# Patient Record
Sex: FEMALE | Race: WHITE | Employment: OTHER | URBAN - NONMETROPOLITAN AREA
[De-identification: names, ages, dates, MRNs, and addresses within clinical notes are randomized per-mention and may not be internally consistent; named-entity substitution may affect disease eponyms.]

---

## 2022-11-06 NOTE — PROGRESS NOTES
[facility]. Pulmonary medicine clinic initial consult note. Patient: Flip Hernández  : 1932      Chief complaint/[place]: Flip Hernández is a 80 y.o. old female came for further evaluation regarding her chronic cough with referral from Dr. Harman Solano MD.  She was brought to my clinic accompanied by her son Mr. Zuñiga Covert. She is having cough: Yes  Duration of cough: for More than 6months  More than 8weeks: Yes  History of post nasal drip: No  History suggestive of GERD I.e bad tast in the mouth in the morning or heart burn: Yes. She is currently on treatment with the Protonix 40 mg p.o. daily for her GERD. History suggestive of aspiration/silent aspiration: No  Her cough is associated with sputum production: Yes   The sputum color: Whit to light yellow. Hemoptysis: She gave a history of occasional minimal hemoptysis mixed with her sputum. Diurnal variation: None. Associated with fever: No  Chills & rigors:No  Associated night sweats: No.  Recent travel history:No.    Rrecent sick contacts: No.   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  She is to follow-up with Dr. Sky Brown MD ENT physician in [place]. She used to be on treatment with Dupixent injections. She received her last dose of Dupixent in 2022. History of exposure to occupational dust or chemicals:No.       She is having shortness of breath:No  Current functional capacity on level ground: Distance she can walk on level ground: 2 blocks on level ground with slow pace. She can climb steps: No  She gives a history of orthopnea:No  She gives a history of paroxysmal nocturnal dyspnea:No     She is currently using any oxygen supplementation at rest, exercise or during sleep/at night time: No    She was ever diagnosed with following pulmonary diseases:  She was diagnosed with bronchial asthma in the past by her family physician.   She is currently on treatment with Qvar ready inhaler 80 mcg/ACT 1 puff twice daily. She is also taking Singulair 10 mg p.o. nightly. She is not using any rescue inhaler. 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 Her chest x-ray used to be negative. She never underwent treatment for latent TB infection. 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 She ever diagnosed with COVID-19 infection in the past: She was diagnosed with the COVID-19 infection on 29 October 2022. She was hospitalized to [facility] THE [facility] and stayed for 2 nights. She was treated with steroids Decadron 6 mg IV daily and was discharged home with 6 mg p.o. daily for 4 days. She completed her steroids 2 days back on 8 November 2022. She was also treated with ceftriaxone 1 g IV piggyback daily antibiotic during her hospitalization for 3 days and was discharged home on doxycycline 100 mg p.o. twice daily for 4 days. She was also treated with baricitinib 4 mg daily. She was also given treatment with vitamin C 500 mg p.o. twice daily along with vitamin D 5000 units daily and a zinc sulfate 220 mg p.o. twice daily. She was treated with several courses of antibiotics by her family physician with minimal improvement in her cough symptoms. How ever her cough got worse recently. Social History:  Occupation:  She is current working: No  Type of profession: retired.   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       History of exposure to coal mines/coal dust: NO  History of exposure to foundry dust/welding: NO  History of exposure to quarry/silica/sandblasting: NO  History of exposure to asbestos/working with breaks/ships: NO  History of exposure to farm dust: NO  History of recent travel to long distances: NO  History of exposure to birds, pigeons, or chickens in the past:NO  Pet animals at home:No    History of pulmonary embolism in the past: No            History of DVT in the past:No                               Review of Systems:   General/Constitutional: No recent loss of weight or appetite changes. No fever or chills. HENT: Negative. Eyes: Negative. Upper respiratory tract: No nasal stuffiness or post nasal drip. Lower respiratory tract/ lungs: See HPI. Cardiovascular: No palpitations or chest pain. Gastrointestinal: No nausea or vomiting. Neurological: No focal neurologiacal weakness. Extremities: No edema. Musculoskeletal: No complaints. Genitourinary: No complaints. Hematological: Negative. Psychiatric/Behavioral: Negative. Skin: No itching. Current Medications:      Past Medical History:   Diagnosis Date    Asthma     Cognitive communication deficit     Left bundle branch block     Pneumonia        No past surgical history on file.     Allergies   Allergen Reactions    Accolate [Zafirlukast] Other (See Comments)     unknown    Asa [Aspirin] Other (See Comments)     unknown    Chlordiazepoxide Other (See Comments)     unknown    Ciprofloxacin Other (See Comments)     unknown    Clindamycin/Lincomycin Other (See Comments)     Unknown    Codeine      All codeine    Demerol [Meperidine Hcl] Other (See Comments)     unknown    Diazepam Other (See Comments)     unknown    Diclofenac Sodium Other (See Comments)     unknown Evista [Raloxifene] Other (See Comments)     unknown    Gabapentin Other (See Comments)     unknown    Iodine Other (See Comments)     unknown    Metoprolol Other (See Comments)     unknown    Pcn [Penicillins] Other (See Comments)     unknown    Phenergan [Promethazine] Other (See Comments)     unknown    Promethazine Hcl Other (See Comments)     unknown    Talwin [Pentazocine] Other (See Comments)     unknown    Triamcinolone Acetonide Other (See Comments)     unknown       Current Outpatient Medications   Medication Sig Dispense Refill    sotalol (BETAPACE) 80 MG tablet Take 80 mg by mouth 2 times daily      valACYclovir (VALTREX) 500 MG tablet Take 500 mg by mouth daily      vitamin B-1 (THIAMINE) 100 MG tablet Take 100 mg by mouth daily      rivaroxaban (XARELTO) 20 MG TABS tablet Take 20 mg by mouth      zinc gluconate 50 MG tablet Take 50 mg by mouth daily      vitamin C (ASCORBIC ACID) 500 MG tablet Take 500 mg by mouth daily      calcium carbonate 600 MG TABS tablet Take 1 tablet by mouth daily      Cholecalciferol (VITAMIN D3) 125 MCG (5000 UT) TABS Take by mouth      dextromethorphan (DELSYM) 30 MG/5ML extended release liquid Take 60 mg by mouth 2 times daily as needed for Cough      ferrous sulfate (IRON 325) 325 (65 Fe) MG tablet Take 325 mg by mouth in the morning and at bedtime      hydroCHLOROthiazide (MICROZIDE) 12.5 MG capsule Take 12.5 mg by mouth daily      lisinopril (PRINIVIL;ZESTRIL) 40 MG tablet Take 40 mg by mouth daily      meclizine (ANTIVERT) 25 MG tablet Take 25 mg by mouth 3 times daily as needed      montelukast (SINGULAIR) 10 MG tablet Take 10 mg by mouth nightly      nitroGLYCERIN (NITROSTAT) 0.4 MG SL tablet Place 0.4 mg under the tongue every 5 minutes as needed for Chest pain up to max of 3 total doses. If no relief after 1 dose, call 911.       polyethylene glycol (GLYCOLAX) 17 g packet Take 17 g by mouth daily as needed for Constipation      potassium chloride (KLOR-CON) 20 MEQ packet Take 20 mEq by mouth 2 times daily      pravastatin (PRAVACHOL) 40 MG tablet Take 40 mg by mouth daily      pantoprazole (PROTONIX) 40 MG tablet Take 40 mg by mouth daily      beclomethasone (QVAR REDIHALER) 80 MCG/ACT AERB inhaler Inhale 1 puff into the lungs 2 times daily      senna-docusate (PERICOLACE) 8.6-50 MG per tablet Take 1 tablet by mouth daily      Dextromethorphan HBr (DELSYM PO) Take by mouth      Multiple Vitamins-Minerals (PRESERVISION AREDS 2 PO) Take by mouth       No current facility-administered medications for this visit. No family history on file. Physical Exam:    VITALS:  /70 (Site: Left Upper Arm, Position: Sitting, Cuff Size: Medium Adult)   Pulse 64   Temp 98.6 °F (37 °C)   Ht 5' 3\" (1.6 m)   Wt 123 lb (55.8 kg)   SpO2 97% Comment: room air at rest  BMI 21.79 kg/m²   Nursing note and vitals reviewed. Constitutional: Patient appears moderately built and moderately nourished. No distress. Patient is oriented to person, place, and time. HENT:   Head: Normocephalic and atraumatic. Right Ear: External ear normal.   Left Ear: External ear normal.   Mouth/Throat: Oropharynx is clear and moist.  No oral thrush. Eyes: Conjunctivae are normal. Pupils are equal, round, and reactive to light. No scleral icterus. Neck: Neck supple. No JVD present. No tracheal deviation present. Cardiovascular: Normal rate, regular rhythm, normal heart sounds. No murmur heard. Pulmonary/Chest: Effort normal and breath sounds normal. No stridor. No respiratory distress. No wheezes. No rales. Patient exhibits no tenderness. Abdominal: Soft. Patient exhibits no distension. No tenderness. Musculoskeletal: Normal range of motion. Extremities: Patient exhibits no edema and no tenderness. Lymphadenopathy:  No cervical adenopathy. Neurological: Patient is alert and oriented to person, place, and time. Skin: Skin is warm and dry. Patient is not diaphoretic.    Psychiatric: Patient  has a normal mood and affect. Patient behavior is normal.       Diagnostic Data:    Radiological Data:  Chest x-ray single view performed on 28 October 2022:        CT scan of chest with IV contrast performed on 28 October 2022 performed at [facility] THE [facility]:            Pulmonary function tests:  None in epic    Echocardiogram:  None in epic      Assessment:  -Chronic cough due to-differential diagnoses includes-due to ACE inhibitor related VS right lower lobe cavitary pneumonia Vs ?chronic sinusitis, GERD, Asthma and allergic rhinitis. -Hemoptysis most likely due to her current anticoagulation with Xarelto VS other etiologies including PNEUMONIA. Cannot exclude pulmonary tuberculosis-less likely.  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  Patient received BCG vaccination in the past.  Patient never received latent TB prophylaxis.  -Chronic atrial fibrillation. She is on treatment with Xarelto and sotalol. She is currently following with a cardiologist Dr. Anil Balderrama MD in [facility].  -Moderate persistent bronchial asthma. She is on treatment with Qvar and  Singulair 10 mg p.o. daily.  -History of left bundle branch block.  -History of recurrent shingles. She is on treatment with Valtrex 500 mg p.o. daily. Recommendations/Plan:  -She will be admitted to [address] [place] [address] for further evaluation and management of ongoing hemoptysis and chronic cough with a right lower lobe cavitary pneumonia.  -She was advised to stop taking Lisinopril due to her chronic cough ( May be side effect from ACE inhibitor i.e Lisinopril) after discussing with her family physician.  Her family physician need to start on alternate medication with out cough as side effect for optimal control of blood pressure.  -She was provided with an N95 mask to use it until she was cleared.  -She will be placed on airborne precautions/respiratory isolation.    -We will send sputum for acid-fast bacilli for smear and cultures x3.  -Patient will need a diagnostic bronchoscopy to further evaluate her right lower lobe cavity pneumonia and hemoptysis along with abnormal CT scan of chest findings.  -Her Xarelto need to be held for planned bronchoscopy with bridging   -She will be arranged for follow-up at the time of discharge from hospital.  -I placed a call to [facility] admitting department. I am waiting for the response to arrange for direct admission to [facility] due to requirement of negative pressure room. -She had a son were educated about my impression and plan. They verbalizes understanding.      -I personally reviewed updated the Past medical hx, Past surgical hx,Social hx, Family hx, Medications, Allergies in the discrete data section of the patient chart along with labs, Pulmonary medicine,Sleep medicine related, Pathological, Microbiological and Radiological investigations. Addendum done on 11/10/22 at 10:41 AM EST:  I spoke with Ms. Yadira Daniel from [address] [place] [address] admitting department and arranged for direct admission to telemetry unit under hospitalist service. I requested a negative pressure isolation room due to suspicion for pulmonary tuberculosis as an etiology for right lower lobe cavity Pneumonia and hemoptysis with a history of PPD positivity. I spoke with Dr. Vanessa Gao MD from internal medicine admitting service at [facility] over phone. I gave him a detailed signout on the patient and the reason for hospitalization. Dr. Vanessa Gao MD told me that, at present there are no in patient beds available at [facility]. They will get in touch with the patient once bed is available. Patient was tentatively scheduled for diagnostic bronchoscopy under fluoroscopy to obtain a transbronchial biopsy under fluoroscopy, BAL and washings on a Tuesday next week 3 PM.  Patient and patient's son were informed.     Patient was provided with an N95 mask to use it until she was hospitalized.

## 2022-11-09 RX ORDER — MONTELUKAST SODIUM 10 MG/1
10 TABLET ORAL NIGHTLY
COMMUNITY

## 2022-11-09 RX ORDER — HYDROCHLOROTHIAZIDE 12.5 MG/1
12.5 CAPSULE, GELATIN COATED ORAL DAILY
Status: ON HOLD | COMMUNITY
End: 2022-11-19 | Stop reason: HOSPADM

## 2022-11-09 RX ORDER — FERROUS SULFATE 325(65) MG
325 TABLET ORAL 2 TIMES DAILY
COMMUNITY

## 2022-11-09 RX ORDER — PHENOL 1.4 %
1 AEROSOL, SPRAY (ML) MUCOUS MEMBRANE DAILY
COMMUNITY

## 2022-11-09 RX ORDER — MECLIZINE HYDROCHLORIDE 25 MG/1
25 TABLET ORAL 3 TIMES DAILY PRN
Status: ON HOLD | COMMUNITY
End: 2022-11-19 | Stop reason: HOSPADM

## 2022-11-09 RX ORDER — PANTOPRAZOLE SODIUM 40 MG/1
40 TABLET, DELAYED RELEASE ORAL DAILY
COMMUNITY

## 2022-11-09 RX ORDER — DEXTROMETHORPHAN POLISTIREX 30 MG/5ML
60 SUSPENSION ORAL 2 TIMES DAILY PRN
Status: ON HOLD | COMMUNITY
End: 2022-11-19 | Stop reason: HOSPADM

## 2022-11-09 RX ORDER — ASCORBIC ACID 500 MG
500 TABLET ORAL DAILY
Status: ON HOLD | COMMUNITY
End: 2022-11-19 | Stop reason: HOSPADM

## 2022-11-09 RX ORDER — POLYETHYLENE GLYCOL 3350 17 G/17G
17 POWDER, FOR SOLUTION ORAL DAILY PRN
COMMUNITY

## 2022-11-09 RX ORDER — LISINOPRIL 40 MG/1
40 TABLET ORAL DAILY
Status: ON HOLD | COMMUNITY
End: 2022-11-19 | Stop reason: HOSPADM

## 2022-11-09 RX ORDER — AMOXICILLIN 250 MG
1 CAPSULE ORAL DAILY
COMMUNITY

## 2022-11-09 RX ORDER — NITROGLYCERIN 0.4 MG/1
0.4 TABLET SUBLINGUAL EVERY 5 MIN PRN
COMMUNITY

## 2022-11-09 RX ORDER — POTASSIUM CHLORIDE 1.5 G/1.77G
20 POWDER, FOR SOLUTION ORAL 2 TIMES DAILY
Status: ON HOLD | COMMUNITY
End: 2022-11-19 | Stop reason: HOSPADM

## 2022-11-09 RX ORDER — PRAVASTATIN SODIUM 40 MG
40 TABLET ORAL DAILY
COMMUNITY

## 2022-11-10 ENCOUNTER — OFFICE VISIT (OUTPATIENT)
Dept: PULMONOLOGY | Age: 87
End: 2022-11-10
Payer: MEDICARE

## 2022-11-10 ENCOUNTER — HOSPITAL ENCOUNTER (INPATIENT)
Age: 87
LOS: 10 days | Discharge: NURSING, REHAB OR LONG-TERM CARE | DRG: 178 | End: 2022-11-20
Attending: INTERNAL MEDICINE | Admitting: STUDENT IN AN ORGANIZED HEALTH CARE EDUCATION/TRAINING PROGRAM
Payer: MEDICARE

## 2022-11-10 VITALS
DIASTOLIC BLOOD PRESSURE: 70 MMHG | TEMPERATURE: 98.6 F | SYSTOLIC BLOOD PRESSURE: 118 MMHG | HEIGHT: 63 IN | HEART RATE: 64 BPM | WEIGHT: 123 LBS | BODY MASS INDEX: 21.79 KG/M2 | OXYGEN SATURATION: 97 %

## 2022-11-10 DIAGNOSIS — J98.4 PNEUMONIA WITH CAVITY OF LUNG: ICD-10-CM

## 2022-11-10 DIAGNOSIS — R04.2 HEMOPTYSIS: ICD-10-CM

## 2022-11-10 DIAGNOSIS — R76.11 PPD POSITIVE: ICD-10-CM

## 2022-11-10 DIAGNOSIS — R93.89 ABNORMAL CT OF THE CHEST: ICD-10-CM

## 2022-11-10 DIAGNOSIS — R04.2 HEMOPTYSIS: Primary | ICD-10-CM

## 2022-11-10 DIAGNOSIS — J18.9 PNEUMONIA WITH CAVITY OF LUNG: ICD-10-CM

## 2022-11-10 DIAGNOSIS — J98.4 CAVITARY LESION OF LUNG: Primary | ICD-10-CM

## 2022-11-10 DIAGNOSIS — B44.9 ASPERGILLOSIS (HCC): ICD-10-CM

## 2022-11-10 PROBLEM — I48.0 PAROXYSMAL ATRIAL FIBRILLATION (HCC): Status: ACTIVE | Noted: 2022-11-10

## 2022-11-10 PROBLEM — I10 PRIMARY HYPERTENSION: Status: ACTIVE | Noted: 2022-11-10

## 2022-11-10 LAB
ALBUMIN SERPL-MCNC: 3.2 G/DL (ref 3.5–5.1)
ALP BLD-CCNC: 63 U/L (ref 38–126)
ALT SERPL-CCNC: 18 U/L (ref 11–66)
ANION GAP SERPL CALCULATED.3IONS-SCNC: 11 MEQ/L (ref 8–16)
APTT: 28.9 SECONDS (ref 22–38)
AST SERPL-CCNC: 19 U/L (ref 5–40)
BASOPHILS # BLD: 0.5 %
BASOPHILS ABSOLUTE: 0 THOU/MM3 (ref 0–0.1)
BILIRUB SERPL-MCNC: 0.4 MG/DL (ref 0.3–1.2)
BUN BLDV-MCNC: 19 MG/DL (ref 7–22)
CALCIUM SERPL-MCNC: 8.6 MG/DL (ref 8.5–10.5)
CHLORIDE BLD-SCNC: 103 MEQ/L (ref 98–111)
CO2: 24 MEQ/L (ref 23–33)
CREAT SERPL-MCNC: 0.9 MG/DL (ref 0.4–1.2)
EOSINOPHIL # BLD: 2.7 %
EOSINOPHILS ABSOLUTE: 0.2 THOU/MM3 (ref 0–0.4)
ERYTHROCYTE [DISTWIDTH] IN BLOOD BY AUTOMATED COUNT: 14.6 % (ref 11.5–14.5)
ERYTHROCYTE [DISTWIDTH] IN BLOOD BY AUTOMATED COUNT: 47.5 FL (ref 35–45)
GFR SERPL CREATININE-BSD FRML MDRD: > 60 ML/MIN/1.73M2
GLUCOSE BLD-MCNC: 91 MG/DL (ref 70–108)
HCT VFR BLD CALC: 34 % (ref 37–47)
HEMOGLOBIN: 10.7 GM/DL (ref 12–16)
IMMATURE GRANS (ABS): 0.04 THOU/MM3 (ref 0–0.07)
IMMATURE GRANULOCYTES: 0.5 %
INR BLD: 1.26 (ref 0.85–1.13)
LYMPHOCYTES # BLD: 23.6 %
LYMPHOCYTES ABSOLUTE: 2.1 THOU/MM3 (ref 1–4.8)
MCH RBC QN AUTO: 28.8 PG (ref 26–33)
MCHC RBC AUTO-ENTMCNC: 31.5 GM/DL (ref 32.2–35.5)
MCV RBC AUTO: 91.6 FL (ref 81–99)
MONOCYTES # BLD: 9.3 %
MONOCYTES ABSOLUTE: 0.8 THOU/MM3 (ref 0.4–1.3)
NUCLEATED RED BLOOD CELLS: 0 /100 WBC
PLATELET # BLD: 245 THOU/MM3 (ref 130–400)
PMV BLD AUTO: 9.2 FL (ref 9.4–12.4)
POTASSIUM SERPL-SCNC: 4.7 MEQ/L (ref 3.5–5.2)
RBC # BLD: 3.71 MILL/MM3 (ref 4.2–5.4)
SEG NEUTROPHILS: 63.4 %
SEGMENTED NEUTROPHILS ABSOLUTE COUNT: 5.6 THOU/MM3 (ref 1.8–7.7)
SODIUM BLD-SCNC: 138 MEQ/L (ref 135–145)
TOTAL PROTEIN: 5.5 G/DL (ref 6.1–8)
WBC # BLD: 8.8 THOU/MM3 (ref 4.8–10.8)

## 2022-11-10 PROCEDURE — 80053 COMPREHEN METABOLIC PANEL: CPT

## 2022-11-10 PROCEDURE — 1123F ACP DISCUSS/DSCN MKR DOCD: CPT | Performed by: INTERNAL MEDICINE

## 2022-11-10 PROCEDURE — 1200000000 HC SEMI PRIVATE

## 2022-11-10 PROCEDURE — 93005 ELECTROCARDIOGRAM TRACING: CPT

## 2022-11-10 PROCEDURE — 85025 COMPLETE CBC W/AUTO DIFF WBC: CPT

## 2022-11-10 PROCEDURE — 1200000003 HC TELEMETRY R&B

## 2022-11-10 PROCEDURE — 6370000000 HC RX 637 (ALT 250 FOR IP)

## 2022-11-10 PROCEDURE — 85730 THROMBOPLASTIN TIME PARTIAL: CPT

## 2022-11-10 PROCEDURE — 36415 COLL VENOUS BLD VENIPUNCTURE: CPT

## 2022-11-10 PROCEDURE — 85610 PROTHROMBIN TIME: CPT

## 2022-11-10 PROCEDURE — 99222 1ST HOSP IP/OBS MODERATE 55: CPT

## 2022-11-10 PROCEDURE — 99205 OFFICE O/P NEW HI 60 MIN: CPT | Performed by: INTERNAL MEDICINE

## 2022-11-10 RX ORDER — PRAVASTATIN SODIUM 40 MG
40 TABLET ORAL NIGHTLY
Status: DISCONTINUED | OUTPATIENT
Start: 2022-11-11 | End: 2022-11-20 | Stop reason: HOSPADM

## 2022-11-10 RX ORDER — HEPARIN SODIUM 10000 [USP'U]/100ML
5-30 INJECTION, SOLUTION INTRAVENOUS CONTINUOUS
Status: DISCONTINUED | OUTPATIENT
Start: 2022-11-10 | End: 2022-11-15

## 2022-11-10 RX ORDER — VALSARTAN 320 MG/1
320 TABLET ORAL DAILY
COMMUNITY

## 2022-11-10 RX ORDER — HEPARIN SODIUM 1000 [USP'U]/ML
30 INJECTION, SOLUTION INTRAVENOUS; SUBCUTANEOUS PRN
Status: DISCONTINUED | OUTPATIENT
Start: 2022-11-10 | End: 2022-11-15

## 2022-11-10 RX ORDER — VALSARTAN 320 MG/1
320 TABLET ORAL DAILY
Status: DISCONTINUED | OUTPATIENT
Start: 2022-11-11 | End: 2022-11-20 | Stop reason: HOSPADM

## 2022-11-10 RX ORDER — PANTOPRAZOLE SODIUM 40 MG/1
40 TABLET, DELAYED RELEASE ORAL DAILY
Status: DISCONTINUED | OUTPATIENT
Start: 2022-11-11 | End: 2022-11-20 | Stop reason: HOSPADM

## 2022-11-10 RX ORDER — POLYETHYLENE GLYCOL 3350 17 G/17G
17 POWDER, FOR SOLUTION ORAL DAILY PRN
Status: DISCONTINUED | OUTPATIENT
Start: 2022-11-10 | End: 2022-11-12

## 2022-11-10 RX ORDER — FERROUS SULFATE 325(65) MG
325 TABLET ORAL 2 TIMES DAILY
Status: DISCONTINUED | OUTPATIENT
Start: 2022-11-11 | End: 2022-11-20 | Stop reason: HOSPADM

## 2022-11-10 RX ORDER — SODIUM CHLORIDE 0.9 % (FLUSH) 0.9 %
5-40 SYRINGE (ML) INJECTION EVERY 12 HOURS SCHEDULED
Status: DISCONTINUED | OUTPATIENT
Start: 2022-11-10 | End: 2022-11-20 | Stop reason: HOSPADM

## 2022-11-10 RX ORDER — HEPARIN SODIUM 1000 [USP'U]/ML
60 INJECTION, SOLUTION INTRAVENOUS; SUBCUTANEOUS ONCE
Status: COMPLETED | OUTPATIENT
Start: 2022-11-10 | End: 2022-11-11

## 2022-11-10 RX ORDER — THIAMINE MONONITRATE (VIT B1) 100 MG
100 TABLET ORAL DAILY
Status: ON HOLD | COMMUNITY
End: 2022-11-19 | Stop reason: HOSPADM

## 2022-11-10 RX ORDER — MONTELUKAST SODIUM 10 MG/1
10 TABLET ORAL NIGHTLY
Status: DISCONTINUED | OUTPATIENT
Start: 2022-11-11 | End: 2022-11-20 | Stop reason: HOSPADM

## 2022-11-10 RX ORDER — SODIUM CHLORIDE 9 MG/ML
INJECTION, SOLUTION INTRAVENOUS PRN
Status: DISCONTINUED | OUTPATIENT
Start: 2022-11-10 | End: 2022-11-20 | Stop reason: HOSPADM

## 2022-11-10 RX ORDER — ACYCLOVIR 400 MG/1
400 TABLET ORAL 3 TIMES DAILY
Status: DISCONTINUED | OUTPATIENT
Start: 2022-11-10 | End: 2022-11-20 | Stop reason: HOSPADM

## 2022-11-10 RX ORDER — SOTALOL HYDROCHLORIDE 80 MG/1
80 TABLET ORAL 2 TIMES DAILY
COMMUNITY

## 2022-11-10 RX ORDER — HEPARIN SODIUM 1000 [USP'U]/ML
60 INJECTION, SOLUTION INTRAVENOUS; SUBCUTANEOUS PRN
Status: DISCONTINUED | OUTPATIENT
Start: 2022-11-10 | End: 2022-11-15

## 2022-11-10 RX ORDER — VALACYCLOVIR HYDROCHLORIDE 500 MG/1
500 TABLET, FILM COATED ORAL DAILY
COMMUNITY

## 2022-11-10 RX ORDER — SOTALOL HYDROCHLORIDE 80 MG/1
80 TABLET ORAL 2 TIMES DAILY
Status: DISCONTINUED | OUTPATIENT
Start: 2022-11-10 | End: 2022-11-20 | Stop reason: HOSPADM

## 2022-11-10 RX ORDER — SODIUM CHLORIDE 0.9 % (FLUSH) 0.9 %
5-40 SYRINGE (ML) INJECTION PRN
Status: DISCONTINUED | OUTPATIENT
Start: 2022-11-10 | End: 2022-11-20 | Stop reason: HOSPADM

## 2022-11-10 RX ORDER — FLUTICASONE PROPIONATE 110 UG/1
1 AEROSOL, METERED RESPIRATORY (INHALATION) 2 TIMES DAILY
Status: DISCONTINUED | OUTPATIENT
Start: 2022-11-10 | End: 2022-11-20 | Stop reason: HOSPADM

## 2022-11-10 RX ORDER — CALCIUM CARBONATE 500(1250)
500 TABLET ORAL DAILY
Status: DISCONTINUED | OUTPATIENT
Start: 2022-11-11 | End: 2022-11-20 | Stop reason: HOSPADM

## 2022-11-10 RX ORDER — ZINC GLUCONATE 50 MG
50 TABLET ORAL DAILY
Status: ON HOLD | COMMUNITY
End: 2022-11-19 | Stop reason: HOSPADM

## 2022-11-10 RX ADMIN — SOTALOL HYDROCHLORIDE 80 MG: 80 TABLET ORAL at 23:54

## 2022-11-10 RX ADMIN — ACYCLOVIR 400 MG: 400 TABLET ORAL at 23:55

## 2022-11-10 NOTE — PATIENT INSTRUCTIONS
Recommendations/Plan:  -She will be admitted to [address] [place] [address] for further evaluation and management of ongoing hemoptysis and chronic cough with a right lower lobe cavitary pneumonia.  -She was advised to stop taking Lisinopril due to her chronic cough ( May be side effect from ACE inhibitor i.e Lisinopril) after discussing with her family physician. Her family physician need to start on alternate medication with out cough as side effect for optimal control of blood pressure.  -She was provided with an N95 mask to use it until she was cleared.  -She will be placed on airborne precautions/respiratory isolation.    -We will send sputum for acid-fast bacilli for smear and cultures x3.  -Patient will need a diagnostic bronchoscopy to further evaluate her right lower lobe cavity pneumonia and hemoptysis along with abnormal CT scan of chest findings.  -Her Xarelto need to be held for planned bronchoscopy with bridging   -She will be arranged for follow-up at the time of discharge from hospital.  -I placed a call to [facility] admitting department. I am waiting for the response to arrange for direct admission to [facility] due to requirement of negative pressure room. -She had a son were educated about my impression and plan. They verbalizes understanding.

## 2022-11-11 PROBLEM — R93.89 ABNORMAL CT OF THE CHEST: Status: ACTIVE | Noted: 2022-11-11

## 2022-11-11 PROBLEM — I48.20 CHRONIC A-FIB (HCC): Status: ACTIVE | Noted: 2022-11-11

## 2022-11-11 LAB
ANION GAP SERPL CALCULATED.3IONS-SCNC: 11 MEQ/L (ref 8–16)
APTT: 51.3 SECONDS (ref 22–38)
APTT: 51.6 SECONDS (ref 22–38)
APTT: 56.8 SECONDS (ref 22–38)
BASOPHILS # BLD: 0.2 %
BASOPHILS ABSOLUTE: 0 THOU/MM3 (ref 0–0.1)
BUN BLDV-MCNC: 18 MG/DL (ref 7–22)
CALCIUM SERPL-MCNC: 8.5 MG/DL (ref 8.5–10.5)
CHLORIDE BLD-SCNC: 104 MEQ/L (ref 98–111)
CO2: 25 MEQ/L (ref 23–33)
CREAT SERPL-MCNC: 0.7 MG/DL (ref 0.4–1.2)
EKG ATRIAL RATE: 75 BPM
EKG P AXIS: 69 DEGREES
EKG P-R INTERVAL: 164 MS
EKG Q-T INTERVAL: 430 MS
EKG QRS DURATION: 138 MS
EKG QTC CALCULATION (BAZETT): 480 MS
EKG R AXIS: -33 DEGREES
EKG T AXIS: 74 DEGREES
EKG VENTRICULAR RATE: 75 BPM
EOSINOPHIL # BLD: 2.8 %
EOSINOPHILS ABSOLUTE: 0.2 THOU/MM3 (ref 0–0.4)
ERYTHROCYTE [DISTWIDTH] IN BLOOD BY AUTOMATED COUNT: 14.5 % (ref 11.5–14.5)
ERYTHROCYTE [DISTWIDTH] IN BLOOD BY AUTOMATED COUNT: 45.9 FL (ref 35–45)
GFR SERPL CREATININE-BSD FRML MDRD: > 60 ML/MIN/1.73M2
GLUCOSE BLD-MCNC: 91 MG/DL (ref 70–108)
HCT VFR BLD CALC: 29.2 % (ref 37–47)
HEMOGLOBIN: 9.5 GM/DL (ref 12–16)
HEPARIN UNFRACTIONATED: 0.58 U/ML (ref 0.3–0.7)
IMMATURE GRANS (ABS): 0.03 THOU/MM3 (ref 0–0.07)
IMMATURE GRANULOCYTES: 0.4 %
INR BLD: 1.26 (ref 0.85–1.13)
LYMPHOCYTES # BLD: 28.4 %
LYMPHOCYTES ABSOLUTE: 2.4 THOU/MM3 (ref 1–4.8)
MCH RBC QN AUTO: 29.1 PG (ref 26–33)
MCHC RBC AUTO-ENTMCNC: 32.5 GM/DL (ref 32.2–35.5)
MCV RBC AUTO: 89.3 FL (ref 81–99)
MONOCYTES # BLD: 9.4 %
MONOCYTES ABSOLUTE: 0.8 THOU/MM3 (ref 0.4–1.3)
NUCLEATED RED BLOOD CELLS: 0 /100 WBC
PLATELET # BLD: 250 THOU/MM3 (ref 130–400)
PMV BLD AUTO: 9.2 FL (ref 9.4–12.4)
POTASSIUM REFLEX MAGNESIUM: 4.3 MEQ/L (ref 3.5–5.2)
RBC # BLD: 3.27 MILL/MM3 (ref 4.2–5.4)
REASON FOR REJECTION: NORMAL
REASON FOR REJECTION: NORMAL
REJECTED TEST: NORMAL
REJECTED TEST: NORMAL
SEG NEUTROPHILS: 58.8 %
SEGMENTED NEUTROPHILS ABSOLUTE COUNT: 4.9 THOU/MM3 (ref 1.8–7.7)
SODIUM BLD-SCNC: 140 MEQ/L (ref 135–145)
WBC # BLD: 8.3 THOU/MM3 (ref 4.8–10.8)

## 2022-11-11 PROCEDURE — 36415 COLL VENOUS BLD VENIPUNCTURE: CPT

## 2022-11-11 PROCEDURE — 85730 THROMBOPLASTIN TIME PARTIAL: CPT

## 2022-11-11 PROCEDURE — 6370000000 HC RX 637 (ALT 250 FOR IP)

## 2022-11-11 PROCEDURE — 1200000003 HC TELEMETRY R&B

## 2022-11-11 PROCEDURE — 94640 AIRWAY INHALATION TREATMENT: CPT

## 2022-11-11 PROCEDURE — 85025 COMPLETE CBC W/AUTO DIFF WBC: CPT

## 2022-11-11 PROCEDURE — 99223 1ST HOSP IP/OBS HIGH 75: CPT | Performed by: INTERNAL MEDICINE

## 2022-11-11 PROCEDURE — 6360000002 HC RX W HCPCS

## 2022-11-11 PROCEDURE — 2580000003 HC RX 258

## 2022-11-11 PROCEDURE — 85610 PROTHROMBIN TIME: CPT

## 2022-11-11 PROCEDURE — 93010 ELECTROCARDIOGRAM REPORT: CPT | Performed by: NUCLEAR MEDICINE

## 2022-11-11 PROCEDURE — 99232 SBSQ HOSP IP/OBS MODERATE 35: CPT | Performed by: NURSE PRACTITIONER

## 2022-11-11 PROCEDURE — 87205 SMEAR GRAM STAIN: CPT

## 2022-11-11 PROCEDURE — 85520 HEPARIN ASSAY: CPT

## 2022-11-11 PROCEDURE — 1200000000 HC SEMI PRIVATE

## 2022-11-11 PROCEDURE — 80048 BASIC METABOLIC PNL TOTAL CA: CPT

## 2022-11-11 RX ADMIN — ACYCLOVIR 400 MG: 400 TABLET ORAL at 21:14

## 2022-11-11 RX ADMIN — ACYCLOVIR 400 MG: 400 TABLET ORAL at 14:42

## 2022-11-11 RX ADMIN — HEPARIN SODIUM 12 UNITS/KG/HR: 10000 INJECTION, SOLUTION INTRAVENOUS at 00:07

## 2022-11-11 RX ADMIN — SOTALOL HYDROCHLORIDE 80 MG: 80 TABLET ORAL at 08:17

## 2022-11-11 RX ADMIN — VALSARTAN 320 MG: 320 TABLET, FILM COATED ORAL at 08:17

## 2022-11-11 RX ADMIN — POLYETHYLENE GLYCOL 3350 17 G: 17 POWDER, FOR SOLUTION ORAL at 12:53

## 2022-11-11 RX ADMIN — SODIUM CHLORIDE, PRESERVATIVE FREE 10 ML: 5 INJECTION INTRAVENOUS at 21:14

## 2022-11-11 RX ADMIN — ACYCLOVIR 400 MG: 400 TABLET ORAL at 08:18

## 2022-11-11 RX ADMIN — PRAVASTATIN SODIUM 40 MG: 40 TABLET ORAL at 21:14

## 2022-11-11 RX ADMIN — PANTOPRAZOLE SODIUM 40 MG: 40 TABLET, DELAYED RELEASE ORAL at 06:16

## 2022-11-11 RX ADMIN — FLUTICASONE PROPIONATE 1 PUFF: 110 AEROSOL, METERED RESPIRATORY (INHALATION) at 09:29

## 2022-11-11 RX ADMIN — HEPARIN SODIUM 3350 UNITS: 1000 INJECTION, SOLUTION INTRAVENOUS; SUBCUTANEOUS at 00:07

## 2022-11-11 RX ADMIN — SOTALOL HYDROCHLORIDE 80 MG: 80 TABLET ORAL at 21:14

## 2022-11-11 RX ADMIN — SODIUM CHLORIDE, PRESERVATIVE FREE 10 ML: 5 INJECTION INTRAVENOUS at 00:09

## 2022-11-11 RX ADMIN — FERROUS SULFATE TAB 325 MG (65 MG ELEMENTAL FE) 325 MG: 325 (65 FE) TAB at 08:17

## 2022-11-11 RX ADMIN — CALCIUM 500 MG: 500 TABLET ORAL at 08:18

## 2022-11-11 RX ADMIN — FLUTICASONE PROPIONATE 1 PUFF: 110 AEROSOL, METERED RESPIRATORY (INHALATION) at 19:56

## 2022-11-11 RX ADMIN — MONTELUKAST SODIUM 10 MG: 10 TABLET ORAL at 21:14

## 2022-11-11 ASSESSMENT — ENCOUNTER SYMPTOMS
WHEEZING: 0
ABDOMINAL DISTENTION: 0
SHORTNESS OF BREATH: 0
CONSTIPATION: 0
DIARRHEA: 0
COUGH: 1

## 2022-11-11 NOTE — PLAN OF CARE
Problem: Respiratory - Adult  Goal: Clear lung sounds  Outcome: Progressing   Continue inhaler, pt uses Qvar at home. Patient mutually agreed on goals.

## 2022-11-11 NOTE — CONSULTS
[facility]      Patient Barbara Montgomery   MRN -  [id number]   [facility] # - [de-identified]   - 1932      Date of Admission -  11/10/2022  8:38 PM  Date of evaluation -  2022  Room - 5-011-A   Hospital Day - [address] [facility], * Primary Care Physician - Marbin Sanchez MD     Problem List      Active Hospital Problems    Diagnosis Date Noted    Cough with hemoptysis [R04.2] 11/10/2022     Priority: Medium    Paroxysmal atrial fibrillation ([address] [I48.0] 11/10/2022     Priority: Medium    Cavitary lesion of lung [J98.4] 11/10/2022     Priority: Medium    Primary hypertension [I10] 11/10/2022     Priority: Medium     Reason for Consult    Chronic cough   HPI   History Obtained From: Patient  and electronic medical record. Vijaya Martin is a 80 y.o. female with PMHx of GERD, recurrent shingles, HLD, HTN, paroxysmal A. fib, Hx of LBBB, asthma who presents to [facility] on 11/10 for symptoms of chronic cough and hemoptysis. Patient has had this cough for minimum of 18 months. She was recently diagnosed with COVID on 10/29/2022 and was hospitalized at [facility], treated with Decadron 6 mg, antibiotic course, and baricitinib. Patient also reports of intermittent hemoptysis which is mild and infrequent. Was seen in the office of  on 11/10 and was transferred to inpatient services. Patient has been treated with several courses of antibiotic by PCP for cough with minimal improvement. 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 Previous chest x-rays were negative. CXR done at [facility] on 10/28 shows slight improvement in the right perihilar infiltrate residual infiltrates persist.  CTA chest on 10/28 showed patchy right middle lobe and right lower lobe airspace opacities some of which are nodular in morphology.   There are trace pleural effusions. There is a cavitary 2.0 x 2.8 cm noncalcified right lower lobe pulmonary nodule as well as additional noncavitary noncalcified nodular densities in the right lower lobe that are associated with interstitial prominence and bronchial wall thickening. Findings are likely infectious in etiology. Sequela of old granulomatous disease. Subcentimeter short axis dimension mediastinal and right hilar lymph nodes. Irritability. No.  Up in the office can any other operative rectal  She is having shortness of breath:No  Current functional capacity on level ground: Distance she can walk on level ground: 2 blocks on level ground with slow pace. She can climb steps: No  She gives a history of orthopnea:No  She gives a history of paroxysmal nocturnal dyspnea:No       She is having cough: Yes  Duration of cough: for More than 6months  More than 8weeks: Yes  History of post nasal drip: No  History suggestive of GERD I.e bad tast in the mouth in the morning or heart burn: Yes. She is currently on treatment with the Protonix 40 mg p.o. daily for her GERD. History suggestive of aspiration/silent aspiration: No  Her cough is associated with sputum production: Yes   The sputum color: Whit to light yellow. Hemoptysis: She gave a history of occasional minimal hemoptysis mixed with her sputum. Diurnal variation: None. Associated with fever: No  Chills & rigors:No  Associated night sweats: No.  Recent travel history:No.    Rrecent sick contacts: No.   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  She is to follow-up with Dr. Verena Grnade MD ENT physician in [place]. She used to be on treatment with Dupixent injections. She received her last dose of Dupixent in March 2022. History of exposure to occupational dust or chemicals:No.     She was ever diagnosed with following pulmonary diseases:  She was diagnosed with bronchial asthma in the past by her family physician.   She is currently on treatment with Qvar ready inhaler 80 mcg/ACT 1 puff twice daily. She is also taking Singulair 10 mg p.o. nightly. She is not using any rescue inhaler. 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 Her chest x-ray used to be negative. She never underwent treatment for latent TB infection. 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 She ever diagnosed with COVID-19 infection in the past: She was diagnosed with the COVID-19 infection on 29 October 2022. She was hospitalized to [facility] and stayed for 2 nights. She was treated with steroids Decadron 6 mg IV daily and was discharged home with 6 mg p.o. daily for 4 days. She completed her steroids 2 days back on 8 November 2022. She was also treated with ceftriaxone 1 g IV piggyback daily antibiotic during her hospitalization for 3 days and was discharged home on doxycycline 100 mg p.o. twice daily for 4 days. She was also treated with baricitinib 4 mg daily. She was also given treatment with vitamin C 500 mg p.o. twice daily along with vitamin D 5000 units daily and a zinc sulfate 220 mg p.o. twice daily. She is having chest pain:No      PMHx   Past Medical History      Diagnosis Date    Asthma     Cognitive communication deficit     Left bundle branch block     Pneumonia       Past Surgical History    History reviewed. No pertinent surgical history.   Meds    Current Medications    calcium elemental  500 mg Oral Daily    ferrous sulfate  325 mg Oral BID    montelukast  10 mg Oral Nightly    pantoprazole  40 mg Oral Daily    pravastatin  40 mg Oral Nightly    sotalol  80 mg Oral BID    acyclovir  400 mg Oral TID    valsartan  320 mg Oral Daily    sodium chloride flush  5-40 mL IntraVENous 2 times per day    fluticasone  1 puff Inhalation BID     sodium chloride flush, sodium chloride, polyethylene glycol, heparin (porcine), heparin (porcine)  IV Drips/Infusions   sodium chloride      heparin (PORCINE) Infusion 12 Units/kg/hr (11/11/22 0007)     Home Medications  Medications Prior to Admission: valsartan (DIOVAN) 320 MG tablet, Take 320 mg by mouth daily  sotalol (BETAPACE) 80 MG tablet, Take 80 mg by mouth 2 times daily  valACYclovir (VALTREX) 500 MG tablet, Take 500 mg by mouth daily  vitamin B-1 (THIAMINE) 100 MG tablet, Take 100 mg by mouth daily (Patient not taking: Reported on 11/10/2022)  rivaroxaban (XARELTO) 20 MG TABS tablet, Take 20 mg by mouth  zinc gluconate 50 MG tablet, Take 50 mg by mouth daily (Patient not taking: Reported on 11/10/2022)  vitamin C (ASCORBIC ACID) 500 MG tablet, Take 500 mg by mouth daily (Patient not taking: Reported on 11/10/2022)  calcium carbonate 600 MG TABS tablet, Take 1 tablet by mouth daily  Cholecalciferol (VITAMIN D3) 125 MCG (5000 UT) TABS, Take by mouth  Dextromethorphan HBr (DELSYM PO), Take by mouth (Patient not taking: Reported on 11/10/2022)  dextromethorphan (DELSYM) 30 MG/5ML extended release liquid, Take 60 mg by mouth 2 times daily as needed for Cough (Patient not taking: Reported on 11/10/2022)  ferrous sulfate (IRON 325) 325 (65 Fe) MG tablet, Take 325 mg by mouth in the morning and at bedtime  hydroCHLOROthiazide (MICROZIDE) 12.5 MG capsule, Take 12.5 mg by mouth daily (Patient not taking: Reported on 11/10/2022)  lisinopril (PRINIVIL;ZESTRIL) 40 MG tablet, Take 40 mg by mouth daily (Patient not taking: Reported on 11/10/2022)  meclizine (ANTIVERT) 25 MG tablet, Take 25 mg by mouth 3 times daily as needed (Patient not taking: Reported on 11/10/2022)  montelukast (SINGULAIR) 10 MG tablet, Take 10 mg by mouth nightly  nitroGLYCERIN (NITROSTAT) 0.4 MG SL tablet, Place 0.4 mg under the tongue every 5 minutes as needed for Chest pain up to max of 3 total doses. If no relief after 1 dose, call 911. polyethylene glycol (GLYCOLAX) 17 g packet, Take 17 g by mouth daily as needed for Constipation  potassium chloride (KLOR-CON) 20 MEQ packet, Take 20 mEq by mouth 2 times daily (Patient not taking: Reported on 11/10/2022)  pravastatin (PRAVACHOL) 40 MG tablet, Take 40 mg by mouth daily  Multiple Vitamins-Minerals (PRESERVISION AREDS 2 PO), Take by mouth  pantoprazole (PROTONIX) 40 MG tablet, Take 40 mg by mouth daily  beclomethasone (QVAR REDIHALER) 80 MCG/ACT AERB inhaler, Inhale 1 puff into the lungs 2 times daily  senna-docusate (PERICOLACE) 8.6-50 MG per tablet, Take 1 tablet by mouth daily (Patient not taking: Reported on 11/10/2022)  Diet    ADULT DIET; Regular  Allergies    Accolate [zafirlukast], Asa [aspirin], Chlordiazepoxide, Ciprofloxacin, Clindamycin/lincomycin, Codeine, Demerol [meperidine hcl], Diazepam, Diclofenac sodium, Ethylenediamine, Evista [raloxifene], Gabapentin, Iodine, Metoprolol, Nitrofurantoin, Oxycodone-acetaminophen, Pcn [penicillins], Phenergan [promethazine], Promethazine hcl, Talwin [pentazocine], Triamcinolone acetonide, and Voltaren [diclofenac]  Social History     Social History     Socioeconomic History    Marital status:       Spouse name: Not on file    Number of children: Not on file    Years of education: Not on file    Highest education level: Not on file   Occupational History    Not on file   Tobacco Use    Smoking status: Never    Smokeless tobacco: Never   Substance and Sexual Activity    Alcohol use: Never    Drug use: Never    Sexual activity: Not on file   Other Topics Concern    Not on file   Social History Narrative Not on file     Social Determinants of Health     Financial Resource Strain: Not on file   Food Insecurity: Not on file   Transportation Needs: Not on file   Physical Activity: Not on file   Stress: Not on file   Social Connections: Not on file   Intimate Partner Violence: Not on file   Housing Stability: Not on file     Family History    History reviewed. No pertinent family history. Sleep History    Never diagnosed with sleep apnea in the past.  Occupational history   Occupation:  She is current working: No  Type of profession: retired. 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       History of exposure to coal mines/coal dust: NO  History of exposure to [employer] dust/welding: NO  History of exposure to quarry/silica/sandblasting: NO  History of exposure to asbestos/working with breaks/ships: NO  History of exposure to farm dust: NO  History of recent travel to long distances: NO  History of exposure to birds, pigeons, or chickens in the past:NO  Pet animals at home:No     History of pulmonary embolism in the past: No            History of DVT in the past:No                                 Review of systems   Review of Systems   Constitutional:  Negative for chills, fatigue, fever and unexpected weight change. Respiratory:  Positive for cough. Negative for shortness of breath and wheezing. Cardiovascular:  Negative for chest pain, palpitations and leg swelling. Gastrointestinal:  Negative for abdominal distention, constipation and diarrhea. Endocrine: Negative. Genitourinary: Negative. Musculoskeletal:  Negative for myalgias. Skin: Negative. Neurological:  Negative for dizziness, tremors, weakness and headaches. Vitals     height is 5' 3\" (1.6 m) and weight is 124 lb (56.2 kg). Her oral temperature is 98 °F (36.7 °C).  Her blood pressure is 148/69 (abnormal) and her pulse is 66. Her respiration is 16 and oxygen saturation is 94%. Body mass index is 21.97 kg/m². SUPPLEMENTAL O2:   RA    I/O    No intake or output data in the 24 hours ending 11/11/22 0838  No intake/output data recorded. Patient Vitals for the past 96 hrs (Last 3 readings):   Weight   11/10/22 2350 124 lb (56.2 kg)       Exam   Nursing note and vitals reviewed. Physical Exam  Constitutional:       General: She is not in acute distress. Appearance: Normal appearance. She is normal weight. HENT:      Head: Normocephalic and atraumatic. Right Ear: External ear normal.      Left Ear: External ear normal.      Nose: Nose normal.      Mouth/Throat:      Mouth: Mucous membranes are dry. Pharynx: Oropharynx is clear. Eyes:      Pupils: Pupils are equal, round, and reactive to light. Cardiovascular:      Rate and Rhythm: Normal rate and regular rhythm. Pulses: Normal pulses. Heart sounds: Normal heart sounds. Pulmonary:      Effort: Pulmonary effort is normal. No respiratory distress. Breath sounds: Rales present. Abdominal:      General: Abdomen is flat. Bowel sounds are normal.      Palpations: Abdomen is soft. Musculoskeletal:         General: Normal range of motion. Cervical back: Normal range of motion. Skin:     General: Skin is warm and dry. Capillary Refill: Capillary refill takes less than 2 seconds. Neurological:      Mental Status: She is alert.         Labs  - Old records and notes have been reviewed in [facility]   ABG  No results found for: PH, PO2, PCO2, HCO3, O2SAT  No results found for: GRISELDA Harvey  CBC  Recent Labs     11/10/22  2151 11/11/22  0407   WBC 8.8 8.3   RBC 3.71* 3.27*   HGB 10.7* 9.5*   HCT 34.0* 29.2*   MCV 91.6 89.3   MCH 28.8 29.1   MCHC 31.5* 32.5    250   MPV 9.2* 9.2*      BMP  Recent Labs     11/10/22  2151 11/11/22  0407    140   K 4.7 4.3    104   CO2 24 25   BUN 19 18   CREATININE 0.9 0.7   GLUCOSE 91 91   CALCIUM 8.6 8.5     LFT  Recent Labs     11/10/22  2151   AST 19   ALT 18   BILITOT 0.4   ALKPHOS 63     TROP  No results found for: TROPONINT  BNP  No results for input(s): BNP in the last 72 hours. Lactic Acid  No results for input(s): LACTA in the last 72 hours. INR  Recent Labs     11/10/22  2151 11/11/22  0408   INR 1.26* 1.26*     PTT  Recent Labs     11/10/22  2151 11/11/22  0408   APTT 28.9 56.8*     Glucose  No results for input(s): POCGLU in the last 72 hours. UA No results for input(s): SPECGRAV, PHUR, COLORU, CLARITYU, MUCUS, PROTEINU, BLOODU, RBCUA, WBCUA, BACTERIA, NITRU, GLUCOSEU, BILIRUBINUR, UROBILINOGEN, KETUA, LABCAST, LABCASTTY, AMORPHOS in the last 72 hours. Invalid input(s): CRYSTALS. PFTs   N/a in [facility]    Sleep studies   N/A in [facility]. Cultures    Acid fast bacillius collect  Respiratory Culture Collect  PNA Panel Collect     EKG     Echocardiogram   N/a in epic     Radiology    CXR      CT Scans  (See actual reports for details)         Assessment     Chronic cough 2/2 multifactorial ACEi vs. RLL/RML cavitary PNA vs. Chronic sinusitis vs. GERD,Asthma and allergic rhinitis:  CTA chest on 10/28: patchy right middle lobe and right lower lobe airspace opacities some of which are nodular in morphology. There is cavitary 2.0 x 2.8 cm noncalcified right lower lobe pulmonary nodule as well as additional noncavitary noncalcified nodular densities at the right lower lobe they are associated with interstitial prominence and bronchial wall thickening. BCG vaccine and hx of testing positive on PPD test. Never treated for TB. Hemoptysis 2/2 multifactorial AC+PNA: Currently on AOC w. Xarelto vs. other etiologies including PNA. Cannot exclude pulmonary TB but less likely. Moderate persistent bronchial Asthma: Home med of Qvar and Singulair 10mg po. Recently Dx w. COVID PNA: Treated w.  Paxlovid, ABX course, and baricitinib    Hx of recurrent shingles: Home med of Valtrex 500mg po. History of asthma: Home inhalers and Singulair resumed. Paroxysmal Afib s/p ablation 2016: [id number] score 4. Patient has remained in sinus rhythm since ablation per patient history. Home medication of  Xarelto +Sotalol. Follows Evelia Goode in [place], [place]. Holding Xarelto for bronchoscopy on Tuesday bridged with heparin. Hx of LBBB:noted in chart    Plan     SLP evaluation ordered + MBS for possible Aspiration PNA but less likely. Diagnostic bronchoscopy under fluoroscopy to obtain transbroncial biopsy under fluoroscopy, BAL and washings: Tuesday 3pm. Hold Xarelto until procedure. Continue Heparin Drip. Awaiting results from sputum culture for acid-fast bacili. Continue home inhalers. Stop Lisinopril per recommendation from family PCP. Continue wearing N95 mask. Remain on airborne precautions/respiratory isolation. \"Thank you for asking us to see this patient\"    Discussed with Dr. Johnna Bo. Questions and concerns addressed. Electronically signed by   Doug Garcia MD on 11/11/2022 at 8:38 AM     Addendum by Dr. Johnna Bo MD:  Patient seen by me independently including key components of medical care. Face to face evaluation and examination was performed. Case discussed with Dr. Doug Garcia MD-resident physician. Italicized font, if present,  represents changes to the note made by me. More than 50% of the encounter time involved with patient care by the Pulmonary & Critical care service team spent by me. Please see my modifications mentioned below:  She is on room air  Not in distress  All cultures were negative. Michell Perez educated about my impression and plan. She verbalizes understanding.       Electronically signed by   Ferdinand Councilman, MD on 11/11/2022 at 5:21 PM

## 2022-11-11 NOTE — PROGRESS NOTES
Hospitalist Progress Note    Patient:  Stewart Fleischer      Unit/Bed:5K-11/011-A    YOB: 1932    MRN: [id number]       Acct: [de-identified]     PCP: Ayush Ulrich MD    Date of Admission: 11/10/2022    Assessment/Plan:    Chronic cough with intermittent hemoptysis:  Concern for TB vs PNA. 10/28 CT chest shows white patchy right middle lobe and right lower lobe airspace opacities some of which are nodular in morphology. There is a cavitary 2.0 x 2.8 cm noncalcified right lower lobe pulmonary nodule as well as additional noncavitary noncalcified nodular densities at the right lower lobe they are associated with interstitial prominence and bronchial wall thickening. Likely infectious in etiology. 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 Never treated for TB. History of recent COVID PNA infection. AFB with culture x3 ordered, pending. Pulm following. Plan for diagnostic bronchoscopy. Airborne precautions in negative pressure room. SLP to eval and treat for possible aspiration pneumonia. Paroxysmal Atrial Fibrillation, rate controlled:  S/P Ablation 2016. [id number] Score 4, HAS-BLED Score 2. On Xarelto at home, on hold due to plan for bronchoscopy Tuesday. Bridging with Heparin drip. Unfractionated heparin every 6 hours. Monitor on telemetry. Primary HTN:  BP stable. Continue satalol and valsartan with hold parameters. Hyperlipidemia:  Continue pravastatin. Recurrent shingles:  Continue acyclovir. GERD:  Continue pantoprazole. Asthma:  Continue montelukast, fluticasone. LBBB:  Monitor on telemetry. Chief Complaint: hemoptysis, chronic cough    Hospital Course: The patient is a 80 y.o. female who presents with complaints of chronic cough and hemoptysis. Patient reports that she has had a chronic cough ongoing for greater than than 18 months.   She states due to her primary doctor is retiring as well as COVID she has been unable to have good follow-up on this problem. She also reports intermittent hemoptysis although it is mild and not frequent. Patient had referral to pulmonology today and was seen in the office of Dr. Rand Barillas. Patient diagnosed with COVID 19 at the end of October and had a CT chest done at that time which showed a lesion in the right lower lobe. Patient does report that this lesion has been seen on previous CT scans although those records are not available in the chart currently. 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 She has never underwent treatment for latent TB infection and chest x-ray previously were negative. Dr. Liyah Fajardo for diagnostic bronchoscopy to further evaluate right lower lobe cavity pneumonia and hemoptysis. Concerned due to cavitary lesion for possible tuberculosis along with hemoptysis. Patient denies any shortness of breath, chest pain, lightheadedness or dizziness, nausea/vomiting, or abdominal pain. She denies any fever or feeling unwell. Subjective: Denies any pain. Denies headache, dizziness, or lightheadedness. Denies chest pain or SOB. Complains of productive cough with white/grey sputum and smear of hemoptysis. Denies abdominal pain or nausea. Denies dysuria or hematuria.           Medications:  Reviewed    Infusion Medications    sodium chloride      heparin (PORCINE) Infusion 14 Units/kg/hr (11/11/22 1242)     Scheduled Medications    calcium elemental  500 mg Oral Daily    ferrous sulfate  325 mg Oral BID    montelukast  10 mg Oral Nightly    pantoprazole  40 mg Oral Daily    pravastatin  40 mg Oral Nightly    sotalol  80 mg Oral BID    acyclovir  400 mg Oral TID    valsartan  320 mg Oral Daily    sodium chloride flush  5-40 mL IntraVENous 2 times per day    fluticasone  1 puff Inhalation BID     PRN Meds: sodium chloride flush, sodium chloride, polyethylene glycol, heparin (porcine), heparin (porcine)    No intake or output data in the 24 hours ending 11/11/22 1422    Diet:  ADULT DIET; Regular    Exam:  BP (!) 148/69   Pulse 66   Temp 98.2 °F (36.8 °C) (Oral)   Resp 16   Ht 5' 3\" (1.6 m)   Wt 124 lb (56.2 kg)   SpO2 94%   BMI 21.97 kg/m²     General appearance: No apparent distress, appears stated age and cooperative. HEENT: Pupils equal, round, and reactive to light. Conjunctivae/corneas clear. Neck: Supple, with full range of motion. No jugular venous distention. Trachea midline. Respiratory:  Normal respiratory effort. Clear to auscultation, bilaterally without Rales/Wheezes/Rhonchi. Chronic productive cough with white/grey sputum with occasional hemoptysis. Cardiovascular: Regular rate and rhythm with normal S1/S2 without murmurs, rubs or gallops. Abdomen: Soft, non-tender, non-distended with normal bowel sounds. Musculoskeletal: passive and active ROM x 4 extremities. Skin: Skin color, texture, turgor normal.  No rashes or lesions. Neurologic:  Neurovascularly intact without any focal sensory/motor deficits. Psychiatric: Alert and oriented, thought content appropriate, normal insight  Capillary Refill: Brisk,< 3 seconds   Peripheral Pulses: +2 palpable, equal bilaterally       Labs:   Recent Labs     11/10/22  2151 11/11/22  0407   WBC 8.8 8.3   HGB 10.7* 9.5*   HCT 34.0* 29.2*    250     Recent Labs     11/10/22  2151 11/11/22  0407    140   K 4.7 4.3    104   CO2 24 25   BUN 19 18   CREATININE 0.9 0.7   CALCIUM 8.6 8.5     Recent Labs     11/10/22  2151   AST 19   ALT 18   BILITOT 0.4   ALKPHOS 63     Recent Labs     11/10/22  2151 11/11/22  0408   INR 1.26* 1.26*     No results for input(s): Anibal Ferrara in the last 72 hours. Urinalysis:    No results found for: Magnus Fabry, BACTERIA, RBCUA, BLOODU, [address], [address]    Radiology:  No orders to display       Diet: ADULT DIET;  Regular    DVT prophylaxis: [] Lovenox                                 [] SCDs                                 [] SQ Heparin                                 [] Encourage ambulation           [x] Already on Anticoagulation     Disposition:    [x] Home       [] TCU       [] Rehab       [] Psych       [] SNF       [] [place]       [] Other-    Code Status: Full Code      Electronically signed by Morgan RODRIGUEZ, RN, AG-ACNP Student on 11/11/2022 at 2:22 PM

## 2022-11-11 NOTE — PLAN OF CARE
Problem: Safety - Adult  Goal: Free from fall injury  Outcome: Progressing     Problem: ABCDS Injury Assessment  Goal: Absence of physical injury  Outcome: Progressing     Problem: Respiratory - Adult  Goal: Achieves optimal ventilation and oxygenation  Outcome: Progressing     Problem: Skin/Tissue Integrity - Adult  Goal: Skin integrity remains intact  Outcome: Progressing     Problem: Infection - Adult  Goal: Absence of infection at discharge  Outcome: Progressing     Problem: Infection - Adult  Goal: Isolation precautions  Description: Isolation precautions- airborne  Outcome: Progressing

## 2022-11-11 NOTE — H&P
History & Physical    Patient:  Lourdes Rivera  YOB: 1932  Date of Service: 11/10/2022  MRN: [id number]   Acct:  [de-identified]   Primary Care Physician: Dee Parrish MD    Chief Complaint: Hemoptysis, chronic cough    Assessment and plan:  Chronic cough with occasional hemoptysis: Concern for TB. CT chest from 10/28/2022 showing patchy right middle lobe and right lower lobe airspace opacities some of which are nodular in morphology. There is cavitary 2.0 x 2.8 cm noncalcified right lower lobe pulmonary nodule as well as additional noncavitary noncalcified nodular densities at the right lower lobe they are associated with interstitial prominence and bronchial wall thickening. Likely infectious in etiology. 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 Never treated for TB. AFB with culture x3 ordered. Consult to Pulmonology. Plan for diagnostic bronchoscopy. Airborne precautions. Paroxysmal atrial fibrillation s/p ablation 2016: Will obtain EKG. Patient reports she has been in sinus rhythm since ablation. Typically on Xarelto. [id number] score of 4. Plans for bronchoscopy so we will bridge with heparin per Dr. Aravind Lam request.  Continue sotalol. Telemetry monitoring ordered. Primary hypertension, controlled: Blood pressure 136/63. Continue Losartan. History of asthma: Continue home inhalers and Singulair. History of hyperlipidemia: Continue statin. History of GERD: Continue PPI. History of recurrent shingles: Continue acyclovir. History of Present Illness:   History obtained from chart review and the patient. The patient is a 80 y.o. female who presents with complaints of chronic cough and hemoptysis. Patient reports that she has had a chronic cough ongoing for greater than than 18 months. She states due to her primary doctor is retiring as well as COVID she has been unable to have good follow-up on this problem.   She also reports intermittent hemoptysis although it is mild and not frequent. Patient had referral to pulmonology today and was seen in the office of Dr. Andrew Vaughan. Patient diagnosed with COVID 19 at the end of October and had a CT chest done at that time which showed a lesion in the right lower lobe. Patient does report that this lesion has been seen on previous CT scans although those records are not available in the chart currently. 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 She has never underwent treatment for latent TB infection and chest x-ray previously were negative. Dr. Sun Veras for diagnostic bronchoscopy to further evaluate right lower lobe cavity pneumonia and hemoptysis. Concerned due to cavitary lesion for possible tuberculosis along with hemoptysis. Patient denies any shortness of breath, chest pain, lightheadedness or dizziness, nausea/vomiting, or abdominal pain. She denies any fever or feeling unwell. Past Medical History:        Diagnosis Date    Asthma     Cognitive communication deficit     Left bundle branch block     Pneumonia        Past Surgical History:  History reviewed. No pertinent surgical history. Home Medications:   No current facility-administered medications on file prior to encounter.      Current Outpatient Medications on File Prior to Encounter   Medication Sig Dispense Refill    valsartan (DIOVAN) 320 MG tablet Take 320 mg by mouth daily      sotalol (BETAPACE) 80 MG tablet Take 80 mg by mouth 2 times daily      valACYclovir (VALTREX) 500 MG tablet Take 500 mg by mouth daily      vitamin B-1 (THIAMINE) 100 MG tablet Take 100 mg by mouth daily (Patient not taking: Reported on 11/10/2022)      rivaroxaban (XARELTO) 20 MG TABS tablet Take 20 mg by mouth      zinc gluconate 50 MG tablet Take 50 mg by mouth daily (Patient not taking: Reported on 11/10/2022)      vitamin C (ASCORBIC ACID) 500 MG tablet Take 500 mg by mouth daily (Patient not taking: Reported on 11/10/2022)      calcium carbonate 600 MG TABS tablet Take 1 tablet by mouth daily      Cholecalciferol (VITAMIN D3) 125 MCG (5000 UT) TABS Take by mouth      Dextromethorphan HBr (DELSYM PO) Take by mouth (Patient not taking: Reported on 11/10/2022)      dextromethorphan (DELSYM) 30 MG/5ML extended release liquid Take 60 mg by mouth 2 times daily as needed for Cough (Patient not taking: Reported on 11/10/2022)      ferrous sulfate (IRON 325) 325 (65 Fe) MG tablet Take 325 mg by mouth in the morning and at bedtime      hydroCHLOROthiazide (MICROZIDE) 12.5 MG capsule Take 12.5 mg by mouth daily (Patient not taking: Reported on 11/10/2022)      lisinopril (PRINIVIL;ZESTRIL) 40 MG tablet Take 40 mg by mouth daily (Patient not taking: Reported on 11/10/2022)      meclizine (ANTIVERT) 25 MG tablet Take 25 mg by mouth 3 times daily as needed (Patient not taking: Reported on 11/10/2022)      montelukast (SINGULAIR) 10 MG tablet Take 10 mg by mouth nightly      nitroGLYCERIN (NITROSTAT) 0.4 MG SL tablet Place 0.4 mg under the tongue every 5 minutes as needed for Chest pain up to max of 3 total doses. If no relief after 1 dose, call 911.       polyethylene glycol (GLYCOLAX) 17 g packet Take 17 g by mouth daily as needed for Constipation      potassium chloride (KLOR-CON) 20 MEQ packet Take 20 mEq by mouth 2 times daily (Patient not taking: Reported on 11/10/2022)      pravastatin (PRAVACHOL) 40 MG tablet Take 40 mg by mouth daily      Multiple Vitamins-Minerals (PRESERVISION AREDS 2 PO) Take by mouth      pantoprazole (PROTONIX) 40 MG tablet Take 40 mg by mouth daily      beclomethasone (QVAR REDIHALER) 80 MCG/ACT AERB inhaler Inhale 1 puff into the lungs 2 times daily      senna-docusate (PERICOLACE) 8.6-50 MG per tablet Take 1 tablet by mouth daily (Patient not taking: Reported on 11/10/2022)         Allergies:  Accolate [zafirlukast], Asa [aspirin], Chlordiazepoxide, Ciprofloxacin, Clindamycin/lincomycin, Codeine, Demerol [meperidine hcl], Diazepam, Diclofenac sodium, Ethylenediamine, Evista [raloxifene], Gabapentin, Iodine, Metoprolol, Nitrofurantoin, Oxycodone-acetaminophen, Pcn [penicillins], Phenergan [promethazine], Promethazine hcl, Talwin [pentazocine], Triamcinolone acetonide, and Voltaren [diclofenac]    Social History:    reports that she has never smoked. She has never used smokeless tobacco. She reports that she does not drink alcohol and does not use drugs. Family History:   History reviewed. No pertinent family history. Review of systems:  Constitutional: no fever, no night sweats, no fatigue  Head: no headache, no head injury, no migranes. Eye: no blurring of vision, no double vision. Ears: no hearing difficulty, no tinnitus  Mouth/throat: no ulceration, dental caries, dysphagia  Lungs: no shortness of breath, no wheeze, chronic cough, occasional hemoptysis  CVS: no palpitation, no chest pain, no shortness of breath  GI: no abdominal pain, no nausea , no vomiting, no constipation  KYLE: no dysuria, frequency and urgency, no hematuria, no kidney stones  Musculoskeletal: no joint pain, swelling , stiffness  Endocrine: no polyuria, polydypsia, no cold or heat intolerence  Hematology: no anemia, no easy brusing or bleeding, no hx of clotting disorder  Dermatology: no skin rash, no eczema, no prurities,  Psychiatry: no depression, no anxiety,no panic attacks, no suicide ideation  Neurology: no syncope, no seizures, no numbness or tingling of hands, no numbness or tingling of feet, no paresis    10 point review of systems completed, all other than noted above are negative. Vitals:   Vitals:    11/10/22 2030   BP: 136/63   Pulse: 71   Resp: 18   Temp: 97.9 °F (36.6 °C)      BMI: There is no height or weight on file to calculate BMI.                 Exam:  Physical Examination: General appearance - alert, well appearing, and in no distress  Mental status - alert, oriented to person, place, and time  Neck - supple, no significant adenopathy, no JVD  Chest - clear to auscultation, no wheezes, rales or rhonchi, symmetric air entry  Heart - normal rate, regular rhythm, normal S1, S2, no murmurs, rubs, clicks or gallops  Abdomen - soft, nontender, nondistended, no masses or organomegaly  Neurological - alert, oriented, normal speech, no focal findings or movement disorder noted  Musculoskeletal - no joint tenderness, deformity or swelling  Extremities - peripheral pulses normal, no pedal edema, no clubbing or cyanosis  Skin - normal coloration and turgor, no rashes, no suspicious skin lesions noted      Review of Labs and Diagnostic Testing:    No results found for this or any previous visit (from the past 24 hour(s)). Radiology:     No results found.       EKG: pending      DVT prophylaxis: [] Lovenox                                 [] SCDs                                 [] SQ Heparin                                 [] Encourage ambulation, low risk for DVT, no chemical or mechanical prophylaxis necessary              [x] Already on Anticoagulation                Anticipated Disposition upon discharge: [x] Home                                                                         [] Home with Home Health                                                                         [] [facility]                                                                         [] [address]          Electronically signed by SUZANNE Schwab CNP on 11/10/2022 at 9:32 PM

## 2022-11-11 NOTE — PROCEDURES
12 lead EKG completed. Results handed to [facility].  Mal Lloyd CET

## 2022-11-11 NOTE — CARE COORDINATION
Case Management Assessment  Initial Evaluation    Date/Time of Evaluation: 11/11/2022 3:22 PM  Assessment Completed by: Shirley Cristobal RN    If patient is discharged prior to next notation, then this note serves as note for discharge by case management. Patient Name: Paul Devine                   YOB: 1932  Diagnosis: Tuberculosis [A15.9]  Hemoptysis [R04.2]                   Date / Time: 11/10/2022  8:38 PM    Patient Admission Status: Inpatient     Current PCP: Ramona Resendez MD  PCP verified by CM? Yes    Chart Reviewed: Yes      Patient Orientation: Alert and Oriented, Person, Place, Situation, Self    Patient Cognition: Alert  History Provided by: Patient    Hospitalization in the last 30 days (Readmission):  No    If yes, Readmission Assessment in  Navigator will be completed. Advance Directives:     Code Status: Full Code       Discharge Planning  Patient lives with: Alone Type of Home: Apartment   Primary Caregiver: Self  Patient Support Systems include: Family Members   Current Financial resources: Medicare  Current community resources:    Current services prior to admission: Home Care   Type of Home Care services:  OT, PT, Nursing Services    ADLS  Prior functional level: Independent in ADLs/IADLs  Current functional level: Independent in ADLs/IADLs      Family can provide assistance at DC: Yes  Would you like Case Management to discuss the discharge plan with any other family members/significant others, and if so, who?  No  Plans to Return to Present Housing: Yes  Other Identified Issues/Barriers to RETURNING to current housing: None  Potential Assistance needed at discharge: [address]  Patient expects to discharge to: [address] [place] [address] for transportation at discharge:      Financial  Payor: Boaz Kaur / Plan: [address] [facility] HMO / Product Type: Medicare /     Does insurance require precert for SNF: Yes    Potential assistance Purchasing Medications: Yes  Meds-to-Beds request: Yes      [address], [address] - F [phone]  [facility] [address]  Phone: [phone] Fax: [phone]    [facility],[address]Th Floor, [facility], [address] [address] [phone]  [address] [address]  Phone: [phone] Fax: [phone]      Factors facilitating achievement of predicted outcomes: Family support, Motivated, Cooperative, Pleasant, and Good insight into deficits    Barriers to discharge: Bronch planned for Tuesday, physician trying to get it done sooner. Additional Case Management Notes: Patient was a direct admit from Dr. Carina Fitzgerald for bronchoscopy with biopsy planned for 11/15/2022. Hospitalist consult, Xarelto on hold, Heparin drip, CBC every other day, regular diet, airborne isolation, ST evaluation, telemetry, up with assistance. Procedure: Bronchoscopy with biopsy planned for 11/15/2022. The Plan for Transition of Care is related to the following treatment goals of Tuberculosis [A15.9]  Hemoptysis [R04.2]    Patient Goals/Plan/Treatment Preferences: Met with Leandro Yousif. She resides at [facility]. Leandro Yousif verifies her insurance and PCP. She is able to afford her medications and denies a need for DME. She states she will have transportation to home and is current with [facility] for SN, PT/OT. She plans for the same at discharge. Transportation/Food Security/Housekeeping Addressed: No issues identified.      Alisha Funk RN  Case Management Department

## 2022-11-12 PROBLEM — J45.40 MODERATE PERSISTENT ASTHMA WITHOUT COMPLICATION: Status: ACTIVE | Noted: 2022-11-12

## 2022-11-12 LAB
ANION GAP SERPL CALCULATED.3IONS-SCNC: 10 MEQ/L (ref 8–16)
APTT: 59.1 SECONDS (ref 22–38)
APTT: 68.6 SECONDS (ref 22–38)
APTT: 76.5 SECONDS (ref 22–38)
APTT: NORMAL SECONDS (ref 22–38)
BUN BLDV-MCNC: 15 MG/DL (ref 7–22)
CALCIUM SERPL-MCNC: 8.3 MG/DL (ref 8.5–10.5)
CHLORIDE BLD-SCNC: 102 MEQ/L (ref 98–111)
CO2: 26 MEQ/L (ref 23–33)
CREAT SERPL-MCNC: 0.6 MG/DL (ref 0.4–1.2)
ERYTHROCYTE [DISTWIDTH] IN BLOOD BY AUTOMATED COUNT: 14.4 % (ref 11.5–14.5)
ERYTHROCYTE [DISTWIDTH] IN BLOOD BY AUTOMATED COUNT: 45.6 FL (ref 35–45)
GFR SERPL CREATININE-BSD FRML MDRD: > 60 ML/MIN/1.73M2
GLUCOSE BLD-MCNC: 95 MG/DL (ref 70–108)
HCT VFR BLD CALC: 30.7 % (ref 37–47)
HEMOGLOBIN: 9.9 GM/DL (ref 12–16)
MCH RBC QN AUTO: 28.7 PG (ref 26–33)
MCHC RBC AUTO-ENTMCNC: 32.2 GM/DL (ref 32.2–35.5)
MCV RBC AUTO: 89 FL (ref 81–99)
PLATELET # BLD: 214 THOU/MM3 (ref 130–400)
PMV BLD AUTO: 9.3 FL (ref 9.4–12.4)
POTASSIUM SERPL-SCNC: 4.1 MEQ/L (ref 3.5–5.2)
RBC # BLD: 3.45 MILL/MM3 (ref 4.2–5.4)
REASON FOR REJECTION: NORMAL
REJECTED TEST: NORMAL
SODIUM BLD-SCNC: 138 MEQ/L (ref 135–145)
WBC # BLD: 7.2 THOU/MM3 (ref 4.8–10.8)

## 2022-11-12 PROCEDURE — 85730 THROMBOPLASTIN TIME PARTIAL: CPT

## 2022-11-12 PROCEDURE — 99232 SBSQ HOSP IP/OBS MODERATE 35: CPT | Performed by: NURSE PRACTITIONER

## 2022-11-12 PROCEDURE — 6370000000 HC RX 637 (ALT 250 FOR IP): Performed by: NURSE PRACTITIONER

## 2022-11-12 PROCEDURE — 99232 SBSQ HOSP IP/OBS MODERATE 35: CPT | Performed by: INTERNAL MEDICINE

## 2022-11-12 PROCEDURE — 6360000002 HC RX W HCPCS

## 2022-11-12 PROCEDURE — 1200000003 HC TELEMETRY R&B

## 2022-11-12 PROCEDURE — 36415 COLL VENOUS BLD VENIPUNCTURE: CPT

## 2022-11-12 PROCEDURE — 1200000000 HC SEMI PRIVATE

## 2022-11-12 PROCEDURE — 92610 EVALUATE SWALLOWING FUNCTION: CPT

## 2022-11-12 PROCEDURE — 80048 BASIC METABOLIC PNL TOTAL CA: CPT

## 2022-11-12 PROCEDURE — 6370000000 HC RX 637 (ALT 250 FOR IP)

## 2022-11-12 PROCEDURE — 94640 AIRWAY INHALATION TREATMENT: CPT

## 2022-11-12 PROCEDURE — 85027 COMPLETE CBC AUTOMATED: CPT

## 2022-11-12 RX ORDER — POLYETHYLENE GLYCOL 3350 17 G/17G
17 POWDER, FOR SOLUTION ORAL DAILY
Status: DISCONTINUED | OUTPATIENT
Start: 2022-11-13 | End: 2022-11-20 | Stop reason: HOSPADM

## 2022-11-12 RX ORDER — DOCUSATE SODIUM 100 MG/1
100 CAPSULE, LIQUID FILLED ORAL 2 TIMES DAILY
Status: DISCONTINUED | OUTPATIENT
Start: 2022-11-12 | End: 2022-11-20 | Stop reason: HOSPADM

## 2022-11-12 RX ADMIN — FERROUS SULFATE TAB 325 MG (65 MG ELEMENTAL FE) 325 MG: 325 (65 FE) TAB at 09:25

## 2022-11-12 RX ADMIN — DOCUSATE SODIUM 100 MG: 100 CAPSULE, LIQUID FILLED ORAL at 14:27

## 2022-11-12 RX ADMIN — PRAVASTATIN SODIUM 40 MG: 40 TABLET ORAL at 21:17

## 2022-11-12 RX ADMIN — ACYCLOVIR 400 MG: 400 TABLET ORAL at 14:27

## 2022-11-12 RX ADMIN — MONTELUKAST SODIUM 10 MG: 10 TABLET ORAL at 21:18

## 2022-11-12 RX ADMIN — VALSARTAN 320 MG: 320 TABLET, FILM COATED ORAL at 09:25

## 2022-11-12 RX ADMIN — ACYCLOVIR 400 MG: 400 TABLET ORAL at 21:18

## 2022-11-12 RX ADMIN — FERROUS SULFATE TAB 325 MG (65 MG ELEMENTAL FE) 325 MG: 325 (65 FE) TAB at 17:46

## 2022-11-12 RX ADMIN — FLUTICASONE PROPIONATE 1 PUFF: 110 AEROSOL, METERED RESPIRATORY (INHALATION) at 09:01

## 2022-11-12 RX ADMIN — HEPARIN SODIUM 17.56 UNITS/KG/HR: 10000 INJECTION, SOLUTION INTRAVENOUS at 07:36

## 2022-11-12 RX ADMIN — FLUTICASONE PROPIONATE 1 PUFF: 110 AEROSOL, METERED RESPIRATORY (INHALATION) at 19:37

## 2022-11-12 RX ADMIN — PANTOPRAZOLE SODIUM 40 MG: 40 TABLET, DELAYED RELEASE ORAL at 05:05

## 2022-11-12 RX ADMIN — DOCUSATE SODIUM 100 MG: 100 CAPSULE, LIQUID FILLED ORAL at 21:18

## 2022-11-12 RX ADMIN — SOTALOL HYDROCHLORIDE 80 MG: 80 TABLET ORAL at 21:17

## 2022-11-12 RX ADMIN — CALCIUM 500 MG: 500 TABLET ORAL at 09:24

## 2022-11-12 RX ADMIN — ACYCLOVIR 400 MG: 400 TABLET ORAL at 09:25

## 2022-11-12 NOTE — PROGRESS NOTES
Patient - Alex Muro,  Age - 80 y.o.    - 1932      Room Number - 5K-011-A   Consulting - Greg Robison, * Primary Care Physician - Chris Calvin MD   MRN -  [id number]   Acct # - [de-identified]  Date of Admission -  11/10/2022  8:38 PM  Hospital Day - 2    Chief Complaint   Hemoptysis  HPI   Alex Muro is a 80 y.o. female with PMHx of GERD, recurrent shingles, HLD, HTN, paroxysmal A. fib, Hx of LBBB, asthma who presents to [facility] on 11/10 for symptoms of chronic cough and hemoptysis. Patient has had this cough for minimum of 18 months. She was recently diagnosed with COVID on 10/29/2022 and was hospitalized at [facility], treated with Decadron 6 mg, antibiotic course, and baricitinib. Patient also reports of intermittent hemoptysis which is mild and infrequent. Was seen in the office of  on 11/10 and was transferred to inpatient services. Patient has been treated with several courses of antibiotic by PCP for cough with minimal improvement. 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 Previous chest x-rays were negative. CXR done at [facility] on 10/28 shows slight improvement in the right perihilar infiltrate residual infiltrates persist.  CTA chest on 10/28 showed patchy right middle lobe and right lower lobe airspace opacities some of which are nodular in morphology. There are trace pleural effusions. There is a cavitary 2.0 x 2.8 cm noncalcified right lower lobe pulmonary nodule as well as additional noncavitary noncalcified nodular densities in the right lower lobe that are associated with interstitial prominence and bronchial wall thickening. Findings are likely infectious in etiology. Sequela of old granulomatous disease. Subcentimeter short axis dimension mediastinal and right hilar lymph nodes. Past 24 hours, ROS   Patient has no complaint today.   She has no hemoptysis today. No nausea or vomiting. All other systems reviewed  Objective    Vitals    height is 5' 3\" (1.6 m) and weight is 124 lb (56.2 kg). Her oral temperature is 98 °F (36.7 °C). Her blood pressure is 124/59 (abnormal) and her pulse is 59. Her respiration is 18 and oxygen saturation is 96%. I/O    Intake/Output Summary (Last 24 hours) at 11/12/2022 1446  Last data filed at 11/11/2022 2313  Gross per 24 hour   Intake 166.16 ml   Output --   Net 166.16 ml     Patient Vitals for the past 96 hrs (Last 3 readings):   Weight   11/10/22 2350 124 lb (56.2 kg)     Exam    Physical Exam  Constitutional: Patient appears moderately built and moderately nourished. Head: Normocephalic and atraumatic. Mouth/Throat: Oropharynx is clear and moist.  No oral thrush. Eyes: Conjunctivae are normal. Pupils are equal, round, and reactive to light. No scleral icterus. Neck: Neck supple. No JVD or tracheal deviation present. Cardiovascular: Regular rate, regular rhythm, S1 and S2 with no murmur. No peripheral edema  Pulmonary/Chest: Normal effort with clear breath sounds. No stridor. No respiratory distress. Abdominal: Soft. Bowel sounds audible. No distension or tenderness to palp  Musculoskeletal: Moves all extremities  Lymphadenopathy:  No cervical adenopathy. Neurological: Patient is alert and oriented to person, place, and time. Skin: Skin is warm and dry.     Meds       [START ON 11/13/2022] polyethylene glycol  17 g Oral Daily    docusate sodium  100 mg Oral BID    calcium elemental  500 mg Oral Daily    ferrous sulfate  325 mg Oral BID    montelukast  10 mg Oral Nightly    pantoprazole  40 mg Oral Daily    pravastatin  40 mg Oral Nightly    sotalol  80 mg Oral BID    acyclovir  400 mg Oral TID    valsartan  320 mg Oral Daily    sodium chloride flush  5-40 mL IntraVENous 2 times per day    fluticasone  1 puff Inhalation BID      sodium chloride      heparin (PORCINE) Infusion 17.563 Units/kg/hr (11/12/22 0737)     bisacodyl, sodium chloride flush, sodium chloride, heparin (porcine), heparin (porcine)  Labs   ABG  No results found for: PH, PO2, PCO2, HCO3, O2SAT  No results found for: IFIO2, MODE, SETTIDVOL, SETPEEP  CBC  Recent Labs     11/10/22  2151 11/11/22  0407 11/12/22  0450   WBC 8.8 8.3 7.2   RBC 3.71* 3.27* 3.45*   HGB 10.7* 9.5* 9.9*   HCT 34.0* 29.2* 30.7*   MCV 91.6 89.3 89.0   MCH 28.8 29.1 28.7   MCHC 31.5* 32.5 32.2    250 214   MPV 9.2* 9.2* 9.3*      BMP  Recent Labs     11/10/22  2151 11/11/22  0407 11/12/22  0450    140 138   K 4.7 4.3 4.1    104 102   CO2 24 25 26   BUN 19 18 15   CREATININE 0.9 0.7 0.6   GLUCOSE 91 91 95   CALCIUM 8.6 8.5 8.3*     LFT  Recent Labs     11/10/22  2151   AST 19   ALT 18   BILITOT 0.4   ALKPHOS 63     TROP  No results found for: TROPONINT  BNP  No results found for: PROBNP  D-Dimer  No results found for: DDIMER  Lactic Acid  No results for input(s): LACTA in the last 72 hours. INR  Recent Labs     11/10/22  2151 11/11/22  0408   INR 1.26* 1.26*     PTT  Recent Labs     11/12/22  0040 11/12/22  0450 11/12/22  1315   APTT 59.1* 76.5* 155.0*     Glucose  No results for input(s): POCGLU in the last 72 hours. UA No results for input(s): SPECGRAV, PHUR, COLORU, CLARITYU, MUCUS, PROTEINU, BLOODU, RBCUA, WBCUA, BACTERIA, NITRU, GLUCOSEU, BILIRUBINUR, UROBILINOGEN, KETUA, LABCAST, LABCASTTY, AMORPHOS in the last 72 hours. Invalid input(s): CRYSTALS.     Echo   None in epic  Cultures    Procalcitonin   No results found for: [address] Problems    Diagnosis Date Noted    Chronic a-fib [facility]) [I48.20] 11/11/2022     Priority: Medium    Abnormal CT of the chest [R93.89] 11/11/2022     Priority: Medium    Hemoptysis [R04.2] 11/10/2022     Priority: Medium    Paroxysmal atrial fibrillation ([address] [I48.0] 11/10/2022     Priority: Medium    Cavitary lesion of lung [J98.4] 11/10/2022 Priority: Medium    Primary hypertension [I10] 11/10/2022     Priority: Medium     Assessment    Hemoptysis  Right lower lobe cavitary lesion  Chronic cough  Moderate persistent bronchial asthma  Recent evidence of COVID infection  Paroxysmal atrial fibrillation status post ablation in 2016 on anticoagulation  History of left bundle branch block    Plan      Diagnostic bronchoscopy under fluoroscopy to obtain transbroncial biopsy under fluoroscopy, BAL onTuesday 3pm.   Keep Xarelto on hold. Continue Heparin Drip. Awaiting results from sputum culture for acid-fast bacili. Continue home inhalers. Case discussed with nurse and patient/family. Questions and concerns addressed. Meds and Orders reviewed.     Electronically signed by     Jeannie Alfaro MD on 11/12/2022

## 2022-11-12 NOTE — PLAN OF CARE
Problem: Safety - Adult  Goal: Free from fall injury  Outcome: Progressing     Problem: ABCDS Injury Assessment  Goal: Absence of physical injury  Outcome: Progressing     Problem: Respiratory - Adult  Goal: Achieves optimal ventilation and oxygenation  11/12/2022 0130 by Shea Rosales RN  Outcome: Progressing  11/11/2022 1959 by Karina Martinez RCP  Outcome: Progressing  Goal: Clear lung sounds  11/11/2022 1959 by Karina Martinez RCP  Outcome: Progressing

## 2022-11-12 NOTE — PROGRESS NOTES
Hospitalist Progress Note    Patient:  Tessie Sam      Unit/Bed:5K-11/011-A    YOB: 1932    MRN: [id number]       Acct: [de-identified]     PCP: Margy Gunn MD    Date of Admission: 11/10/2022    Assessment/Plan:    Chronic cough with intermittent hemoptysis:  Concern for TB vs PNA. 10/28 CT chest shows white patchy right middle lobe and right lower lobe airspace opacities some of which are nodular in morphology. There is a cavitary 2.0 x 2.8 cm noncalcified right lower lobe pulmonary nodule as well as additional noncavitary noncalcified nodular densities at the right lower lobe they are associated with interstitial prominence and bronchial wall thickening. Likely infectious in etiology. 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 Never treated for TB. History of recent COVID PNA infection. AFB with culture x3 ordered, pending. Pulm following. Plan for diagnostic bronchoscopy, Tuesday 11/15. Airborne precautions in negative pressure room. SLP to eval and treat for possible aspiration pneumonia. Constipation:  Complains of no BM x2 days, PRN Mirilax ineffective. Change to daily. Schedule colace BID. Add PRN Dulcolax PO. Paroxysmal Atrial Fibrillation, rate controlled:  S/P Ablation 2016. [id number] Score 4, HAS-BLED Score 2. On Xarelto at home, on hold due to plan for bronchoscopy Tuesday. Bridging with Heparin drip. Unfractionated heparin every 6 hours. Monitor on telemetry. Primary HTN:  BP stable. Continue satalol and valsartan with hold parameters. Normocytic anemia. Hbg stable at 9.9  Hyperlipidemia: Continue pravastatin. Recurrent shingles:  No current rash. Continue acyclovir. GERD:  Continue pantoprazole. Asthma: No acute exacerbation. Continue montelukast, fluticasone. LBBB:  Monitor on telemetry. Chief Complaint: hemoptysis, chronic cough    Hospital Course:  The patient is a 80 y.o. female who presents with complaints of chronic cough and hemoptysis. Patient reports that she has had a chronic cough ongoing for greater than than 18 months. She states due to her primary doctor is retiring as well as COVID she has been unable to have good follow-up on this problem. She also reports intermittent hemoptysis although it is mild and not frequent. Patient had referral to pulmonology today and was seen in the office of Dr. [place]. Patient diagnosed with COVID 19 at the end of October and had a CT chest done at that time which showed a lesion in the right lower lobe. Patient does report that this lesion has been seen on previous CT scans although those records are not available in the chart currently. 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 She has never underwent treatment for latent TB infection and chest x-ray previously were negative. Dr. Rustam Dawkins for diagnostic bronchoscopy to further evaluate right lower lobe cavity pneumonia and hemoptysis. Concerned due to cavitary lesion for possible tuberculosis along with hemoptysis. Patient denies any shortness of breath, chest pain, lightheadedness or dizziness, nausea/vomiting, or abdominal pain. She denies any fever or feeling unwell. Subjective: Denies any pain. Denies headache, dizziness, or lightheadedness. Denies chest pain or SOB. Complains of a frequent dry cough. Denies abdominal pain or nausea. Denies dysuria or hematuria.           Medications:  Reviewed    Infusion Medications    sodium chloride      heparin (PORCINE) Infusion 17.563 Units/kg/hr (11/12/22 0737)     Scheduled Medications    calcium elemental  500 mg Oral Daily    ferrous sulfate  325 mg Oral BID    montelukast  10 mg Oral Nightly    pantoprazole  40 mg Oral Daily    pravastatin  40 mg Oral Nightly    sotalol  80 mg Oral BID    acyclovir  400 mg Oral TID    valsartan  320 mg Oral Daily    sodium chloride flush 5-40 mL IntraVENous 2 times per day    fluticasone  1 puff Inhalation BID     PRN Meds: bisacodyl, sodium chloride flush, sodium chloride, polyethylene glycol, heparin (porcine), heparin (porcine)      Intake/Output Summary (Last 24 hours) at 11/12/2022 1332  Last data filed at 11/11/2022 2313  Gross per 24 hour   Intake 166.16 ml   Output --   Net 166.16 ml       Diet:  ADULT DIET; Regular    Exam:  BP (!) 124/59   Pulse 59   Temp 98 °F (36.7 °C) (Oral)   Resp 18   Ht 5' 3\" (1.6 m)   Wt 124 lb (56.2 kg)   SpO2 96%   BMI 21.97 kg/m²     General appearance: No apparent distress, appears stated age and cooperative. HEENT: Pupils equal, round, and reactive to light. Conjunctivae/corneas clear. Neck: Supple, with full range of motion. No jugular venous distention. Trachea midline. Respiratory:  Normal respiratory effort. Clear to auscultation, bilaterally without Rales/Wheezes/Rhonchi. Chronic productive cough with white/grey sputum with occasional hemoptysis. Cardiovascular: Regular rate and rhythm with normal S1/S2 without murmurs, rubs or gallops. Abdomen: Soft, non-tender, non-distended with normal bowel sounds. Musculoskeletal: passive and active ROM x 4 extremities. Skin: Skin color, texture, turgor normal.  No rashes or lesions. Neurologic:  Neurovascularly intact without any focal sensory/motor deficits.    Psychiatric: Alert and oriented, thought content appropriate, normal insight  Capillary Refill: Brisk,< 3 seconds   Peripheral Pulses: +2 palpable, equal bilaterally       Labs:   Recent Labs     11/10/22  2151 11/11/22  0407 11/12/22  0450   WBC 8.8 8.3 7.2   HGB 10.7* 9.5* 9.9*   HCT 34.0* 29.2* 30.7*    250 214       Recent Labs     11/10/22  2151 11/11/22  0407 11/12/22 0450    140 138   K 4.7 4.3 4.1    104 102   CO2 24 25 26   BUN 19 18 15   CREATININE 0.9 0.7 0.6   CALCIUM 8.6 8.5 8.3*       Recent Labs     11/10/22  2151   AST 19   ALT 18   BILITOT 0.4   ALKPHOS 63 Recent Labs     11/10/22  2151 11/11/22  0408   INR 1.26* 1.26*       No results for input(s): [place] in the last 72 hours. Urinalysis:    No results found for: Angella Pinch, BACTERIA, RBCUA, BLOODU, [address], [address]    Radiology:  No orders to display       Diet: ADULT DIET;  Regular    DVT prophylaxis: [] Lovenox                                 [] SCDs                                 [] SQ Heparin                                 [] Encourage ambulation           [x] Already on Anticoagulation     Disposition:    [x] Home       [] TCU       [] Rehab       [] Psych       [] SNF       [] [place]       [] Other-    Code Status: Full Code      Electronically signed by Sami BUSTAMANTEN, RN, AG-ACNP Student on 11/12/2022 at 1:32 PM     [address] [facility], APRN - CNP 11/12/2022 1341

## 2022-11-12 NOTE — PLAN OF CARE
Problem: Respiratory - Adult  Goal: Clear lung sounds  11/12/2022 0904 by Alf Nelson RCP  Outcome: Progressing   Continue with Flovent  Patient agreed on goals

## 2022-11-12 NOTE — PLAN OF CARE
Problem: Respiratory - Adult  Goal: Achieves optimal ventilation and oxygenation  Outcome: Progressing     Problem: Respiratory - Adult  Goal: Clear lung sounds  11/11/2022 1959 by Antonieta Coronado RCP  Outcome: Progressing   Patient mutually agrees to goals.

## 2022-11-12 NOTE — PROGRESS NOTES
11/12/22 1340   Encounter Summary   Encounter Overview/Reason  Spiritual/Emotional Needs   Service Provided For: Patient   Referral/Consult From: Rounding   Last Encounter  11/12/22   Complexity of Encounter Moderate   Begin Time 1330   End Time  1340   Total Time Calculated 10 min   Encounter    Type Initial Screen/Assessment   Spiritual/Emotional needs   Type Spiritual Support   Assessment/Intervention/Outcome   Assessment Calm   Intervention Sustaining Presence/Ministry of presence;Prayer (assurance of)/Bloomer;Nurtured Hope   Outcome Comfort   Assessment: In my encounter with the 80 yr old patient, while rounding  the unit 5K,  I provided spiritual care to patient through conversation, I also came to assess the patient's spiritual needs present. The pt was admitted due to hemoptysis. Interventions:  I provided prayer, emotional support and words of comfort.  provided a listening presence and encouraged pt to share their beliefs and how they support them during their hospitalization. Outcomes: The patient was encouraged and didn't share any further spiritual needs at this time. Plan:  Chaplains will follow-up at a later time for assessment of any spiritual care needs present.

## 2022-11-12 NOTE — PROGRESS NOTES
[address] [id number]  SPEECH THERAPY  STRZ ONC MED 5K  Clinical Swallow Evaluation      SLP Individual Minutes  Time In: 3396  Time Out: 0385  Minutes: 13  Timed Code Treatment Minutes: 0 Minutes       Date: 2022  Patient Name: Tala Harrington      CSN: [id number]   : 1932  (80 y.o.)  Gender: female   Referring Physician:  Omer Cotto MD  Diagnosis: Hemoptysis    History of Present Illness/Injury: Patient admitted to [facility] with above med dx; please refer to physician H&P for full details. Per chart review, \"80 y.o. female who presents with complaints of chronic cough and hemoptysis. Patient reports that she has had a chronic cough ongoing for greater than than 18 months. She states due to her primary doctor is retiring as well as COVID she has been unable to have good follow-up on this problem. She also reports intermittent hemoptysis although it is mild and not frequent. Patient had referral to pulmonology today and was seen in the office of Dr. Sari Sherowod. Patient diagnosed with COVID 19 at the end of October and had a CT chest done at that time which showed a lesion in the right lower lobe. Patient does report that this lesion has been seen on previous CT scans although those records are not available in the chart currently. 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 She has never underwent treatment for latent TB infection and chest x-ray previously were negative. Dr. Amita Obrien for diagnostic bronchoscopy to further evaluate right lower lobe cavity pneumonia and hemoptysis\". ST consulted to complete clinical swallow evaluation to assist with development of dysphagia management POC as indicated.      Past Medical History:   Diagnosis Date    Asthma     Cognitive communication deficit     Left bundle branch block     Pneumonia        SUBJECTIVE:  LILLIAN Walker Riding with approval to proceed with assessment. Upon arrival, patient resting upright in recliner chair, alert and pleasant. 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 Baseline diet of regular solids, thin liquids endorsed. OBJECTIVE:    Pain:  No pain reported. Current Diet: Regular, thin liquids     Respiratory Status:  Room Air    Behavioral Observation:  Alert and Oriented    CRANIAL NERVE ASSESSMENT   CN V (Trigeminal) Closes and Opens Mandible WFL    Rotary Jaw Movement WFL      CN VII (Facial) Cheeks Hold Food out of Sulci WFL    Opens, Closes/Seals, Protrudes, Retracts Lips WFL    General Appearance WFL    Sensation WFL      CN X (Vagus - Pharyngeal) Raises Back of Tongue WFL      CN XI (Accessory) Lifts Soft Palate WFL      CN XII (Hypoglossal) Elevates Tongue Up and Back WFL    Protrusion   WFL    Lateralizes Tongue WFL    Sensation WFL      Other Observations Dentition WFL    Vocal Quality WFL    Cough WFL     Patient Evaluated Using: Thin Liquids and Coarse Solids    Oral Phase:  WFL    Pharyngeal Phase: WFL:  Pharyngeal phase appears WFL but cannot rule out pharyngeal phase deficits from a bedside swallowing evaluation alone. Signs and Symptoms of Laryngeal Penetration/Aspiration: No signs/symptoms of aspiration evident in this evaluation, but cannot rule out silent aspiration. Impressions: Patient presents with oral phase of swallow function that is essentially [facility] with inability to fully discern potential presence of pharyngeal phase deficits without formal instrumentation. Oral phase highly unremarkable during consumption of hard/textured solids with patient demonstrating adequate mastication pattern for textural breakdown, cohesive bolus formation, and manipulation. Thin liquids consumed without overt difficulty and with suspected control/containment of fluid bolus.  NO overt s/s aspiration exhibited across all consistencies/trials consumed, certainly not able to exclude pharyngeal phase dysfunction and/or airway invasion events in its entirety at bedside alone. Patient's swallow physiology does appear appropriate to support PO intake without distress with instrumental evaluation not warranted. Recommend continuation of regular diet with thin liquids. No further ST services are warranted at this time r/t dysphagia management given baseline status of swallow function achieved; please re-consult should further needs be identified. RECOMMENDATIONS/ASSESSMENT:  Instrumental Evaluation: Instrumental evaluation not indicated at this time. Diet Recommendations:  Regular, thin liquids   Strategies:  Full Upright Position, Small Bite/Sip, and Alternate Solids and Liquids   Rehabilitation Potential: excellent  Discharge Recommendations: Home with Home Exercise Program    EDUCATION:  Learner: Patient  Education:  Reviewed results and recommendations of this evaluation, Reviewed diet and strategies, Reviewed signs, symptoms and risks of aspiration, Reviewed ST goals and Plan of Care, and Reviewed recommendations for follow-up  Evaluation of Education: Verbalizes understanding, Demonstrates without assistance, and Family not present    PLAN:  No further speech therapy services indicated.     PATIENT GOAL:    \"Go home\"        Paolo Haynes M.A., [facility]

## 2022-11-13 LAB
ANION GAP SERPL CALCULATED.3IONS-SCNC: 10 MEQ/L (ref 8–16)
BUN BLDV-MCNC: 20 MG/DL (ref 7–22)
CALCIUM SERPL-MCNC: 9 MG/DL (ref 8.5–10.5)
CHLORIDE BLD-SCNC: 100 MEQ/L (ref 98–111)
CO2: 26 MEQ/L (ref 23–33)
CREAT SERPL-MCNC: 0.6 MG/DL (ref 0.4–1.2)
GFR SERPL CREATININE-BSD FRML MDRD: > 60 ML/MIN/1.73M2
GLUCOSE BLD-MCNC: 113 MG/DL (ref 70–108)
HEPARIN UNFRACTIONATED: 0.51 U/ML (ref 0.3–0.7)
HEPARIN UNFRACTIONATED: 0.58 U/ML (ref 0.3–0.7)
HEPARIN UNFRACTIONATED: 0.58 U/ML (ref 0.3–0.7)
HEPARIN UNFRACTIONATED: 0.65 U/ML (ref 0.3–0.7)
POTASSIUM SERPL-SCNC: 4.3 MEQ/L (ref 3.5–5.2)
SODIUM BLD-SCNC: 136 MEQ/L (ref 135–145)

## 2022-11-13 PROCEDURE — 36415 COLL VENOUS BLD VENIPUNCTURE: CPT

## 2022-11-13 PROCEDURE — 6370000000 HC RX 637 (ALT 250 FOR IP): Performed by: NURSE PRACTITIONER

## 2022-11-13 PROCEDURE — 94640 AIRWAY INHALATION TREATMENT: CPT

## 2022-11-13 PROCEDURE — 6360000002 HC RX W HCPCS

## 2022-11-13 PROCEDURE — 1200000000 HC SEMI PRIVATE

## 2022-11-13 PROCEDURE — 6370000000 HC RX 637 (ALT 250 FOR IP)

## 2022-11-13 PROCEDURE — 94760 N-INVAS EAR/PLS OXIMETRY 1: CPT

## 2022-11-13 PROCEDURE — 99232 SBSQ HOSP IP/OBS MODERATE 35: CPT | Performed by: INTERNAL MEDICINE

## 2022-11-13 PROCEDURE — 85520 HEPARIN ASSAY: CPT

## 2022-11-13 PROCEDURE — 99231 SBSQ HOSP IP/OBS SF/LOW 25: CPT | Performed by: NURSE PRACTITIONER

## 2022-11-13 PROCEDURE — 80048 BASIC METABOLIC PNL TOTAL CA: CPT

## 2022-11-13 RX ADMIN — ACYCLOVIR 400 MG: 400 TABLET ORAL at 09:15

## 2022-11-13 RX ADMIN — SOTALOL HYDROCHLORIDE 80 MG: 80 TABLET ORAL at 21:51

## 2022-11-13 RX ADMIN — PRAVASTATIN SODIUM 40 MG: 40 TABLET ORAL at 21:51

## 2022-11-13 RX ADMIN — BISACODYL 10 MG: 5 TABLET, COATED ORAL at 09:29

## 2022-11-13 RX ADMIN — HEPARIN SODIUM 17.56 UNITS/KG/HR: 10000 INJECTION, SOLUTION INTRAVENOUS at 12:52

## 2022-11-13 RX ADMIN — ACYCLOVIR 400 MG: 400 TABLET ORAL at 21:51

## 2022-11-13 RX ADMIN — MONTELUKAST SODIUM 10 MG: 10 TABLET ORAL at 21:51

## 2022-11-13 RX ADMIN — SOTALOL HYDROCHLORIDE 80 MG: 80 TABLET ORAL at 09:17

## 2022-11-13 RX ADMIN — VALSARTAN 320 MG: 320 TABLET, FILM COATED ORAL at 09:18

## 2022-11-13 RX ADMIN — ACYCLOVIR 400 MG: 400 TABLET ORAL at 15:02

## 2022-11-13 RX ADMIN — FERROUS SULFATE TAB 325 MG (65 MG ELEMENTAL FE) 325 MG: 325 (65 FE) TAB at 15:03

## 2022-11-13 RX ADMIN — FLUTICASONE PROPIONATE 1 PUFF: 110 AEROSOL, METERED RESPIRATORY (INHALATION) at 16:51

## 2022-11-13 RX ADMIN — PANTOPRAZOLE SODIUM 40 MG: 40 TABLET, DELAYED RELEASE ORAL at 06:57

## 2022-11-13 RX ADMIN — FLUTICASONE PROPIONATE 1 PUFF: 110 AEROSOL, METERED RESPIRATORY (INHALATION) at 09:04

## 2022-11-13 RX ADMIN — FERROUS SULFATE TAB 325 MG (65 MG ELEMENTAL FE) 325 MG: 325 (65 FE) TAB at 09:16

## 2022-11-13 RX ADMIN — CALCIUM 500 MG: 500 TABLET ORAL at 10:40

## 2022-11-13 NOTE — PLAN OF CARE
drains  Goal: Isolation precautions  Description: Isolation precautions- airborne  Outcome: Progressing

## 2022-11-13 NOTE — PROGRESS NOTES
PT is alerted and Oriented x4. Movement present. One assist. IV in left forearm. Rale sounds present in lungs. Heart rhythm is present and strong. Respirations are moderate, and symmetry chest movement. Pupils equal, round and reactive to light. No pain, tingling, numbness in upper or lower extremities. Bruise on the posterior side of the right calf 4in long to 2in width. Dry patch on coccyx. Cream was applied. Skin is intact and dry upper and lower extremities. Hand grasp is equal and strong bilateral. Pedal and pull push both equal and strong bilateral. Bed is in low position with side rails up. Call light within reach. Pt. Voices no concerns at this time.

## 2022-11-13 NOTE — PLAN OF CARE
Problem: Respiratory - Adult  Goal: Clear lung sounds  11/13/2022 0908 by Richard Vivar RCP  Outcome: Progressing   Continue with Flovent   Patient agreed on goal

## 2022-11-13 NOTE — PROGRESS NOTES
Pt. walked around the room for 5 minutes. Did well. No complaints of pain or weakness. Went to the  while up. Pt is now back in bed. Side rails up and call light within reach. Pt voices no concerns at this time.

## 2022-11-13 NOTE — PLAN OF CARE
Problem: Respiratory - Adult  Goal: Achieves optimal ventilation and oxygenation  Outcome: Progressing     Problem: Respiratory - Adult  Goal: Clear lung sounds  11/12/2022 1940 by Alpa Nassar RCP  Outcome: Progressing   Patient mutually agrees to goals.

## 2022-11-13 NOTE — PROGRESS NOTES
Patient - Amira Moreno,  Age - 80 y.o.    - 1932      Room Number - 5K-11/011-A   Consulting - Yong Chowdary, * Primary Care Physician - Sulma Head MD   MRN -  [id number]   Acct # - [de-identified]  Date of Admission -  11/10/2022  8:38 PM  Hospital Day - 3    Chief Complaint   Hemoptysis  HPI   Amira Moreno is a 80 y.o. female with PMHx of GERD, recurrent shingles, HLD, HTN, paroxysmal A. fib, Hx of LBBB, asthma who presents to [facility] on 11/10 for symptoms of chronic cough and hemoptysis. Patient has had this cough for minimum of 18 months. She was recently diagnosed with COVID on 10/29/2022 and was hospitalized at [facility], treated with Decadron 6 mg, antibiotic course, and baricitinib. Patient also reports of intermittent hemoptysis which is mild and infrequent. Was seen in the office of  on 11/10 and was transferred to inpatient services. Patient has been treated with several courses of antibiotic by PCP for cough with minimal improvement. 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 Previous chest x-rays were negative. CXR done at [facility] on 10/28 shows slight improvement in the right perihilar infiltrate residual infiltrates persist.  CTA chest on 10/28 showed patchy right middle lobe and right lower lobe airspace opacities some of which are nodular in morphology. There are trace pleural effusions. There is a cavitary 2.0 x 2.8 cm noncalcified right lower lobe pulmonary nodule as well as additional noncavitary noncalcified nodular densities in the right lower lobe that are associated with interstitial prominence and bronchial wall thickening. Findings are likely infectious in etiology. Sequela of old granulomatous disease. Subcentimeter short axis dimension mediastinal and right hilar lymph nodes. Past 24 hours, ROS     She has no hemoptysis today.   On heparin drip  No nausea or vomiting. All other systems reviewed  Objective    Vitals    height is 5' 3\" (1.6 m) and weight is 124 lb (56.2 kg). Her axillary temperature is 97.8 °F (36.6 °C). Her blood pressure is 124/59 (abnormal) and her pulse is 69. Her respiration is 18 and oxygen saturation is 94%. I/O    Intake/Output Summary (Last 24 hours) at 11/13/2022 1332  Last data filed at 11/12/2022 1635  Gross per 24 hour   Intake 240 ml   Output --   Net 240 ml       Patient Vitals for the past 96 hrs (Last 3 readings):   Weight   11/10/22 2350 124 lb (56.2 kg)       Exam    Physical Exam  Constitutional: Patient appears moderately built and moderately nourished. Head: Normocephalic and atraumatic. Mouth/Throat: Oropharynx is clear and moist.  No oral thrush. Eyes: Conjunctivae are normal. Pupils are equal, round, and reactive to light. No scleral icterus. Neck: Neck supple. No JVD or tracheal deviation present. Cardiovascular: Regular rate, regular rhythm, S1 and S2 with no murmur. No peripheral edema  Pulmonary/Chest: Normal effort with clear breath sounds. No stridor. No respiratory distress. Abdominal: Soft. Bowel sounds audible. No distension or tenderness to palp  Musculoskeletal: Moves all extremities  Lymphadenopathy:  No cervical adenopathy. Neurological: Patient is alert and oriented to person, place, and time. Skin: Skin is warm and dry.     Meds       polyethylene glycol  17 g Oral Daily    docusate sodium  100 mg Oral BID    calcium elemental  500 mg Oral Daily    ferrous sulfate  325 mg Oral BID    montelukast  10 mg Oral Nightly    pantoprazole  40 mg Oral Daily    pravastatin  40 mg Oral Nightly    sotalol  80 mg Oral BID    acyclovir  400 mg Oral TID    valsartan  320 mg Oral Daily    sodium chloride flush  5-40 mL IntraVENous 2 times per day    fluticasone  1 puff Inhalation BID      sodium chloride      heparin (PORCINE) Infusion 17.563 Units/kg/hr (11/13/22 1252)     bisacodyl, sodium chloride flush, sodium chloride, heparin (porcine), heparin (porcine)  Labs   ABG  No results found for: PH, PO2, PCO2, HCO3, O2SAT  No results found for: GRISELDA Miek  CBC  Recent Labs     11/10/22  2151 11/11/22  0407 11/12/22  0450   WBC 8.8 8.3 7.2   RBC 3.71* 3.27* 3.45*   HGB 10.7* 9.5* 9.9*   HCT 34.0* 29.2* 30.7*   MCV 91.6 89.3 89.0   MCH 28.8 29.1 28.7   MCHC 31.5* 32.5 32.2    250 214   MPV 9.2* 9.2* 9.3*        BMP  Recent Labs     11/11/22 0407 11/12/22 0450 11/13/22  0730    138 136   K 4.3 4.1 4.3    102 100   CO2 25 26 26   BUN 18 15 20   CREATININE 0.7 0.6 0.6   GLUCOSE 91 95 113*   CALCIUM 8.5 8.3* 9.0       LFT  Recent Labs     11/10/22  2151   AST 19   ALT 18   BILITOT 0.4   ALKPHOS 63       TROP  No results found for: TROPONINT  BNP  No results found for: PROBNP  D-Dimer  No results found for: DDIMER  Lactic Acid  No results for input(s): LACTA in the last 72 hours. INR  Recent Labs     11/10/22  2151 11/11/22  0408   INR 1.26* 1.26*       PTT  Recent Labs     11/12/22  0450 11/12/22  1315 11/12/22  1611   APTT 76.5* see below 68.6*       Glucose  No results for input(s): POCGLU in the last 72 hours. UA No results for input(s): SPECGRAV, PHUR, COLORU, CLARITYU, MUCUS, PROTEINU, BLOODU, RBCUA, WBCUA, BACTERIA, NITRU, GLUCOSEU, BILIRUBINUR, UROBILINOGEN, KETUA, LABCAST, LABCASTTY, AMORPHOS in the last 72 hours. Invalid input(s): CRYSTALS.     Echo   None in epic  Cultures    Procalcitonin   No results found for: [address] [place] [address] Blvd Problems    Diagnosis Date Noted    Moderate persistent asthma without complication [A73.70] 37/08/2409     Priority: Medium    [facility] [I48.20] 11/11/2022     Priority: Medium    Abnormal CT of the chest [R93.89] 11/11/2022     Priority: Medium    Hemoptysis [R04.2] 11/10/2022     Priority: Medium    Paroxysmal atrial fibrillation ([facility] [I48.0] 11/10/2022     Priority: Medium    Cavitary lesion of lung [J98.4] 11/10/2022     Priority: Medium    Primary hypertension [I10] 11/10/2022     Priority: Medium     Assessment    Hemoptysis  Right lower lobe cavitary lesion  Chronic cough  Moderate persistent bronchial asthma  Recent evidence of COVID infection  Paroxysmal atrial fibrillation status post ablation in 2016 on anticoagulation  History of left bundle branch block    Plan      Bronchoscopy to obtain transbroncial biopsy under fluoroscopy, BAL onTuesday 3pm.   Keep Xarelto on hold. Continue Heparin Drip. Awaiting results from sputum culture for acid-fast bacili. Continue home inhalers. Case discussed with nurse and patient/family. Questions and concerns addressed. Meds and Orders reviewed.     Electronically signed by     Soraida De Jesus MD on 11/13/2022

## 2022-11-13 NOTE — PLAN OF CARE
Problem: Respiratory - Adult  Goal: Achieves optimal ventilation and oxygenation  11/13/2022 1654 by Mauricio Krishna, SIENA  Outcome: Completed     Problem: Respiratory - Adult  Goal: Clear lung sounds  11/13/2022 1654 by Mauricio Krishna, ELVIAP  Outcome: Progressing

## 2022-11-14 LAB
ERYTHROCYTE [DISTWIDTH] IN BLOOD BY AUTOMATED COUNT: 14.6 % (ref 11.5–14.5)
ERYTHROCYTE [DISTWIDTH] IN BLOOD BY AUTOMATED COUNT: 46 FL (ref 35–45)
HCT VFR BLD CALC: 30.4 % (ref 37–47)
HEMOGLOBIN: 9.7 GM/DL (ref 12–16)
HEPARIN UNFRACTIONATED: 0.58 U/ML (ref 0.3–0.7)
HEPARIN UNFRACTIONATED: 0.68 U/ML (ref 0.3–0.7)
MCH RBC QN AUTO: 28.5 PG (ref 26–33)
MCHC RBC AUTO-ENTMCNC: 31.9 GM/DL (ref 32.2–35.5)
MCV RBC AUTO: 89.4 FL (ref 81–99)
PLATELET # BLD: 201 THOU/MM3 (ref 130–400)
PMV BLD AUTO: 9.3 FL (ref 9.4–12.4)
RBC # BLD: 3.4 MILL/MM3 (ref 4.2–5.4)
WBC # BLD: 7.2 THOU/MM3 (ref 4.8–10.8)

## 2022-11-14 PROCEDURE — 85520 HEPARIN ASSAY: CPT

## 2022-11-14 PROCEDURE — 6370000000 HC RX 637 (ALT 250 FOR IP)

## 2022-11-14 PROCEDURE — 99232 SBSQ HOSP IP/OBS MODERATE 35: CPT | Performed by: INTERNAL MEDICINE

## 2022-11-14 PROCEDURE — 80048 BASIC METABOLIC PNL TOTAL CA: CPT

## 2022-11-14 PROCEDURE — 36415 COLL VENOUS BLD VENIPUNCTURE: CPT

## 2022-11-14 PROCEDURE — 85027 COMPLETE CBC AUTOMATED: CPT

## 2022-11-14 PROCEDURE — 94640 AIRWAY INHALATION TREATMENT: CPT

## 2022-11-14 PROCEDURE — 1200000000 HC SEMI PRIVATE

## 2022-11-14 PROCEDURE — 6370000000 HC RX 637 (ALT 250 FOR IP): Performed by: NURSE PRACTITIONER

## 2022-11-14 PROCEDURE — 99231 SBSQ HOSP IP/OBS SF/LOW 25: CPT | Performed by: NURSE PRACTITIONER

## 2022-11-14 PROCEDURE — 6360000002 HC RX W HCPCS

## 2022-11-14 RX ORDER — EPINEPHRINE 0.1 MG/ML
1 SYRINGE (ML) INJECTION ONCE
Status: DISCONTINUED | OUTPATIENT
Start: 2022-11-15 | End: 2022-11-18

## 2022-11-14 RX ORDER — SODIUM CHLORIDE 450 MG/100ML
INJECTION, SOLUTION INTRAVENOUS CONTINUOUS
Status: DISCONTINUED | OUTPATIENT
Start: 2022-11-15 | End: 2022-11-15

## 2022-11-14 RX ORDER — LIDOCAINE HYDROCHLORIDE 10 MG/ML
10 INJECTION, SOLUTION INFILTRATION; PERINEURAL ONCE
Status: DISCONTINUED | OUTPATIENT
Start: 2022-11-15 | End: 2022-11-18

## 2022-11-14 RX ADMIN — POLYETHYLENE GLYCOL 3350 17 G: 17 POWDER, FOR SOLUTION ORAL at 08:10

## 2022-11-14 RX ADMIN — VALSARTAN 320 MG: 320 TABLET, FILM COATED ORAL at 08:10

## 2022-11-14 RX ADMIN — FERROUS SULFATE TAB 325 MG (65 MG ELEMENTAL FE) 325 MG: 325 (65 FE) TAB at 17:08

## 2022-11-14 RX ADMIN — ACYCLOVIR 400 MG: 400 TABLET ORAL at 14:47

## 2022-11-14 RX ADMIN — ACYCLOVIR 400 MG: 400 TABLET ORAL at 21:21

## 2022-11-14 RX ADMIN — FLUTICASONE PROPIONATE 1 PUFF: 110 AEROSOL, METERED RESPIRATORY (INHALATION) at 16:39

## 2022-11-14 RX ADMIN — CALCIUM 500 MG: 500 TABLET ORAL at 08:10

## 2022-11-14 RX ADMIN — SOTALOL HYDROCHLORIDE 80 MG: 80 TABLET ORAL at 21:21

## 2022-11-14 RX ADMIN — FERROUS SULFATE TAB 325 MG (65 MG ELEMENTAL FE) 325 MG: 325 (65 FE) TAB at 08:10

## 2022-11-14 RX ADMIN — FLUTICASONE PROPIONATE 1 PUFF: 110 AEROSOL, METERED RESPIRATORY (INHALATION) at 07:31

## 2022-11-14 RX ADMIN — MONTELUKAST SODIUM 10 MG: 10 TABLET ORAL at 21:21

## 2022-11-14 RX ADMIN — ACYCLOVIR 400 MG: 400 TABLET ORAL at 08:10

## 2022-11-14 RX ADMIN — SOTALOL HYDROCHLORIDE 80 MG: 80 TABLET ORAL at 08:10

## 2022-11-14 RX ADMIN — HEPARIN SODIUM 17.2 UNITS/KG/HR: 10000 INJECTION, SOLUTION INTRAVENOUS at 11:53

## 2022-11-14 RX ADMIN — PRAVASTATIN SODIUM 40 MG: 40 TABLET ORAL at 21:21

## 2022-11-14 RX ADMIN — PANTOPRAZOLE SODIUM 40 MG: 40 TABLET, DELAYED RELEASE ORAL at 06:42

## 2022-11-14 NOTE — PROGRESS NOTES
Patient - Red Short,  Age - 80 y.o.    - 1932      Room Number - 5K-11/011-A   Consulting - Ricardo Flores, * Primary Care Physician - Yogesh Gaines MD   MRN -  [id number]   Acct # - [de-identified]  Date of Admission -  11/10/2022  8:38 PM  Hospital Day - 4    Chief Complaint   Hemoptysis  HPI   Red Short is a 80 y.o. female with PMHx of GERD, recurrent shingles, HLD, HTN, paroxysmal A. fib, Hx of LBBB, asthma who presents to [facility] on 11/10 for symptoms of chronic cough and hemoptysis. Patient has had this cough for minimum of 18 months. She was recently diagnosed with COVID on 10/29/2022 and was hospitalized at [facility], treated with Decadron 6 mg, antibiotic course, and baricitinib. Patient also reports of intermittent hemoptysis which is mild and infrequent. Was seen in the office of  on 11/10 and was transferred to inpatient services. Patient has been treated with several courses of antibiotic by PCP for cough with minimal improvement. 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 Previous chest x-rays were negative. CXR done at [facility] on 10/28 shows slight improvement in the right perihilar infiltrate residual infiltrates persist.  CTA chest on 10/28 showed patchy right middle lobe and right lower lobe airspace opacities some of which are nodular in morphology. There are trace pleural effusions. There is a cavitary 2.0 x 2.8 cm noncalcified right lower lobe pulmonary nodule as well as additional noncavitary noncalcified nodular densities in the right lower lobe that are associated with interstitial prominence and bronchial wall thickening. Findings are likely infectious in etiology. Sequela of old granulomatous disease. Subcentimeter short axis dimension mediastinal and right hilar lymph nodes.     Past 24 hours, ROS   -On RA   -Denies any hemoptysis, then goes on to say she has small streaks of blood if she coughs forcefully for long periods of time  -denies chest pain, night sweats or recent loss of significant weight  All other systems reviewed  Objective    Vitals    height is 5' 3\" (1.6 m) and weight is 124 lb (56.2 kg). Her oral temperature is 98.4 °F (36.9 °C). Her blood pressure is 130/50 (abnormal) and her pulse is 65. Her respiration is 20 and oxygen saturation is 96%. I/O    Intake/Output Summary (Last 24 hours) at 11/14/2022 1329  Last data filed at 11/14/2022 1247  Gross per 24 hour   Intake 600 ml   Output --   Net 600 ml       Patient Vitals for the past 96 hrs (Last 3 readings):   Weight   11/10/22 2350 124 lb (56.2 kg)       Exam    Physical Exam   Constitutional: No distress on RA. Patient appears moderately built and  moderately nourished. Head: Normocephalic and atraumatic. Mouth/Throat: Oropharynx is clear and moist.  No oral thrush. Eyes: Conjunctivae are normal. Pupils are equal, round. No scleral icterus. Neck: Neck supple. No tracheal deviation present. Cardiovascular: S1 and S2 with no murmur. No peripheral edema  Pulmonary/Chest: Normal effort with bilateral air entry, clear breath sounds. No stridor. No respiratory distress. Patient exhibits no tenderness. Abdominal: Soft. Bowel sounds audible. No distension or tenderness to palp. Musculoskeletal: Moves all extremities  Neurological: Patient is alert and oriented to person, place, and time.    Meds       polyethylene glycol  17 g Oral Daily    docusate sodium  100 mg Oral BID    calcium elemental  500 mg Oral Daily    ferrous sulfate  325 mg Oral BID    montelukast  10 mg Oral Nightly    pantoprazole  40 mg Oral Daily    pravastatin  40 mg Oral Nightly    sotalol  80 mg Oral BID    acyclovir  400 mg Oral TID    valsartan  320 mg Oral Daily    sodium chloride flush  5-40 mL IntraVENous 2 times per day    fluticasone  1 puff Inhalation BID      sodium chloride      heparin (PORCINE) Infusion 17.204 Units/kg/hr (11/14/22 1153)     bisacodyl, sodium chloride flush, sodium chloride, heparin (porcine), heparin (porcine)  Labs   ABG  No results found for: PH, PO2, PCO2, HCO3, O2SAT  No results found for: Koby Fonder, SETPEEP  CBC  Recent Labs     11/12/22  0450 11/14/22  0632   WBC 7.2 7.2   RBC 3.45* 3.40*   HGB 9.9* 9.7*   HCT 30.7* 30.4*   MCV 89.0 89.4   MCH 28.7 28.5   MCHC 32.2 31.9*    201   MPV 9.3* 9.3*        BMP  Recent Labs     11/12/22  0450 11/13/22  0730    136   K 4.1 4.3    100   CO2 26 26   BUN 15 20   CREATININE 0.6 0.6   GLUCOSE 95 113*   CALCIUM 8.3* 9.0       LFT  No results for input(s): AST, ALT, ALB, BILITOT, ALKPHOS, LIPASE in the last 72 hours. Invalid input(s): AMYLASE    TROP  No results found for: TROPONINT  BNP  No results found for: PROBNP  D-Dimer  No results found for: DDIMER  Lactic Acid  No results for input(s): LACTA in the last 72 hours. INR  No results for input(s): INR, PROTIME in the last 72 hours. PTT  Recent Labs     11/12/22  0450 11/12/22  1315 11/12/22  1611   APTT 76.5* see below 68.6*       Glucose  No results for input(s): POCGLU in the last 72 hours. UA No results for input(s): SPECGRAV, PHUR, COLORU, CLARITYU, MUCUS, PROTEINU, BLOODU, RBCUA, WBCUA, BACTERIA, NITRU, GLUCOSEU, BILIRUBINUR, UROBILINOGEN, KETUA, LABCAST, LABCASTTY, AMORPHOS in the last 72 hours. Invalid input(s): CRYSTALS.     Echo   None in epic  Cultures    Procalcitonin   No results found for: [address] Problems    Diagnosis Date Noted    Moderate persistent asthma without complication [L36.02] 85/01/5009     Priority: Medium    Chronic a-fib ([address] [I48.20] 11/11/2022     Priority: Medium    Abnormal CT of the chest [R93.89] 11/11/2022     Priority: Medium    Hemoptysis [R04.2] 11/10/2022     Priority: Medium    Paroxysmal atrial fibrillation ([facility] [address] [I48.0] 11/10/2022 Priority: Medium    Cavitary lesion of lung [J98.4] 11/10/2022     Priority: Medium    Primary hypertension [I10] 11/10/2022     Priority: Medium     Assessment    -Hemoptysis- ? Etiology  -Right lower lobe cavitary lesion  -Chronic cough  -Moderate persistent bronchial asthma  -Recent evidence of COVID infection  -Paroxysmal atrial fibrillation status post ablation in 2016 on anticoagulation  -History of left bundle branch block  -Hx bronchial asthma on ICS and LIBRADO per PCP    Plan   -Scheduled for Bronchoscopy to obtain transbroncial biopsy under fluoroscopy, and BAL on Tuesday 3pm.   -Keep Xarelto on hold. -Continue Heparin Drip. Hold starting Tuesday at 0900 AM  -AFB x3 order unable to obtain sample   -Continue home inhalers  -Explained bronchoscopy procedure including risks and benefits patient verbalized understanding risks reviewed included but not limited to infection bleeding and possible pneumothorax requiring placement of chest tube and possible mechanical ventilation she verbalized understanding agreed to risks   -NPO at midnight   -DVT prophylaxis on heparin gtt    Case discussed with nurse and patient/family. Questions and concerns addressed. Meds and Orders reviewed. Electronically signed by   SUZANNE Pantoja - CNP on 11/14/2022     Addendum by Dr. Missael Saleem MD:  Patient seen by me independently including key components of medical care. Face to face evaluation and examination was performed. Case discussed with Mr. Tara Patel CNP  Italicized font, if present,  represents changes to the note made by me. More than 50% of the encounter time involved with patient care by the Pulmonary & Critical care service team spent by me . Please see my modifications mentioned below:  She is on room air  Not in distress  Patient not able to produce any sputum sample to send for acid-fast bacilli smear and cultures  No further hemoptysis.   [facility] educated and informed about bronchoscopy procedure,method, complicantions of procedure including but not limited to development of pneumothorax with requirement of chest tube placement. She agreed for the procedure and verbalizes understanding.     Electronically signed by   Venus Desai MD on 11/14/2022 at 6:34 PM

## 2022-11-14 NOTE — PROGRESS NOTES
Hospitalist Progress Note    Patient:  Talha Arcos      Unit/Bed:5K-11/011-A    YOB: 1932    MRN: [id number]       Acct: [de-identified]     PCP: Justin Nguyen MD    Date of Admission: 11/10/2022    Assessment/Plan:    Chronic cough with intermittent hemoptysis:  Concern for TB vs PNA. 10/28 CT chest shows white patchy right middle lobe and right lower lobe airspace opacities some of which are nodular in morphology. There is a cavitary 2.0 x 2.8 cm noncalcified right lower lobe pulmonary nodule as well as additional noncavitary noncalcified nodular densities at the right lower lobe they are associated with interstitial prominence and bronchial wall thickening. Likely infectious in etiology. 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 Never treated for TB. History of recent COVID PNA infection. AFB with culture x3 ordered, pending. Pulm following. Plan for diagnostic bronchoscopy, Tuesday 11/15. Airborne precautions in negative pressure room. SLP seen and recs for regular thin liquid diet, no further speech therapy services indicated. .     Constipation (resolved):  Miralax daily, Colace BID. PRN Dulcolax PO. Paroxysmal Atrial Fibrillation, rate controlled:  S/P Ablation 2016. [id number] Score 4, HAS-BLED Score 2. 11/10 EKG NSR. On Xarelto at home, on hold due to plan for bronchoscopy Tuesday. Bridging with Heparin drip. Unfractionated heparin every 24 hours. Primary HTN:  BP stable. Continue satalol and valsartan with hold parameters. Normocytic anemia:  Hbg stable at 9.9. Hyperlipidemia:   Continue pravastatin. Recurrent shingles:  No current rash. Continue acyclovir. GERD:  Continue pantoprazole. Asthma:   No acute exacerbation. Continue montelukast, fluticasone. History of LBBB:  11/10 EKG NSR    Dispo: bronch tomorrow at 1500. Chief Complaint: hemoptysis, chronic cough    Hospital Course:  The patient is a 80 y.o. female who presents with complaints of chronic cough and hemoptysis. Patient reports that she has had a chronic cough ongoing for greater than than 18 months. She states due to her primary doctor is retiring as well as COVID she has been unable to have good follow-up on this problem. She also reports intermittent hemoptysis although it is mild and not frequent. Patient had referral to pulmonology today and was seen in the office of Dr. Douglas Urena. Patient diagnosed with COVID 19 at the end of October and had a CT chest done at that time which showed a lesion in the right lower lobe. Patient does report that this lesion has been seen on previous CT scans although those records are not available in the chart currently. 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 She has never underwent treatment for latent TB infection and chest x-ray previously were negative. Dr. Jesus Butler for diagnostic bronchoscopy to further evaluate right lower lobe cavity pneumonia and hemoptysis. Concerned due to cavitary lesion for possible tuberculosis along with hemoptysis. Patient denies any shortness of breath, chest pain, lightheadedness or dizziness, nausea/vomiting, or abdominal pain. She denies any fever or feeling unwell. Subjective: Resting in bed. Denies any pain. Denies headache, dizziness, or lightheadedness. Denies chest pain or SOB. Denies abdominal pain or nausea. Denies dysuria or hematuria.         Medications:  Reviewed    Infusion Medications    sodium chloride      heparin (PORCINE) Infusion 17.563 Units/kg/hr (11/13/22 1252)     Scheduled Medications    polyethylene glycol  17 g Oral Daily    docusate sodium  100 mg Oral BID    calcium elemental  500 mg Oral Daily    ferrous sulfate  325 mg Oral BID    montelukast  10 mg Oral Nightly    pantoprazole  40 mg Oral Daily    pravastatin  40 mg Oral Nightly    sotalol  80 mg Oral BID    acyclovir 400 mg Oral TID    valsartan  320 mg Oral Daily    sodium chloride flush  5-40 mL IntraVENous 2 times per day    fluticasone  1 puff Inhalation BID     PRN Meds: bisacodyl, sodium chloride flush, sodium chloride, heparin (porcine), heparin (porcine)      Intake/Output Summary (Last 24 hours) at 11/14/2022 1119  Last data filed at 11/14/2022 0859  Gross per 24 hour   Intake 360 ml   Output --   Net 360 ml         Diet:  ADULT DIET; Regular    Exam:  /60   Pulse 68   Temp 98 °F (36.7 °C) (Oral)   Resp 24   Ht 5' 3\" (1.6 m)   Wt 124 lb (56.2 kg)   SpO2 96%   BMI 21.97 kg/m²     General appearance: No apparent distress, appears stated age and cooperative. HEENT: Pupils equal, round, and reactive to light. Conjunctivae/corneas clear. Neck: Supple, with full range of motion. No jugular venous distention. Trachea midline. Respiratory:  Normal respiratory effort. Clear to auscultation, bilaterally without Rales/Wheezes/Rhonchi. Chronic productive cough with white/grey sputum with occasional hemoptysis. Cardiovascular: Regular rate and rhythm with normal S1/S2 without murmurs, rubs or gallops. Abdomen: Soft, non-tender, non-distended with normal bowel sounds. Musculoskeletal: passive and active ROM x 4 extremities. Skin: Skin color, texture, turgor normal.  No rashes or lesions. Neurologic:  Neurovascularly intact without any focal sensory/motor deficits. Psychiatric: Alert and oriented, thought content appropriate, normal insight. Capillary Refill: Brisk,< 3 seconds   Peripheral Pulses: +2 palpable, equal bilaterally       Labs:   Recent Labs     11/12/22  0450 11/14/22  0632   WBC 7.2 7.2   HGB 9.9* 9.7*   HCT 30.7* 30.4*    201       Recent Labs     11/12/22  0450 11/13/22  0730    136   K 4.1 4.3    100   CO2 26 26   BUN 15 20   CREATININE 0.6 0.6   CALCIUM 8.3* 9.0       No results for input(s): AST, ALT, BILIDIR, BILITOT, ALKPHOS in the last 72 hours.     No results for input(s): INR in the last 72 hours. No results for input(s): Corky Stallion in the last 72 hours. Urinalysis:    No results found for: Trisha Morgan, BACTERIA, RBCUA, BLOODU, [address]    Radiology:  No orders to display       Diet: ADULT DIET;  Regular    DVT prophylaxis: [] Lovenox                                 [] SCDs                                 [] SQ Heparin                                 [] Encourage ambulation           [x] Already on Anticoagulation     Disposition:    [x] Home       [] TCU       [] Rehab       [] Psych       [] SNF       [] [place]       [] Other-    Code Status: Full Code      Electronically signed by Chris BUSTAMANTEN, RN, AG-ACNP Student on 11/14/2022 at 11:19 AM     SUZANNE Saavedra - CNP 11/14/2022 0532

## 2022-11-14 NOTE — PLAN OF CARE
Problem: Respiratory - Adult  Goal: Clear lung sounds  11/14/2022 1642 by Sylvie Tompkins RCP  Outcome: Progressing  Note: Mdi to maintain open airways. Patient mutually agreed on goals.

## 2022-11-14 NOTE — PLAN OF CARE
Isolation precautions  Description: Isolation precautions- airborne  Outcome: Progressing     Problem: Discharge Planning  Goal: Discharge to home or other facility with appropriate resources  11/14/2022 1232 by Nilay Agarwal RN  Outcome: Progressing  Flowsheets (Taken 11/14/2022 1232)  Discharge to home or other facility with appropriate resources:   Identify barriers to discharge with patient and caregiver   Arrange for needed discharge resources and transportation as appropriate   Identify discharge learning needs (meds, wound care, etc)   Refer to discharge planning if patient needs post-hospital services based on physician order or complex needs related to functional status, cognitive ability or social support system   Care plan reviewed with patient. Patient verbalize understanding of the plan of care and contribute to goal setting.

## 2022-11-14 NOTE — CARE COORDINATION
DISCHARGE/PLANNING EVALUATION  11/14/22, 9:10 AM EST    Reason for Referral: Patient is current with [facility]  Mental Status: Answered questions appropriately  Decision Making: Independent  Family/Social/Home Environment: Lives at [address] including food security, transportation and housekeeping: Current with [facility] for RN and PT services. Current Equipment: [address]: [employer]  Concerns or Barriers to Discharge: None  Post-acute [facility]) provider list was provided to patient. Patient was informed of their freedom to choose [facility] provider. Discussed and offered to show the patient the relevant [facility] Providers quality and resource use measures on Medicare Compare web site via computer based on patient's goals of care and treatment preferences. Questions regarding selection process were answered. Teach Back Method used with Zackery Ng regarding care plan and discharge planning  Patient verbalize understanding of the plan of care and contribute to goal setting. Patient goals, treatment preferences and discharge plan: Return to [address] [place] [address]. and current [facility] for RN, and PT services. Spoke with Enio Bee from the agency and confirmed services.      Electronically signed by TIA Lopes on 11/14/2022 at 9:10 AM

## 2022-11-14 NOTE — PLAN OF CARE
Problem: Discharge Planning  Goal: Discharge to home or other facility with appropriate resources  Outcome: Progressing       Consult received. Please see SW note dated 11/14.

## 2022-11-15 ENCOUNTER — ANESTHESIA (OUTPATIENT)
Dept: ENDOSCOPY | Age: 87
DRG: 178 | End: 2022-11-15
Payer: MEDICARE

## 2022-11-15 ENCOUNTER — APPOINTMENT (OUTPATIENT)
Dept: GENERAL RADIOLOGY | Age: 87
DRG: 178 | End: 2022-11-15
Attending: INTERNAL MEDICINE
Payer: MEDICARE

## 2022-11-15 ENCOUNTER — ANESTHESIA EVENT (OUTPATIENT)
Dept: ENDOSCOPY | Age: 87
DRG: 178 | End: 2022-11-15
Payer: MEDICARE

## 2022-11-15 LAB
ACINETOBACTER CALCOACETICUS-BAUMANNII BY PCR: NOT DETECTED
ACINETOBACTER CALCOACETICUS-BAUMANNII BY PCR: NOT DETECTED
ADENOVIRUS BY PCR: NOT DETECTED
ADENOVIRUS BY PCR: NOT DETECTED
ANION GAP SERPL CALCULATED.3IONS-SCNC: 10 MEQ/L (ref 8–16)
BUN BLDV-MCNC: 15 MG/DL (ref 7–22)
CALCIUM SERPL-MCNC: 8.3 MG/DL (ref 8.5–10.5)
CHLAMYDIA PNEUMONIAE BY PCR: NOT DETECTED
CHLAMYDIA PNEUMONIAE BY PCR: NOT DETECTED
CHLORIDE BLD-SCNC: 101 MEQ/L (ref 98–111)
CO2: 26 MEQ/L (ref 23–33)
CREAT SERPL-MCNC: 0.8 MG/DL (ref 0.4–1.2)
CYTOLOGY-NON GYN: NORMAL
CYTOLOGY-NON GYN: NORMAL
ENTEROBACTER CLOACAE COMPLEX BY PCR: NOT DETECTED
ENTEROBACTER CLOACAE COMPLEX BY PCR: NOT DETECTED
ESCHERICHIA COLI BY PCR: NOT DETECTED
ESCHERICHIA COLI BY PCR: NOT DETECTED
GFR SERPL CREATININE-BSD FRML MDRD: > 60 ML/MIN/1.73M2
GLUCOSE BLD-MCNC: 95 MG/DL (ref 70–108)
HAEMOPHILUS INFLUENZAE BY PCR: NOT DETECTED
HAEMOPHILUS INFLUENZAE BY PCR: NOT DETECTED
HEPARIN UNFRACTIONATED: 0.54 U/ML (ref 0.3–0.7)
INFLUENZA A BY PCR: NOT DETECTED
INFLUENZA A BY PCR: NOT DETECTED
INFLUENZA B BY PCR: NOT DETECTED
INFLUENZA B BY PCR: NOT DETECTED
KLEBSIELLA AEROGENES BY PCR: NOT DETECTED
KLEBSIELLA AEROGENES BY PCR: NOT DETECTED
KLEBSIELLA OXYTOCA BY PCR: NOT DETECTED
KLEBSIELLA OXYTOCA BY PCR: NOT DETECTED
KLEBSIELLA PNEUMONIAE GROUP BY PCR: NOT DETECTED
KLEBSIELLA PNEUMONIAE GROUP BY PCR: NOT DETECTED
LEGIONELLA PNEUMOPHILIA BY PCR: NOT DETECTED
LEGIONELLA PNEUMOPHILIA BY PCR: NOT DETECTED
METAPNEUMOVIRUS BY PCR: NOT DETECTED
METAPNEUMOVIRUS BY PCR: NOT DETECTED
MORAXELLA CATARRHALIS BY PCR: NOT DETECTED
MORAXELLA CATARRHALIS BY PCR: NOT DETECTED
MYCOPLASMA PNEUMONIAE BY PCR: NOT DETECTED
MYCOPLASMA PNEUMONIAE BY PCR: NOT DETECTED
NON-SARS CORONAVIRUS: NOT DETECTED
NON-SARS CORONAVIRUS: NOT DETECTED
PARAINFLUENZA VIRUS BY PCR: NOT DETECTED
PARAINFLUENZA VIRUS BY PCR: NOT DETECTED
POTASSIUM SERPL-SCNC: 4.3 MEQ/L (ref 3.5–5.2)
PROTEUS SPECIES BY PCR: NOT DETECTED
PROTEUS SPECIES BY PCR: NOT DETECTED
PSEUDOMONAS AERUGINOSA BY PCR: DETECTED
PSEUDOMONAS AERUGINOSA BY PCR: DETECTED
RESISTANT GENE CTX-M BY PCR: NOT DETECTED
RESISTANT GENE CTX-M BY PCR: NOT DETECTED
RESISTANT GENE IMP BY PCR: NOT DETECTED
RESISTANT GENE IMP BY PCR: NOT DETECTED
RESISTANT GENE KPC BY PCR: NOT DETECTED
RESISTANT GENE KPC BY PCR: NOT DETECTED
RESISTANT GENE MECA/C & MREJ BY PCR: NOT DETECTED
RESISTANT GENE MECA/C & MREJ BY PCR: NOT DETECTED
RESISTANT GENE NDM BY PCR: NOT DETECTED
RESISTANT GENE NDM BY PCR: NOT DETECTED
RESISTANT GENE OXA-48-LIKE BY PCR: ABNORMAL
RESISTANT GENE OXA-48-LIKE BY PCR: ABNORMAL
RESISTANT GENE VIM BY PCR: NOT DETECTED
RESISTANT GENE VIM BY PCR: NOT DETECTED
RESPIRATORY SYNCYTIAL VIRUS BY PCR: NOT DETECTED
RESPIRATORY SYNCYTIAL VIRUS BY PCR: NOT DETECTED
RHINOVIRUS ENTEROVIRUS PCR: NOT DETECTED
RHINOVIRUS ENTEROVIRUS PCR: NOT DETECTED
SERRATIA MARCESCENS BY PCR: NOT DETECTED
SERRATIA MARCESCENS BY PCR: NOT DETECTED
SODIUM BLD-SCNC: 137 MEQ/L (ref 135–145)
SOURCE: ABNORMAL
SOURCE: ABNORMAL
SPECIMEN ACCEPTABILITY: ABNORMAL
SPECIMEN ACCEPTABILITY: ABNORMAL
STAPH AUREUS BY PCR: DETECTED
STAPH AUREUS BY PCR: DETECTED
STREP AGALACTIAE BY PCR: NOT DETECTED
STREP AGALACTIAE BY PCR: NOT DETECTED
STREP PNEUMONIAE BY PCR: NOT DETECTED
STREP PNEUMONIAE BY PCR: NOT DETECTED
STREP PYOGENES BY PCR: NOT DETECTED
STREP PYOGENES BY PCR: NOT DETECTED

## 2022-11-15 PROCEDURE — 87102 FUNGUS ISOLATION CULTURE: CPT

## 2022-11-15 PROCEDURE — 88112 CYTOPATH CELL ENHANCE TECH: CPT

## 2022-11-15 PROCEDURE — 31623 DX BRONCHOSCOPE/BRUSH: CPT | Performed by: INTERNAL MEDICINE

## 2022-11-15 PROCEDURE — 87206 SMEAR FLUORESCENT/ACID STAI: CPT

## 2022-11-15 PROCEDURE — 2580000003 HC RX 258: Performed by: INTERNAL MEDICINE

## 2022-11-15 PROCEDURE — 88108 CYTOPATH CONCENTRATE TECH: CPT

## 2022-11-15 PROCEDURE — 0BD58ZX EXTRACTION OF RIGHT MIDDLE LOBE BRONCHUS, VIA NATURAL OR ARTIFICIAL OPENING ENDOSCOPIC, DIAGNOSTIC: ICD-10-PCS | Performed by: INTERNAL MEDICINE

## 2022-11-15 PROCEDURE — 3700000001 HC ADD 15 MINUTES (ANESTHESIA): Performed by: INTERNAL MEDICINE

## 2022-11-15 PROCEDURE — 99231 SBSQ HOSP IP/OBS SF/LOW 25: CPT | Performed by: NURSE PRACTITIONER

## 2022-11-15 PROCEDURE — 0BD68ZX EXTRACTION OF RIGHT LOWER LOBE BRONCHUS, VIA NATURAL OR ARTIFICIAL OPENING ENDOSCOPIC, DIAGNOSTIC: ICD-10-PCS | Performed by: INTERNAL MEDICINE

## 2022-11-15 PROCEDURE — 99232 SBSQ HOSP IP/OBS MODERATE 35: CPT | Performed by: NURSE PRACTITIONER

## 2022-11-15 PROCEDURE — 31624 DX BRONCHOSCOPE/LAVAGE: CPT | Performed by: INTERNAL MEDICINE

## 2022-11-15 PROCEDURE — 87116 MYCOBACTERIA CULTURE: CPT

## 2022-11-15 PROCEDURE — 7100000000 HC PACU RECOVERY - FIRST 15 MIN: Performed by: INTERNAL MEDICINE

## 2022-11-15 PROCEDURE — 7100000001 HC PACU RECOVERY - ADDTL 15 MIN: Performed by: INTERNAL MEDICINE

## 2022-11-15 PROCEDURE — 87070 CULTURE OTHR SPECIMN AEROBIC: CPT

## 2022-11-15 PROCEDURE — 87305 ASPERGILLUS AG IA: CPT

## 2022-11-15 PROCEDURE — 6370000000 HC RX 637 (ALT 250 FOR IP)

## 2022-11-15 PROCEDURE — 88312 SPECIAL STAINS GROUP 1: CPT

## 2022-11-15 PROCEDURE — 1200000000 HC SEMI PRIVATE

## 2022-11-15 PROCEDURE — 6370000000 HC RX 637 (ALT 250 FOR IP): Performed by: NURSE PRACTITIONER

## 2022-11-15 PROCEDURE — 3609011100 HC BRONCHOSCOPY BRUSHINGS: Performed by: INTERNAL MEDICINE

## 2022-11-15 PROCEDURE — 87631 RESP VIRUS 3-5 TARGETS: CPT

## 2022-11-15 PROCEDURE — 3209999900 FLUORO FOR SURGICAL PROCEDURES

## 2022-11-15 PROCEDURE — 87529 HSV DNA AMP PROBE: CPT

## 2022-11-15 PROCEDURE — 3609010800 HC BRONCHOSCOPY ALVEOLAR LAVAGE: Performed by: INTERNAL MEDICINE

## 2022-11-15 PROCEDURE — 36415 COLL VENOUS BLD VENIPUNCTURE: CPT

## 2022-11-15 PROCEDURE — 94640 AIRWAY INHALATION TREATMENT: CPT

## 2022-11-15 PROCEDURE — 87150 DNA/RNA AMPLIFIED PROBE: CPT

## 2022-11-15 PROCEDURE — 87496 CYTOMEG DNA AMP PROBE: CPT

## 2022-11-15 PROCEDURE — 71045 X-RAY EXAM CHEST 1 VIEW: CPT

## 2022-11-15 PROCEDURE — 87798 DETECT AGENT NOS DNA AMP: CPT

## 2022-11-15 PROCEDURE — 2580000003 HC RX 258

## 2022-11-15 PROCEDURE — 6360000002 HC RX W HCPCS: Performed by: NURSE ANESTHETIST, CERTIFIED REGISTERED

## 2022-11-15 PROCEDURE — 88104 CYTOPATH FL NONGYN SMEARS: CPT

## 2022-11-15 PROCEDURE — 0BD48ZX EXTRACTION OF RIGHT UPPER LOBE BRONCHUS, VIA NATURAL OR ARTIFICIAL OPENING ENDOSCOPIC, DIAGNOSTIC: ICD-10-PCS | Performed by: INTERNAL MEDICINE

## 2022-11-15 PROCEDURE — 87205 SMEAR GRAM STAIN: CPT

## 2022-11-15 PROCEDURE — 87186 SC STD MICRODIL/AGAR DIL: CPT

## 2022-11-15 PROCEDURE — 0B9F8ZX DRAINAGE OF RIGHT LOWER LUNG LOBE, VIA NATURAL OR ARTIFICIAL OPENING ENDOSCOPIC, DIAGNOSTIC: ICD-10-PCS | Performed by: INTERNAL MEDICINE

## 2022-11-15 PROCEDURE — 6370000000 HC RX 637 (ALT 250 FOR IP): Performed by: INTERNAL MEDICINE

## 2022-11-15 PROCEDURE — 87255 GENET VIRUS ISOLATE HSV: CPT

## 2022-11-15 PROCEDURE — 87077 CULTURE AEROBIC IDENTIFY: CPT

## 2022-11-15 PROCEDURE — 87486 CHLMYD PNEUM DNA AMP PROBE: CPT

## 2022-11-15 PROCEDURE — 87581 M.PNEUMON DNA AMP PROBE: CPT

## 2022-11-15 PROCEDURE — 2500000003 HC RX 250 WO HCPCS: Performed by: NURSE ANESTHETIST, CERTIFIED REGISTERED

## 2022-11-15 PROCEDURE — 88305 TISSUE EXAM BY PATHOLOGIST: CPT

## 2022-11-15 PROCEDURE — 87541 LEGION PNEUMO DNA AMP PROB: CPT

## 2022-11-15 PROCEDURE — 3700000000 HC ANESTHESIA ATTENDED CARE: Performed by: INTERNAL MEDICINE

## 2022-11-15 RX ORDER — SUCCINYLCHOLINE CHLORIDE 20 MG/ML
INJECTION INTRAMUSCULAR; INTRAVENOUS PRN
Status: DISCONTINUED | OUTPATIENT
Start: 2022-11-15 | End: 2022-11-15 | Stop reason: SDUPTHER

## 2022-11-15 RX ORDER — ROCURONIUM BROMIDE 10 MG/ML
INJECTION, SOLUTION INTRAVENOUS PRN
Status: DISCONTINUED | OUTPATIENT
Start: 2022-11-15 | End: 2022-11-15 | Stop reason: SDUPTHER

## 2022-11-15 RX ORDER — PROPOFOL 10 MG/ML
INJECTION, EMULSION INTRAVENOUS PRN
Status: DISCONTINUED | OUTPATIENT
Start: 2022-11-15 | End: 2022-11-15 | Stop reason: SDUPTHER

## 2022-11-15 RX ORDER — LIDOCAINE HYDROCHLORIDE 20 MG/ML
INJECTION, SOLUTION INTRAVENOUS PRN
Status: DISCONTINUED | OUTPATIENT
Start: 2022-11-15 | End: 2022-11-15 | Stop reason: SDUPTHER

## 2022-11-15 RX ORDER — ONDANSETRON 2 MG/ML
INJECTION INTRAMUSCULAR; INTRAVENOUS PRN
Status: DISCONTINUED | OUTPATIENT
Start: 2022-11-15 | End: 2022-11-15 | Stop reason: SDUPTHER

## 2022-11-15 RX ORDER — DEXAMETHASONE SODIUM PHOSPHATE 4 MG/ML
INJECTION, SOLUTION INTRA-ARTICULAR; INTRALESIONAL; INTRAMUSCULAR; INTRAVENOUS; SOFT TISSUE PRN
Status: DISCONTINUED | OUTPATIENT
Start: 2022-11-15 | End: 2022-11-15 | Stop reason: SDUPTHER

## 2022-11-15 RX ADMIN — PHENYLEPHRINE HYDROCHLORIDE 200 MCG: 10 INJECTION INTRAVENOUS at 16:08

## 2022-11-15 RX ADMIN — SODIUM CHLORIDE, PRESERVATIVE FREE 10 ML: 5 INJECTION INTRAVENOUS at 08:43

## 2022-11-15 RX ADMIN — BISACODYL 10 MG: 5 TABLET, COATED ORAL at 08:43

## 2022-11-15 RX ADMIN — DEXAMETHASONE SODIUM PHOSPHATE 10 MG: 4 INJECTION, SOLUTION INTRAMUSCULAR; INTRAVENOUS at 15:51

## 2022-11-15 RX ADMIN — POLYETHYLENE GLYCOL 3350 17 G: 17 POWDER, FOR SOLUTION ORAL at 08:43

## 2022-11-15 RX ADMIN — CALCIUM 500 MG: 500 TABLET ORAL at 08:44

## 2022-11-15 RX ADMIN — SOTALOL HYDROCHLORIDE 80 MG: 80 TABLET ORAL at 20:28

## 2022-11-15 RX ADMIN — PRAVASTATIN SODIUM 40 MG: 40 TABLET ORAL at 20:29

## 2022-11-15 RX ADMIN — ONDANSETRON 4 MG: 2 INJECTION INTRAMUSCULAR; INTRAVENOUS at 15:51

## 2022-11-15 RX ADMIN — LIDOCAINE HYDROCHLORIDE 60 MG: 20 INJECTION, SOLUTION INTRAVENOUS at 15:43

## 2022-11-15 RX ADMIN — ACYCLOVIR 400 MG: 400 TABLET ORAL at 14:07

## 2022-11-15 RX ADMIN — RIVAROXABAN 20 MG: 20 TABLET, FILM COATED ORAL at 19:15

## 2022-11-15 RX ADMIN — ROCURONIUM BROMIDE 5 MG: 10 INJECTION, SOLUTION INTRAVENOUS at 15:43

## 2022-11-15 RX ADMIN — SODIUM CHLORIDE: 4.5 INJECTION, SOLUTION INTRAVENOUS at 00:03

## 2022-11-15 RX ADMIN — MONTELUKAST SODIUM 10 MG: 10 TABLET ORAL at 20:28

## 2022-11-15 RX ADMIN — VALSARTAN 320 MG: 320 TABLET, FILM COATED ORAL at 08:44

## 2022-11-15 RX ADMIN — ACYCLOVIR 400 MG: 400 TABLET ORAL at 08:45

## 2022-11-15 RX ADMIN — FLUTICASONE PROPIONATE 1 PUFF: 110 AEROSOL, METERED RESPIRATORY (INHALATION) at 05:58

## 2022-11-15 RX ADMIN — PROPOFOL 120 MG: 10 INJECTION, EMULSION INTRAVENOUS at 15:43

## 2022-11-15 RX ADMIN — SOTALOL HYDROCHLORIDE 80 MG: 80 TABLET ORAL at 08:44

## 2022-11-15 RX ADMIN — DOCUSATE SODIUM 100 MG: 100 CAPSULE, LIQUID FILLED ORAL at 08:44

## 2022-11-15 RX ADMIN — SUCCINYLCHOLINE CHLORIDE 120 MG: 20 INJECTION, SOLUTION INTRAMUSCULAR; INTRAVENOUS at 15:43

## 2022-11-15 RX ADMIN — FERROUS SULFATE TAB 325 MG (65 MG ELEMENTAL FE) 325 MG: 325 (65 FE) TAB at 08:45

## 2022-11-15 RX ADMIN — ACYCLOVIR 400 MG: 400 TABLET ORAL at 20:28

## 2022-11-15 RX ADMIN — SODIUM CHLORIDE, PRESERVATIVE FREE 10 ML: 5 INJECTION INTRAVENOUS at 20:29

## 2022-11-15 RX ADMIN — FLUTICASONE PROPIONATE 1 PUFF: 110 AEROSOL, METERED RESPIRATORY (INHALATION) at 18:12

## 2022-11-15 ASSESSMENT — PAIN - FUNCTIONAL ASSESSMENT: PAIN_FUNCTIONAL_ASSESSMENT: NONE - DENIES PAIN

## 2022-11-15 ASSESSMENT — PULMONARY FUNCTION TESTS: PIF_VALUE: 0

## 2022-11-15 NOTE — OP NOTE
Bronchoscopy procedure note under general anesthesia    Patient: Daina Baumann  : 1932      Operation: Flexible diagnostic fiberoptic bronchoscopy with fluoroscopy. During procedure following procedures were performed:  Bronchioalveolar lavage: obtained from right lower lobe posterior segment. Bronch washings obtained from right tracheobronchial tree including right upper lobe, right lower lobe and right middle lobe. Cytology brush specimen was obtained from right lower lobe posterior segment under fluroscopy guidance. Microbiology brush specimen was obtained from right lower lobe posterior segment under fluroscopy guidence. Date of Operation: 11/15/2022    Indication for procedure: Hemoptysis with abnormal CT scan of chest dated 2022. Uncertain etiology. Diagnostic bronchoscopy was planned to locate the source of hemoptysis and to obtain a lower respiratory tract specimen to aid in patient management. Pre operative diagnoses:   -Hemoptysis- ? Etiology  -Right lower lobe cavitary lesion- ? Etiology  -Chronic cough  -Moderate persistent bronchial asthma  -Recent evidence of COVID infection  -Paroxysmal atrial fibrillation status post ablation in 2016 on anticoagulation  -History of left bundle branch block  -Hx bronchial asthma on ICS and LIBRADO per PCP    Post operative diagnoses:   -No active hemoptysis noted. -Inflamed erythematous endobronchial mucosa noted in the right lower lobe and right middle lobe. -Right lower lobe cavitary lesion- ? Etiology  -Chronic cough  -Moderate persistent bronchial asthma  -Recent evidence of COVID infection  -Paroxysmal atrial fibrillation status post ablation in 2016 on anticoagulation  -History of left bundle branch block  -Hx bronchial asthma on ICS and LIBRADO per PCP    Surgeon: Haley Bautista MD    Consent: Daina Baumann is a 80 y.o. female .  The risks, benefits, complications, treatment options and expected outcomes were discussed with the patient. Consent obtained from the patient after explaining the risks and complications of the procedure. The possibilities of reaction to medication, pulmonary aspiration, perforation of a viscus, development of pneumothorax with requirement of chest tube placement,air way bleeding, failure to diagnose a condition and creating a complication leading to respiratory failure requiring mechanical ventilation, transfusion or operation were discussed with the patient who freely signed the consent. The patient was counseled at length about the risks of felipa Covid-19 during their perioperative period and any recovery window from their procedure. The patient was made aware that felipa Covid-19  may worsen their prognosis for recovering from their procedure  and lend to a higher morbidity and/or mortality risk. All material risks, benefits, and reasonable alternatives including postponing the procedure were discussed. The patient does wish to proceed with the procedure at this time. Anesthesia: Patient was given general endotracheal anesthesia by CRNA Ms. Alayna Clement from anesthesiology dept. Please see anesthesiologist's note for details. Description of Procedure: The patient was taken to endoscopy suite, identified as [facility] and the procedure verified as Flexible Fiberoptic Bronchoscopy. A Time Out was held and the above information confirmed. After the induction of general  anesthesia by the anesthesiologist, the patient was placed in appropriate position and the Flexible Fibrooptic bronchoscope was advanced through the endotracheal tube after placing the Swivel adopter. The lower end of endotracheal tube was found to be in appropriate position. The scope was advanced into the trachea. The gabriel is sharp with no growths. Careful inspection of the tracheal lumen below the lower end of endotracheal tube was accomplished and found to have no growths.     The scope was  advanced into the right main bronchus and then into the Right upper lobe, Right middle lobe, and Right lower lobe bronchi and segmental bronchi. The scope was sequentially passed into the Left main and then Left upper and lower bronchi and segmental bronchi. Bronchioalveolar lavage: Bronchioalveolar lavage was performed from right lower lobe posterior segment. A good amount of cloudy bronchioalveolar lavage I.e 25ml was obtained after instilling 120ml of sterile 0.9NS. Bronchioalveolar lavage was sent to the laboratory for further analysis. Bronchial washings: Bronchial washings were performed form right tracheobronchial tree including right upper lobe, right middle lobe and right lower lobe segments. More than 25 ml cloudy Bronchial washings were obtained and sent to the laboratory for further analysis. Trans bronchial lung biopsies: I did not see any definitive opacity on the fluoroscopy I.e noted on her CT scan of chest to do transbronchial biopsies. No transbronchial biopsies were taken. Cytology brush specimen: Cytology brush specimen was obtained from right lower lobe posterior segment under fluroscopy guidence. The Cytology brush specimen was sent to the laboratory for further analysis. Microbiology brush specimen: Microbiology brush specimen was obtained from right lower lobe posterior segment under fluroscopy guidence. The Microbiology brush specimen was sent to the laboratory for further analysis. Endobronchial findings:   Trachea: Normal mucosa. Rachell: Normal mucosa  Right main bronchus: Normal mucosa  Right upper lobe bronchus: Normal mucosa  Right Middle lobe bronchus: Right middle lobe bronchus got narrowed giving fishmouth appearance. Erythematous and inflamed mucosa noted  Right Lower lobe bronchus: Erythematous and inflamed mucosa noted. The right lower lobe posterior segment bronchus was filled with thick mucus. The mucus was removed.   Left main bronchus: Normal mucosa  Left upper lobe bronchus: Normal mucosa  Left lower lobe bronchus: Normal mucosa    No endobronchial mass lesions noted    The Patient was taken to the endoscopy recovery area in satisfactory condition. Estimated Blood Loss: Less than 5ml. Complications: None. Recommendation/Plan:  1. F/U on culturs results  2. F/U on cytology results  3. Follow cytology and microbiology brushes. Follow up: She will be followed as inpatient    Attestation: I performed the procedure.     Sherryle Rued, MD    Electronically signed by Sherryle Rued, MD on 11/15/2022 at 4:25 PM

## 2022-11-15 NOTE — PROGRESS NOTES
Patient in endo for bronchoscopy. Scope  used. Pictures taken. Washings and BAL completed. Brushings completed. Specimens labeled and sent to lab. No  biopsies taken. 4 jars labeled and sent to lab. Fluro used. Procedure completed. Patient tolerated well.

## 2022-11-15 NOTE — PROGRESS NOTES
Hospitalist Progress Note    Patient:  Sangeeta Farr      Unit/Bed:5K-11/011-A    YOB: 1932    MRN: [id number]       Acct: [de-identified]     PCP: Lenard Pemberton MD    Date of Admission: 11/10/2022    Assessment/Plan:    Chronic cough with intermittent hemoptysis:  Concern for TB vs PNA. 10/28 CT chest shows white patchy right middle lobe and right lower lobe airspace opacities some of which are nodular in morphology. There is a cavitary 2.0 x 2.8 cm noncalcified right lower lobe pulmonary nodule as well as additional noncavitary noncalcified nodular densities at the right lower lobe they are associated with interstitial prominence and bronchial wall thickening. Likely infectious in etiology. 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 Never treated for TB. History of recent COVID PNA infection. AFB with culture x3 sent, pending results. Pulm following. Plan for diagnostic bronchoscopy today at 3 PM. Airborne precautions in negative pressure room. SLP seen and recs for regular thin liquid diet, no further speech therapy services indicated. .     Constipation (resolved):  Miralax daily, Colace BID. PRN Dulcolax PO. Paroxysmal Atrial Fibrillation, rate controlled:  S/P Ablation 2016. [id number] Score 4, HAS-BLED Score 2. 11/10 EKG NSR. On Xarelto at home, on hold due to plan for bronchoscopy today. Bridging with Heparin drip. Unfractionated heparin every 24 hours. Primary HTN:  BP stable. Continue satalol and valsartan with hold parameters. Normocytic anemia:  Hbg stable at 9.9. Hyperlipidemia:   Continue pravastatin. Recurrent shingles:  No current rash. Continue acyclovir. GERD:  Continue pantoprazole. Asthma:   No acute exacerbation. Continue montelukast, fluticasone. History of LBBB:  11/10 EKG NSR    Dispo: bronch today at 1500.  Restart [address] and Home when ok with pulm      Chief Complaint: hemoptysis, chronic cough    Hospital Course: The patient is a 80 y.o. female who presents with complaints of chronic cough and hemoptysis. Patient reports that she has had a chronic cough ongoing for greater than than 18 months. She states due to her primary doctor is retiring as well as COVID she has been unable to have good follow-up on this problem. She also reports intermittent hemoptysis although it is mild and not frequent. Patient had referral to pulmonology today and was seen in the office of Dr. [place]. Patient diagnosed with COVID 19 at the end of October and had a CT chest done at that time which showed a lesion in the right lower lobe. Patient does report that this lesion has been seen on previous CT scans although those records are not available in the chart currently. 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 She has never underwent treatment for latent TB infection and chest x-ray previously were negative. Dr. Rustam Dawkins for diagnostic bronchoscopy to further evaluate right lower lobe cavity pneumonia and hemoptysis. Concerned due to cavitary lesion for possible tuberculosis along with hemoptysis. Patient denies any shortness of breath, chest pain, lightheadedness or dizziness, nausea/vomiting, or abdominal pain. She denies any fever or feeling unwell. Subjective: Sitting up in chair. Denies any pain. Denies headache, dizziness, or lightheadedness. Denies chest pain or SOB. Denies abdominal pain or nausea. Denies dysuria or hematuria.          Medications:  Reviewed    Infusion Medications    sodium chloride 20 mL/hr at 11/15/22 0003    sodium chloride      heparin (PORCINE) Infusion 17.5627 Units/kg/hr (11/14/22 1769)     Scheduled Medications    EPINEPHrine  1 mg Endotracheal Once    lidocaine  10 mL Tracheal Tube Once    polyethylene glycol  17 g Oral Daily    docusate sodium  100 mg Oral BID    calcium elemental  500 mg Oral Daily    ferrous sulfate  325 mg Oral BID    montelukast  10 mg Oral Nightly    pantoprazole  40 mg Oral Daily    pravastatin  40 mg Oral Nightly    sotalol  80 mg Oral BID    acyclovir  400 mg Oral TID    valsartan  320 mg Oral Daily    sodium chloride flush  5-40 mL IntraVENous 2 times per day    fluticasone  1 puff Inhalation BID     PRN Meds: bisacodyl, sodium chloride flush, sodium chloride, heparin (porcine), heparin (porcine)      Intake/Output Summary (Last 24 hours) at 11/15/2022 1055  Last data filed at 11/14/2022 1709  Gross per 24 hour   Intake 1106.29 ml   Output --   Net 1106.29 ml         Diet:  Diet NPO Exceptions are: Sips of Water with Meds    Exam:  BP (!) 123/59   Pulse 64   Temp 98.1 °F (36.7 °C) (Oral)   Resp 18   Ht 5' 3\" (1.6 m)   Wt 125 lb 3.5 oz (56.8 kg)   SpO2 94%   BMI 22.18 kg/m²     General appearance: No apparent distress, appears stated age and cooperative. HEENT: Pupils equal, round, and reactive to light. Conjunctivae/corneas clear. Neck: Supple, with full range of motion. No jugular venous distention. Trachea midline. Respiratory:  Normal respiratory effort. Clear to auscultation, bilaterally without Rales/Wheezes/Rhonchi. Chronic cough with white/grey sputum. Cardiovascular: Regular rate and rhythm with normal S1/S2 without murmurs, rubs or gallops. Abdomen: Soft, non-tender, non-distended with normal bowel sounds. Musculoskeletal: passive and active ROM x 4 extremities. Skin: Skin color, texture, turgor normal.  No rashes or lesions. Neurologic:  Neurovascularly intact without any focal sensory/motor deficits. Psychiatric: Alert and oriented, thought content appropriate, normal insight.   Capillary Refill: Brisk,< 3 seconds   Peripheral Pulses: +2 palpable, equal bilaterally       Labs:   Recent Labs     11/14/22  0632   WBC 7.2   HGB 9.7*   HCT 30.4*          Recent Labs     11/13/22  0730 11/14/22  2350    137   K 4.3 4.3    101   CO2 26 26   BUN 20 15 CREATININE 0.6 0.8   CALCIUM 9.0 8.3*       No results for input(s): AST, ALT, BILIDIR, BILITOT, ALKPHOS in the last 72 hours. No results for input(s): INR in the last 72 hours. No results for input(s): Nanci Coombes in the last 72 hours.     Urinalysis:    No results found for: Prudence Gómez [address], RBCUA, BLOODU, [address], Prud[address]les [address]    Radiology:  No orders to display       Diet: Diet NPO Exceptions are: Sips of Water with Meds    DVT prophylaxis: [] Lovenox                                 [] SCDs                                 [] SQ Heparin                                 [] Encourage ambulation           [x] Already on Anticoagulation     Disposition:    [x] Home       [] TCU       [] Rehab       [] Psych       [] SNF       [] [place]       [] Other-    Code Status: Full Code      Electronically signed by Jewels BUSTAMANTEN, RN, AG-ACNP Student on 11/15/2022 at 10:55 AM     SUZANNE Syed - CNP 11/15/2022 6953

## 2022-11-15 NOTE — PLAN OF CARE
Problem: Respiratory - Adult  Goal: Clear lung sounds  11/15/2022 0601 by Wilbur Ward RCP  Outcome: Progressing  Note: Mdi to maintain open airways. Patient mutually agreed on goals.

## 2022-11-15 NOTE — PLAN OF CARE
Problem: Respiratory - Adult  Goal: Clear lung sounds  11/15/2022 1818 by Aurelio Brice RCP  Outcome: Progressing  Continue inhaler to improve aeration of lungs. Patient mutually agreed on goals.

## 2022-11-15 NOTE — PROGRESS NOTES
Patient - Paul Devine,  Age - 80 y.o.    - 1932      Room Number - 5K-11/011-A   Consulting - Fish Mcfarlane, * Primary Care Physician - Ramona Resendez MD   MRN -  [id number]   Acct # - [de-identified]  Date of Admission -  11/10/2022  8:38 PM  Hospital Day - 5    Reason for consult   Chronic cough with intermittent hemoptysis  HPI   Paul Devine is a 80 y.o. female with PMHx of GERD, recurrent shingles, HLD, HTN, paroxysmal A. fib, Hx of LBBB, asthma who presents to [facility] on 11/10 for symptoms of chronic cough and hemoptysis. Patient has had this cough for minimum of 18 months. She was recently diagnosed with COVID on 10/29/2022 and was hospitalized at [facility], treated with Decadron 6 mg, antibiotic course, and baricitinib. Patient also reports of intermittent hemoptysis which is mild and infrequent. Was seen in the office of  on 11/10 and was transferred to inpatient services. Patient has been treated with several courses of antibiotic by PCP for cough with minimal improvement. 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 Previous chest x-rays were negative. CXR done at [facility] on 10/28 shows slight improvement in the right perihilar infiltrate residual infiltrates persist.  CTA chest on 10/28 showed patchy right middle lobe and right lower lobe airspace opacities some of which are nodular in morphology. There are trace pleural effusions. There is a cavitary 2.0 x 2.8 cm noncalcified right lower lobe pulmonary nodule as well as additional noncavitary noncalcified nodular densities in the right lower lobe that are associated with interstitial prominence and bronchial wall thickening. Findings are likely infectious in etiology. Sequela of old granulomatous disease. Subcentimeter short axis dimension mediastinal and right hilar lymph nodes.     Past 24 hours, ROS   -On RA  -Denies any episodes of hemoptysis past 24 hours  -Afebrile denies night sweats  All other systems reviewed  Objective    Vitals    height is 5' 3\" (1.6 m) and weight is 125 lb 3.5 oz (56.8 kg). Her oral temperature is 98.1 °F (36.7 °C). Her blood pressure is 123/59 (abnormal) and her pulse is 64. Her respiration is 18 and oxygen saturation is 94%. I/O    Intake/Output Summary (Last 24 hours) at 11/15/2022 1406  Last data filed at 11/14/2022 1709  Gross per 24 hour   Intake 866.29 ml   Output --   Net 866.29 ml       Patient Vitals for the past 96 hrs (Last 3 readings):   Weight   11/15/22 0530 125 lb 3.5 oz (56.8 kg)       Exam    Physical Exam   Constitutional: No distress on RA. Patient appears moderately built moderately nourished. Head: Normocephalic and atraumatic. Mouth/Throat: Oropharynx is clear and moist.  No oral thrush. Eyes: Conjunctivae are normal. Pupils are equal, round. No scleral icterus. Neck: Neck supple. No tracheal deviation present. Cardiovascular: S1 and S2 with no murmur. No peripheral edema. Pulmonary/Chest: Normal effort with bilateral air entry, clear breath sounds intermittent rhonchorous cough. No stridor. No respiratory distress. Patient exhibits no tenderness. Abdominal: Soft. Bowel sounds audible. No distension or tenderness to palp. Musculoskeletal: Moves all extremities  Neurological: Patient is alert and follows simple commands.      Meds       EPINEPHrine  1 mg Endotracheal Once    lidocaine  10 mL Tracheal Tube Once    polyethylene glycol  17 g Oral Daily    docusate sodium  100 mg Oral BID    calcium elemental  500 mg Oral Daily    ferrous sulfate  325 mg Oral BID    montelukast  10 mg Oral Nightly    pantoprazole  40 mg Oral Daily    pravastatin  40 mg Oral Nightly    sotalol  80 mg Oral BID    acyclovir  400 mg Oral TID    valsartan  320 mg Oral Daily    sodium chloride flush  5-40 mL IntraVENous 2 times per day    fluticasone  1 puff Inhalation BID sodium chloride 20 mL/hr at 11/15/22 0003    sodium chloride      heparin (PORCINE) Infusion 17.5627 Units/kg/hr (11/14/22 1709)     bisacodyl, sodium chloride flush, sodium chloride, heparin (porcine), heparin (porcine)  Labs   ABG  No results found for: PH, PO2, PCO2, HCO3, O2SAT  No results found for: IFIO2, MODE, SETTIDVOL, SETPEEP  CBC  Recent Labs     11/14/22  0632   WBC 7.2   RBC 3.40*   HGB 9.7*   HCT 30.4*   MCV 89.4   MCH 28.5   MCHC 31.9*      MPV 9.3*        BMP  Recent Labs     11/13/22  0730 11/14/22  2350    137   K 4.3 4.3    101   CO2 26 26   BUN 20 15   CREATININE 0.6 0.8   GLUCOSE 113* 95   CALCIUM 9.0 8.3*       LFT  No results for input(s): AST, ALT, ALB, BILITOT, ALKPHOS, LIPASE in the last 72 hours. Invalid input(s): AMYLASE    TROP  No results found for: TROPONINT  BNP  No results found for: PROBNP  D-Dimer  No results found for: DDIMER  Lactic Acid  No results for input(s): LACTA in the last 72 hours. INR  No results for input(s): INR, PROTIME in the last 72 hours. PTT  Recent Labs     11/12/22  1611   APTT 68.6*       Glucose  No results for input(s): POCGLU in the last 72 hours. UA No results for input(s): SPECGRAV, PHUR, COLORU, CLARITYU, MUCUS, PROTEINU, BLOODU, RBCUA, WBCUA, BACTERIA, NITRU, GLUCOSEU, BILIRUBINUR, UROBILINOGEN, KETUA, LABCAST, LABCASTTY, AMORPHOS in the last 72 hours. Invalid input(s): CRYSTALS. Echo   None in epic  Cultures    Procalcitonin   No results found for: [facility]    Radiology             Assessment    -Hemoptysis- ?  Etiology  -Right lower lobe cavitary lesion  -Chronic cough  -Moderate persistent bronchial asthma  -Recent evidence of COVID infection  -Paroxysmal atrial fibrillation status post ablation in 2016 on anticoagulation  -History of left bundle branch block  -Hx bronchial asthma on ICS and LIBRADO per PCP    Plan   -Scheduled for Bronchoscopy to obtain transbroncial biopsy under fluoroscopy, and BAL on Tuesday 3pm. -Keep Xarelto on hold. -Heparin Drip. Hold started at 0900 AM  -AFB x3 order unable to obtain sample   -Continue home inhalers  -Explained bronchoscopy procedure including risks and benefits patient verbalized understanding risks reviewed included but not limited to infection bleeding and possible pneumothorax requiring placement of chest tube and possible mechanical ventilation she verbalized understanding agreed to risks   -NPO at midnight   -DVT prophylaxis on heparin gtt    Case discussed with nurse and patient/family. Questions and concerns addressed. Meds and Orders reviewed. Electronically signed by   SUZANNE Palomino - CNP on 11/15/2022     Addendum by Dr. Mauri Gutiérrez MD:  Patient seen by me independently including key components of medical care. Face to face evaluation and examination was performed. Case discussed with Mr. Yvonne Hernandez CNP  Italicized font, if present,  represents changes to the note made by me. More than 50% of the encounter time involved with patient care by the Pulmonary & Critical care service team spent by me . Please see my modifications mentioned below:  Patient is scheduled for bronchoscopy today  Continue n.p.o. No hemoptysis  Patient educated about my impression and plan  She was also informed about bronchoscopy procedure and associated complications.   She agreed to go for the procedure    Electronically signed by   Pamela Pate MD on 11/15/2022 at 6:40 PM

## 2022-11-15 NOTE — PRE SEDATION
[address] [id number] Endoscopy  Sedation Pre-Procedure Note    Patient Name: Sylvie Schofield    YOB: 1932   Room/Bed: [address] [place] [address]  Medical Record Number: [id number]  Date: 11/15/2022  Time: 4:38 PM     Indication:  Pain control for Flexible Fibrooptic Bronchoscopy. Consent: I have discussed with the patient and/or the patient representative the indication, alternatives, and the possible risks and/or complications of the planned procedure and the anesthesia methods. The patient and/or patient representative appear to understand and agree to proceed.     Vital Signs: /62   Pulse 67   Temp 98.1 °F (36.7 °C) (Oral)   Resp 18   Ht 5' 3\" (1.6 m)   Wt 125 lb 3.5 oz (56.8 kg)   SpO2 96%   BMI 22.18 kg/m²       Past Medical History:  Past Medical History:   Diagnosis Date    Asthma     Cognitive communication deficit     Left bundle branch block     Pneumonia          Allergy:  Allergies   Allergen Reactions    Accolate [Zafirlukast] Other (See Comments)     unknown    Asa [Aspirin] Other (See Comments)     unknown    Chlordiazepoxide Other (See Comments)     unknown    Ciprofloxacin Other (See Comments)     unknown    Clindamycin/Lincomycin Other (See Comments)     Unknown    Codeine      All codeine    Demerol [Meperidine Hcl] Other (See Comments)     unknown    Diazepam Other (See Comments)     unknown    Diclofenac Sodium Other (See Comments)     unknown    Ethylenediamine      unknown    Evista [Raloxifene] Other (See Comments)     unknown    Gabapentin Other (See Comments)     unknown    Iodine Other (See Comments)     unknown    Metoprolol Other (See Comments)     unknown    Nitrofurantoin      unknown    Oxycodone-Acetaminophen      unknown    Pcn [Penicillins] Other (See Comments)     unknown    Phenergan [Promethazine] Other (See Comments)     unknown    Promethazine Hcl Other (See Comments)     unknown Talwin [Pentazocine] Other (See Comments)     unknown    Triamcinolone Acetonide Other (See Comments)     unknown    Voltaren [Diclofenac]      unknown         Past Surgical History:  History reviewed. No pertinent surgical history.     Medications:   Current Facility-Administered Medications   Medication Dose Route Frequency Provider Last Rate Last Admin    EPINEPHrine 1 MG/10ML injection 1 mg  1 mg Endotracheal Once Christy Gallagher MD        0.45 % sodium chloride infusion   IntraVENous Continuous Joey Dorman MD 20 mL/hr at 11/15/22 0003 Restarted at 11/15/22 1527    lidocaine 1 % injection 10 mL  10 mL Tracheal Tube Once Joey Dorman MD        bisacodyl (DULCOLAX) EC tablet 10 mg  10 mg Oral Daily PRN Lady Nelson Davis, APRN - CNP   10 mg at 11/15/22 0843    polyethylene glycol (GLYCOLAX) packet 17 g  17 g Oral Daily Albertlie RAMIN Roechith, APRN - CNP   17 g at 11/15/22 0843    docusate sodium (COLACE) capsule 100 mg  100 mg Oral BID  Rice Jyothichith, APRN - CNP   100 mg at 11/15/22 0844    calcium elemental (OSCAL) tablet 500 mg  500 mg Oral Daily [place] Bees, APRN - CNP   500 mg at 11/15/22 0844    ferrous sulfate (IRON 325) tablet 325 mg  325 mg Oral BID Brett Bees, APRN - CNP   325 mg at 11/15/22 0845    montelukast (SINGULAIR) tablet 10 mg  10 mg Oral Nightly [place] Bees, APRN - CNP   10 mg at 11/14/22 2121    pantoprazole (PROTONIX) tablet 40 mg  40 mg Oral Daily Brett Bees, APRN - CNP   40 mg at 11/14/22 4153    pravastatin (PRAVACHOL) tablet 40 mg  40 mg Oral Nightly [place] Bees, APRN - CNP   40 mg at 11/14/22 2121    sotalol (BETAPACE) tablet 80 mg  80 mg Oral BID [place] Bees, APRN - CNP   80 mg at 11/15/22 0844    acyclovir (ZOVIRAX) tablet 400 mg  400 mg Oral TID Brett Bees, APRN - CNP   400 mg at 11/15/22 1407    valsartan (DIOVAN) tablet 320 mg  320 mg Oral Daily Brett Bees, APRN - CNP   320 mg at 11/15/22 0844    sodium chloride flush 0.9 % injection 5-40 mL  5-40 mL IntraVENous 2 times per day Luna Bussing, APRN - CNP   10 mL at 11/15/22 0843    sodium chloride flush 0.9 % injection 5-40 mL  5-40 mL IntraVENous PRN Luna Bussing, APRN - CNP        0.9 % sodium chloride infusion   IntraVENous PRN Luna Bussing, APRN - CNP        heparin (porcine) injection 3,350 Units  60 Units/kg IntraVENous PRN Luna Bussing, APRN - CNP        heparin (porcine) injection 1,670 Units  30 Units/kg IntraVENous PRN Luna Bussing, APRN - CNP        heparin 25,000 units in dextrose 5% 250 mL (premix) infusion  5-30 Units/kg/hr IntraVENous Continuous Luna Bussing, APRN - CNP   Stopped at 11/15/22 0850    fluticasone (FLOVENT HFA) 110 MCG/ACT inhaler 1 puff  1 puff Inhalation BID Luna Bussing, APRN - CNP   1 puff at 11/15/22 8450     Facility-Administered Medications Ordered in Other Encounters   Medication Dose Route Frequency Provider Last Rate Last Admin    propofol injection   IntraVENous PRN Neris Lock, APRN - CRNA   120 mg at 11/15/22 1543    succinylcholine (ANECTINE) injection   IntraVENous PRN Neris Lock, APRN - CRNA   120 mg at 11/15/22 1543    rocuronium (ZEMURON) injection   IntraVENous PRN Neris Lock, APRN - CRNA   5 mg at 11/15/22 1543    lidocaine (cardiac) (XYLOCAINE) injection   IntraVENous PRN Neris Lock, APRN - CRNA   60 mg at 11/15/22 1543    Dexamethasone Sodium Phosphate injection   IntraVENous PRN Neris Lock, APRN - CRNA   10 mg at 11/15/22 1551    ondansetron (ZOFRAN) injection   IntraVENous PRN Neris Lock, APRN - CRNA   4 mg at 11/15/22 1551    phenylephrine (TERESA-SYNEPHRINE) injection   IntraVENous PRN Neris Lock, APRN - CRNA   200 mcg at 11/15/22 1608         Coumadin Use Last 7 Days:  no  Antiplatelet drug therapy use last 7 days: no  Other anticoagulant use last 7 days: yes -she used to be on treatment with Eliquis. It was not on hold for more than 3 days. She is on heparin drip.  It was on hold since morning. Pre-Sedation Documentation and Exam:   I have personally completed a history, physical exam & review of systems for this patient (see notes). Mallampati Airway Assessment:  Please see CRNA note for details. Prior History of Anesthesia Complications:   Please see CRNA note for details. ASA Classification:  Please see CRNA note for details. Sedation/ Anesthesia Plan:   Patient was given anesthesia by CRNA MsJoss Nixon from anesthesiology dept. Please see detailed note by CRNA for details.     Katja Huff MD MD, [facility]  11/15/2022, 4:38 PM    Electronically signed by Katja Huff MD on 11/15/2022 at 4:38 PM

## 2022-11-15 NOTE — ANESTHESIA PRE PROCEDURE
Department of Anesthesiology  Preprocedure Note       Name:  Nishi Skinner   Age:  80 y.o.  :  1932                                          MRN:  [id number]         Date:  11/15/2022      Surgeon: Munir Antoine):  Beau Herrera MD    Procedure: Procedure(s):  BRONCHOSCOPY, BIOPSY, FLUROSCOPY    Medications prior to admission:   Prior to Admission medications    Medication Sig Start Date End Date Taking?  Authorizing Provider   valsartan (DIOVAN) 320 MG tablet Take 320 mg by mouth daily   Yes Historical Provider, MD   sotalol (BETAPACE) 80 MG tablet Take 80 mg by mouth 2 times daily    Historical Provider, MD   valACYclovir (VALTREX) 500 MG tablet Take 500 mg by mouth daily    Historical Provider, MD   vitamin B-1 (THIAMINE) 100 MG tablet Take 100 mg by mouth daily  Patient not taking: Reported on 11/10/2022    Historical Provider, MD   rivaroxaban (XARELTO) 20 MG TABS tablet Take 20 mg by mouth    Historical Provider, MD   zinc gluconate 50 MG tablet Take 50 mg by mouth daily  Patient not taking: Reported on 11/10/2022    Historical Provider, MD   vitamin C (ASCORBIC ACID) 500 MG tablet Take 500 mg by mouth daily  Patient not taking: Reported on 11/10/2022    Historical Provider, MD   calcium carbonate 600 MG TABS tablet Take 1 tablet by mouth daily    Historical Provider, MD   Cholecalciferol (VITAMIN D3) 125 MCG (5000 UT) TABS Take by mouth    Historical Provider, MD   Dextromethorphan HBr (DELSYM PO) Take by mouth  Patient not taking: Reported on 11/10/2022    Historical Provider, MD   dextromethorphan (DELSYM) 30 MG/5ML extended release liquid Take 60 mg by mouth 2 times daily as needed for Cough  Patient not taking: Reported on 11/10/2022    Historical Provider, MD   ferrous sulfate (IRON 325) 325 (65 Fe) MG tablet Take 325 mg by mouth in the morning and at bedtime    Historical Provider, MD   hydroCHLOROthiazide (MICROZIDE) 12.5 MG capsule Take 12.5 mg by mouth daily  Patient not taking: Reported on 11/10/2022    Historical Provider, MD   lisinopril (PRINIVIL;ZESTRIL) 40 MG tablet Take 40 mg by mouth daily  Patient not taking: Reported on 11/10/2022    Historical Provider, MD   meclizine (ANTIVERT) 25 MG tablet Take 25 mg by mouth 3 times daily as needed  Patient not taking: Reported on 11/10/2022    Historical Provider, MD   montelukast (SINGULAIR) 10 MG tablet Take 10 mg by mouth nightly    Historical Provider, MD   nitroGLYCERIN (NITROSTAT) 0.4 MG SL tablet Place 0.4 mg under the tongue every 5 minutes as needed for Chest pain up to max of 3 total doses. If no relief after 1 dose, call 911.     Historical Provider, MD   polyethylene glycol (GLYCOLAX) 17 g packet Take 17 g by mouth daily as needed for Constipation    Historical Provider, MD   potassium chloride (KLOR-CON) 20 MEQ packet Take 20 mEq by mouth 2 times daily  Patient not taking: Reported on 11/10/2022    Historical Provider, MD   pravastatin (PRAVACHOL) 40 MG tablet Take 40 mg by mouth daily    Historical Provider, MD   Multiple Vitamins-Minerals (PRESERVISION AREDS 2 PO) Take by mouth    Historical Provider, MD   pantoprazole (PROTONIX) 40 MG tablet Take 40 mg by mouth daily    Historical Provider, MD   beclomethasone (QVAR REDIHALER) 80 MCG/ACT AERB inhaler Inhale 1 puff into the lungs 2 times daily    Historical Provider, MD   senna-docusate (Kecia Stands) 8.6-50 MG per tablet Take 1 tablet by mouth daily  Patient not taking: Reported on 11/10/2022    Historical Provider, MD       Current medications:    Current Facility-Administered Medications   Medication Dose Route Frequency Provider Last Rate Last Admin    EPINEPHrine 1 MG/10ML injection 1 mg  1 mg Endotracheal Once Joey Dorman MD        0.45 % sodium chloride infusion   IntraVENous Continuous Joey Dorman MD 20 mL/hr at 11/15/22 0003 New Bag at 11/15/22 0003    lidocaine 1 % injection 10 mL  10 mL Tracheal Tube Once MD Pola Schuster bisacodyl (DULCOLAX) EC tablet 10 mg  10 mg Oral Daily PRN Humphrey Leeann, APRN - CNP   10 mg at 11/15/22 0843    polyethylene glycol (GLYCOLAX) packet 17 g  17 g Oral Daily [facility], APRN - CNP   17 g at 11/15/22 0843    docusate sodium (COLACE) capsule 100 mg  100 mg Oral BID [facility], APRN - CNP   100 mg at 11/15/22 0844    calcium elemental (OSCAL) tablet 500 mg  500 mg Oral Daily [facility], APRN - CNP   500 mg at 11/15/22 0844    ferrous sulfate (IRON 325) tablet 325 mg  325 mg Oral BID [facility], APRN - CNP   325 mg at 11/15/22 0845    montelukast (SINGULAIR) tablet 10 mg  10 mg Oral Nightly [facility], APRN - CNP   10 mg at 11/14/22 2121    pantoprazole (PROTONIX) tablet 40 mg  40 mg Oral Daily [facility], APRN - CNP   40 mg at 11/14/22 0456    pravastatin (PRAVACHOL) tablet 40 mg  40 mg Oral Nightly [facility], APRN - CNP   40 mg at 11/14/22 2121    sotalol (BETAPACE) tablet 80 mg  80 mg Oral BID [facility], APRN - CNP   80 mg at 11/15/22 0844    acyclovir (ZOVIRAX) tablet 400 mg  400 mg Oral TID [facility], APRN - CNP   400 mg at 11/15/22 1407    valsartan (DIOVAN) tablet 320 mg  320 mg Oral Daily [facility], APRN - CNP   320 mg at 11/15/22 0844    sodium chloride flush 0.9 % injection 5-40 mL  5-40 mL IntraVENous 2 times per day [facility], APRN - CNP   10 mL at 11/15/22 0843    sodium chloride flush 0.9 % injection 5-40 mL  5-40 mL IntraVENous PRN Susy Etlan, SUZANNE - CNP        0.9 % sodium chloride infusion   IntraVENous PRN SUZANNE Billings CNP        heparin (porcine) injection 3,350 Units  60 Units/kg IntraVENous PRN Susy Etlan, SUZANNE - CNP        heparin (porcine) injection 1,670 Units  30 Units/kg IntraVENous PRN Susy EtlanSUZANNE - CNP        heparin 25,000 units in dextrose 5% 250 mL (premix) infusion  5-30 Units/kg/hr IntraVENous Continuous SUZANNE Billings CNP   Stopped at 11/15/22 4013    fluticasone (FLOVENT HFA) 110 MCG/ACT inhaler 1 puff  1 puff Inhalation BID Choco Ledezma, APRN - CNP   1 puff at 11/15/22 0558       Allergies: Allergies   Allergen Reactions    Accolate [Zafirlukast] Other (See Comments)     unknown    Asa [Aspirin] Other (See Comments)     unknown    Chlordiazepoxide Other (See Comments)     unknown    Ciprofloxacin Other (See Comments)     unknown    Clindamycin/Lincomycin Other (See Comments)     Unknown    Codeine      All codeine    Demerol [Meperidine Hcl] Other (See Comments)     unknown    Diazepam Other (See Comments)     unknown    Diclofenac Sodium Other (See Comments)     unknown    Ethylenediamine      unknown    Evista [Raloxifene] Other (See Comments)     unknown    Gabapentin Other (See Comments)     unknown    Iodine Other (See Comments)     unknown    Metoprolol Other (See Comments)     unknown    Nitrofurantoin      unknown    Oxycodone-Acetaminophen      unknown    Pcn [Penicillins] Other (See Comments)     unknown    Phenergan [Promethazine] Other (See Comments)     unknown    Promethazine Hcl Other (See Comments)     unknown    Talwin [Pentazocine] Other (See Comments)     unknown    Triamcinolone Acetonide Other (See Comments)     unknown    Voltaren [Diclofenac]      unknown       Problem List:    Patient Active Problem List   Diagnosis Code    Hemoptysis R04.2    Paroxysmal atrial fibrillation (HCC) I48.0    Cavitary lesion of lung J98.4    Primary hypertension I10    Chronic a-fib (HCC) I48.20    Abnormal CT of the chest R93.89    Moderate persistent asthma without complication L89.81       Past Medical History:        Diagnosis Date    Asthma     Cognitive communication deficit     Left bundle branch block     Pneumonia        Past Surgical History:  History reviewed. No pertinent surgical history.     Social History:    Social History     Tobacco Use    Smoking status: Never    Smokeless tobacco: Never   Substance Use Topics    Alcohol use: Never                                Counseling given: Not Answered      Vital Signs (Current):   Vitals:    11/15/22 0345 11/15/22 0530 11/15/22 0830 11/15/22 1426   BP: 131/60  (!) 123/59 136/62   Pulse: 69  64 67   Resp: 16  18 18   Temp: 36.6 °C (97.9 °F)  36.7 °C (98.1 °F)    TempSrc: Oral  Oral    SpO2: 96%  94% 96%   Weight:  125 lb 3.5 oz (56.8 kg)     Height:                                                  BP Readings from Last 3 Encounters:   11/15/22 136/62   11/10/22 118/70       NPO Status: Time of last liquid consumption: 0900 (sips water with meds)                        Time of last solid consumption: 1730                        Date of last liquid consumption: 11/15/22                        Date of last solid food consumption: 11/14/22    BMI:   Wt Readings from Last 3 Encounters:   11/15/22 125 lb 3.5 oz (56.8 kg)   11/10/22 123 lb (55.8 kg)     Body mass index is 22.18 kg/m². CBC:   Lab Results   Component Value Date/Time    WBC 7.2 11/14/2022 06:32 AM    RBC 3.40 11/14/2022 06:32 AM    HGB 9.7 11/14/2022 06:32 AM    HCT 30.4 11/14/2022 06:32 AM    MCV 89.4 11/14/2022 06:32 AM     11/14/2022 06:32 AM       CMP:   Lab Results   Component Value Date/Time     11/14/2022 11:50 PM    K 4.3 11/14/2022 11:50 PM    K 4.3 11/11/2022 04:07 AM     11/14/2022 11:50 PM    CO2 26 11/14/2022 11:50 PM    BUN 15 11/14/2022 11:50 PM    CREATININE 0.8 11/14/2022 11:50 PM    LABGLOM >60 11/14/2022 07:45 PM    GLUCOSE 95 11/14/2022 11:50 PM    PROT 5.5 11/10/2022 09:51 PM    CALCIUM 8.3 11/14/2022 11:50 PM    BILITOT 0.4 11/10/2022 09:51 PM    ALKPHOS 63 11/10/2022 09:51 PM    AST 19 11/10/2022 09:51 PM    ALT 18 11/10/2022 09:51 PM       POC Tests: No results for input(s): POCGLU, POCNA, POCK, POCCL, POCBUN, POCHEMO, POCHCT in the last 72 hours.     Coags:   Lab Results   Component Value Date/Time    INR 1.26 11/11/2022 04:08 AM    APTT 68.6 11/12/2022 04:11 PM HCG (If Applicable): No results found for: PREGTESTUR, PREGSERUM, HCG, HCGQUANT     ABGs: No results found for: PHART, PO2ART, BOC2NGO, PVK4XUU, BEART, H2ULLNSY     Type & Screen (If Applicable):  No results found for: LABABO, LABRH    Drug/Infectious Status (If Applicable):  No results found for: HIV, HEPCAB    COVID-19 Screening (If Applicable): No results found for: COVID19        Anesthesia Evaluation  Patient summary reviewed  Airway: Mallampati: II  TM distance: >3 FB   Neck ROM: full  Mouth opening: > = 3 FB   Dental: normal exam         Pulmonary:normal exam    (+) pneumonia: unresolved,  asthma:                            Cardiovascular:    (+) hypertension:,       ECG reviewed                        Neuro/Psych:   Negative Neuro/Psych ROS              GI/Hepatic/Renal: Neg GI/Hepatic/Renal ROS            Endo/Other: Negative Endo/Other ROS                    Abdominal:             Vascular: negative vascular ROS. Other Findings:           Anesthesia Plan      general     ASA 3       Induction: intravenous. MIPS: Prophylactic antiemetics administered. Anesthetic plan and risks discussed with patient and child/children. Plan discussed with CRNA.     Attending anesthesiologist reviewed and agrees with Preprocedure content                SZUANNE Paul - CRNA   11/15/2022

## 2022-11-15 NOTE — PLAN OF CARE
Problem: Safety - Adult  Goal: Free from fall injury  Outcome: Progressing  Flowsheets (Taken 11/15/2022 0305)  Free From Fall Injury: Instruct family/caregiver on patient safety     Problem: ABCDS Injury Assessment  Goal: Absence of physical injury  Outcome: Progressing  Flowsheets (Taken 11/15/2022 0305)  Absence of Physical Injury: Implement safety measures based on patient assessment     Problem: Skin/Tissue Integrity - Adult  Goal: Skin integrity remains intact  Outcome: Progressing  Flowsheets  Taken 11/15/2022 0305  Skin Integrity Remains Intact:   Monitor for areas of redness and/or skin breakdown   Assess vascular access sites hourly  Taken 11/14/2022 2000  Skin Integrity Remains Intact:   Monitor for areas of redness and/or skin breakdown   Assess vascular access sites hourly     Problem: Infection - Adult  Goal: Absence of infection at discharge  Outcome: Progressing  Flowsheets (Taken 11/14/2022 2000)  Absence of infection at discharge:   Assess and monitor for signs and symptoms of infection   Monitor lab/diagnostic results   Monitor all insertion sites i.e., indwelling lines, tubes and drains   Administer medications as ordered   Instruct and encourage patient and family to use good hand hygiene technique   [place] appropriate cooling/warming therapies per order   Identify and instruct in appropriate isolation precautions for identified infection/condition  Goal: Isolation precautions  Description: Isolation precautions- airborne  Outcome: Progressing  Note: Patient in negative pressure room with airborne precaution signs up. All necessary PPE stocked outside of room.        Problem: Discharge Planning  Goal: Discharge to home or other facility with appropriate resources  Outcome: Progressing  Flowsheets (Taken 11/14/2022 2000)  Discharge to home or other facility with appropriate resources:   Identify barriers to discharge with patient and caregiver   Arrange for needed discharge resources and transportation as appropriate   Identify discharge learning needs (meds, wound care, etc)   Refer to discharge planning if patient needs post-hospital services based on physician order or complex needs related to functional status, cognitive ability or social support system     Care plan reviewed with patient. Patient verbalized understanding of the plan of care and contribute to goal setting.

## 2022-11-16 LAB
BAL CHARACTER: NORMAL
BAL COLLECTION SITE: NORMAL
BAL COLOR: NORMAL
ERYTHROCYTE [DISTWIDTH] IN BLOOD BY AUTOMATED COUNT: 14.7 % (ref 11.5–14.5)
ERYTHROCYTE [DISTWIDTH] IN BLOOD BY AUTOMATED COUNT: 47.1 FL (ref 35–45)
HCT VFR BLD CALC: 32.4 % (ref 37–47)
HEMOGLOBIN: 10.5 GM/DL (ref 12–16)
MCH RBC QN AUTO: 29 PG (ref 26–33)
MCHC RBC AUTO-ENTMCNC: 32.4 GM/DL (ref 32.2–35.5)
MCV RBC AUTO: 89.5 FL (ref 81–99)
PATHOLOGIST REVIEW: NORMAL
PLATELET # BLD: 207 THOU/MM3 (ref 130–400)
PMV BLD AUTO: 9.3 FL (ref 9.4–12.4)
RBC # BLD: 3.62 MILL/MM3 (ref 4.2–5.4)
RBC BAL: 2156 /CUMM
TOTAL NUCLEATED CELLS BAL: 3317 /CUMM
TOTAL VOLUME RECEIVED BAL: 30 ML
WBC # BLD: 8.1 THOU/MM3 (ref 4.8–10.8)

## 2022-11-16 PROCEDURE — 6360000002 HC RX W HCPCS: Performed by: INTERNAL MEDICINE

## 2022-11-16 PROCEDURE — 99232 SBSQ HOSP IP/OBS MODERATE 35: CPT | Performed by: INTERNAL MEDICINE

## 2022-11-16 PROCEDURE — 36415 COLL VENOUS BLD VENIPUNCTURE: CPT

## 2022-11-16 PROCEDURE — 6370000000 HC RX 637 (ALT 250 FOR IP): Performed by: PHYSICIAN ASSISTANT

## 2022-11-16 PROCEDURE — 1200000000 HC SEMI PRIVATE

## 2022-11-16 PROCEDURE — 6370000000 HC RX 637 (ALT 250 FOR IP)

## 2022-11-16 PROCEDURE — 94760 N-INVAS EAR/PLS OXIMETRY 1: CPT

## 2022-11-16 PROCEDURE — 85027 COMPLETE CBC AUTOMATED: CPT

## 2022-11-16 PROCEDURE — 2580000003 HC RX 258: Performed by: INTERNAL MEDICINE

## 2022-11-16 PROCEDURE — 2580000003 HC RX 258

## 2022-11-16 PROCEDURE — 94640 AIRWAY INHALATION TREATMENT: CPT

## 2022-11-16 PROCEDURE — 99232 SBSQ HOSP IP/OBS MODERATE 35: CPT | Performed by: PHYSICIAN ASSISTANT

## 2022-11-16 PROCEDURE — 6370000000 HC RX 637 (ALT 250 FOR IP): Performed by: NURSE PRACTITIONER

## 2022-11-16 RX ORDER — TOBRAMYCIN INHALATION SOLUTION 300 MG/5ML
300 INHALANT RESPIRATORY (INHALATION) 2 TIMES DAILY
Status: DISCONTINUED | OUTPATIENT
Start: 2022-11-16 | End: 2022-11-20 | Stop reason: HOSPADM

## 2022-11-16 RX ADMIN — MEROPENEM 500 MG: 500 INJECTION, POWDER, FOR SOLUTION INTRAVENOUS at 18:43

## 2022-11-16 RX ADMIN — SOTALOL HYDROCHLORIDE 80 MG: 80 TABLET ORAL at 21:45

## 2022-11-16 RX ADMIN — Medication 300 MG: at 17:33

## 2022-11-16 RX ADMIN — DOCUSATE SODIUM 100 MG: 100 CAPSULE, LIQUID FILLED ORAL at 21:51

## 2022-11-16 RX ADMIN — PANTOPRAZOLE SODIUM 40 MG: 40 TABLET, DELAYED RELEASE ORAL at 06:20

## 2022-11-16 RX ADMIN — RIVAROXABAN 15 MG: 20 TABLET, FILM COATED ORAL at 21:44

## 2022-11-16 RX ADMIN — FLUTICASONE PROPIONATE 1 PUFF: 110 AEROSOL, METERED RESPIRATORY (INHALATION) at 08:27

## 2022-11-16 RX ADMIN — SOTALOL HYDROCHLORIDE 80 MG: 80 TABLET ORAL at 07:51

## 2022-11-16 RX ADMIN — MONTELUKAST SODIUM 10 MG: 10 TABLET ORAL at 21:45

## 2022-11-16 RX ADMIN — ACYCLOVIR 400 MG: 400 TABLET ORAL at 07:49

## 2022-11-16 RX ADMIN — FERROUS SULFATE TAB 325 MG (65 MG ELEMENTAL FE) 325 MG: 325 (65 FE) TAB at 07:49

## 2022-11-16 RX ADMIN — ACYCLOVIR 400 MG: 400 TABLET ORAL at 14:08

## 2022-11-16 RX ADMIN — ACYCLOVIR 400 MG: 400 TABLET ORAL at 21:46

## 2022-11-16 RX ADMIN — SODIUM CHLORIDE, PRESERVATIVE FREE 10 ML: 5 INJECTION INTRAVENOUS at 11:01

## 2022-11-16 RX ADMIN — PRAVASTATIN SODIUM 40 MG: 40 TABLET ORAL at 21:46

## 2022-11-16 RX ADMIN — DOCUSATE SODIUM 100 MG: 100 CAPSULE, LIQUID FILLED ORAL at 09:18

## 2022-11-16 RX ADMIN — CALCIUM 500 MG: 500 TABLET ORAL at 07:49

## 2022-11-16 RX ADMIN — MEROPENEM 500 MG: 500 INJECTION, POWDER, FOR SOLUTION INTRAVENOUS at 11:01

## 2022-11-16 RX ADMIN — VALSARTAN 320 MG: 320 TABLET, FILM COATED ORAL at 07:51

## 2022-11-16 RX ADMIN — FLUTICASONE PROPIONATE 1 PUFF: 110 AEROSOL, METERED RESPIRATORY (INHALATION) at 17:34

## 2022-11-16 RX ADMIN — FERROUS SULFATE TAB 325 MG (65 MG ELEMENTAL FE) 325 MG: 325 (65 FE) TAB at 18:22

## 2022-11-16 NOTE — PLAN OF CARE
Alert and oriented x4. No barriers noted to learning about new medications. Able to verbalize 100% of education of safety and current POC.  Cooperative with care and assessment this date

## 2022-11-16 NOTE — PLAN OF CARE
Problem: Respiratory - Adult  Goal: Clear lung sounds  11/16/2022 0834 by Rohan Jurado RCP  Outcome: Progressing

## 2022-11-16 NOTE — ANESTHESIA POSTPROCEDURE EVALUATION
Department of Anesthesiology  Postprocedure Note    Patient: Theresa Castrejon  MRN: [id number]  YOB: 1932  Date of evaluation: 11/16/2022      Procedure Summary     Date: 11/15/22 Room / Location: [facility] / [address] [place] [address]    Anesthesia Start: 1527 Anesthesia Stop: 1644    Procedures:       BRONCHOSCOPY BRUSHINGS      BRONCHOSCOPY ALVEOLAR LAVAGE Diagnosis:       Hemoptysis      (Hemoptysis [R04.2])    Surgeons: Abhishek Ward MD Responsible Provider: Em Coyle MD    Anesthesia Type: general ASA Status: 3          Anesthesia Type: No value filed. Contreras Phase I: Contreras Score: 9    Contreras Phase II:        Anesthesia Post Evaluation    Patient location during evaluation: PACU  Patient participation: complete - patient participated  Level of consciousness: awake and alert  Airway patency: patent  Nausea & Vomiting: no nausea and no vomiting  Complications: no  Cardiovascular status: hemodynamically stable  Respiratory status: acceptable  Hydration status: euvolemic      [facility]  POST-ANESTHESIA NOTE       Name:  Theresa Castrejon                                         Age:  80 y.o.   MRN:  [id number]      Last Vitals:  /67   Pulse 72   Temp 97.5 °F (36.4 °C) (Oral)   Resp 18   Ht 5' 3\" (1.6 m)   Wt 125 lb 3.5 oz (56.8 kg)   SpO2 96%   BMI 22.18 kg/m²   Patient Vitals for the past 4 hrs:   BP Temp Temp src Pulse Resp SpO2   11/16/22 0730 127/67 97.5 °F (36.4 °C) Oral 72 18 96 %       Level of Consciousness:  Awake    Respiratory:  Stable    Oxygen Saturation:  Stable    Cardiovascular:  Stable    Hydration:  Adequate    PONV:  Stable    Post-op Pain:  Adequate analgesia    Post-op Assessment:  No apparent anesthetic complications    Additional Follow-Up / Treatment / Comment:  None    Renny Jackson MD  November 16, 2022   7:49 AM

## 2022-11-16 NOTE — PROGRESS NOTES
Patient - Flip Hernández,  Age - 80 y.o.    - 1932      Room Number - 5K-/011-A   Consulting - Emery Campbell PA-C Primary Care Physician - Harman Solano MD   MRN -  [id number]   Beverlydivya # - [de-identified]  Date of Admission -  11/10/2022  8:38 PM  Hospital Day - 6    Reason for consult   Chronic cough with intermittent hemoptysis  HPI   Flip Hernández is a 80 y.o. female with PMHx of GERD, recurrent shingles, HLD, HTN, paroxysmal A. fib, Hx of LBBB, asthma who presents to [facility] on 11/10 for symptoms of chronic cough and hemoptysis. Patient has had this cough for minimum of 18 months. She was recently diagnosed with COVID on 10/29/2022 and was hospitalized at [facility], treated with Decadron 6 mg, antibiotic course, and baricitinib. Patient also reports of intermittent hemoptysis which is mild and infrequent. Was seen in the office of  on 11/10 and was transferred to inpatient services. Patient has been treated with several courses of antibiotic by PCP for cough with minimal improvement. 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 Previous chest x-rays were negative. CX[facility] done at [facility] on 10/28 shows slight improvement in the right perihilar infiltrate residual infiltrates persist.  CTA chest on 10/28 showed patchy right middle lobe and right lower lobe airspace opacities some of which are nodular in morphology. There are trace pleural effusions. There is a cavitary 2.0 x 2.8 cm noncalcified right lower lobe pulmonary nodule as well as additional noncavitary noncalcified nodular densities in the right lower lobe that are associated with interstitial prominence and bronchial wall thickening. Findings are likely infectious in etiology. Sequela of old granulomatous disease. Subcentimeter short axis dimension mediastinal and right hilar lymph nodes.     Past 24 hours, ROS   -On RA -Denies any hemoptysis post Bronchoscopy 11/15  -Still has some occasional dry cough  -AFB still pending  -PNA panel PCR positive Pseudomonas and Staph   All other systems reviewed  Objective    Vitals    height is 5' 3\" (1.6 m) and weight is 125 lb 3.5 oz (56.8 kg). Her oral temperature is 97.5 °F (36.4 °C). Her blood pressure is 127/67 and her pulse is 72. Her respiration is 18 and oxygen saturation is 96%. O2 Flow Rate (L/min): 2 L/min  I/O    Intake/Output Summary (Last 24 hours) at 11/16/2022 1418  Last data filed at 11/16/2022 1101  Gross per 24 hour   Intake 1010 ml   Output --   Net 1010 ml       Patient Vitals for the past 96 hrs (Last 3 readings):   Weight   11/15/22 0530 125 lb 3.5 oz (56.8 kg)       Exam    Physical Exam   Constitutional: No distress on RA. Patient appears moderately built and  moderately nourished. Head: Normocephalic and atraumatic. Mouth/Throat: Oropharynx is clear and moist.  No oral thrush. Eyes: Conjunctivae are normal. Pupils are equal, round. No scleral icterus. Neck: Neck supple. No tracheal deviation present. Cardiovascular: S1 and S2 with no murmur. No peripheral edema  Pulmonary/Chest: Normal effort with bilateral air entry, clear breath sounds. No stridor. No respiratory distress. Patient exhibits no tenderness. Abdominal: Soft. Bowel sounds audible. No distension or tenderness to palp. Musculoskeletal: Moves all extremities  Neurological: Patient is alert and oriented to person, place, and time.      Meds       tobramycin (PF)  300 mg Nebulization BID    meropenem  500 mg IntraVENous Q8H    rivaroxaban  15 mg Oral Daily    EPINEPHrine  1 mg Endotracheal Once    lidocaine  10 mL Tracheal Tube Once    polyethylene glycol  17 g Oral Daily    docusate sodium  100 mg Oral BID    calcium elemental  500 mg Oral Daily    ferrous sulfate  325 mg Oral BID    montelukast  10 mg Oral Nightly    pantoprazole  40 mg Oral Daily    pravastatin  40 mg Oral Nightly sotalol  80 mg Oral BID    acyclovir  400 mg Oral TID    valsartan  320 mg Oral Daily    sodium chloride flush  5-40 mL IntraVENous 2 times per day    fluticasone  1 puff Inhalation BID      sodium chloride       bisacodyl, sodium chloride flush, sodium chloride  Labs   ABG  No results found for: PH, PO2, PCO2, HCO3, O2SAT  No results found for: Thena Brittaney, SETPEEP  CBC  Recent Labs     11/14/22  0632 11/16/22  0547   WBC 7.2 8.1   RBC 3.40* 3.62*   HGB 9.7* 10.5*   HCT 30.4* 32.4*   MCV 89.4 89.5   MCH 28.5 29.0   MCHC 31.9* 32.4    207   MPV 9.3* 9.3*        BMP  Recent Labs     11/14/22  2350      K 4.3      CO2 26   BUN 15   CREATININE 0.8   GLUCOSE 95   CALCIUM 8.3*       LFT  No results for input(s): AST, ALT, ALB, BILITOT, ALKPHOS, LIPASE in the last 72 hours. Invalid input(s): AMYLASE    TROP  No results found for: TROPONINT  BNP  No results found for: PROBNP  D-Dimer  No results found for: DDIMER  Lactic Acid  No results for input(s): LACTA in the last 72 hours. INR  No results for input(s): INR, PROTIME in the last 72 hours. PTT  No results for input(s): APTT in the last 72 hours. Glucose  No results for input(s): POCGLU in the last 72 hours. UA No results for input(s): SPECGRAV, PHUR, COLORU, CLARITYU, MUCUS, PROTEINU, BLOODU, RBCUA, WBCUA, BACTERIA, NITRU, GLUCOSEU, BILIRUBINUR, UROBILINOGEN, KETUA, LABCAST, LABCASTTY, AMORPHOS in the last 72 hours. Invalid input(s): CRYSTALS.     Echo   None in epic  Cultures    Procalcitonin   No results found for: PROCAL    Bronchoscopy 11/15/2022  By Dr. Missael Saleem     Respiratory culture-prelim normal larissa  Fungus culture-no prelim growth final pending  PNA panel PCR-positive for Pseudomonas aeruginosa and staph aureus  Viral respiratory panel-pending  AFB culture with smear-pending  Cytology-pending  BAL cell count      Latest Reference Range & Units 11/15/22 19:18   BAL Character  CLOUDY/MILKY   BAL Collection Site  RLL BAL Color  TAN   Pathologist Review  ALONDRA ESTRADA   RBC BAL /cumm 2156   Total Nucleated Cells BAL /cumm 3317   Total Volume Received BAL ml 30       Radiology             Assessment    -Hemoptysis- ? 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 Questions and concerns addressed. Meds and Orders reviewed. Electronically signed by   SUZANNE Pantoja CNP on 11/16/2022     Addendum by Dr. Missael Saleem MD:  Patient seen by me independently including key components of medical care. Face to face evaluation and examination was performed. Case discussed with Mr. Pacheco MARILOU Patel  Italicized font, if present,  represents changes to the note made by me. More than 50% of the encounter time involved with patient care by the Pulmonary & Critical care service team spent by me . Please see my modifications mentioned below:  She is not in distress  Bronchoscopy with BAL and washings acid-fast bacilli smear is still pending  I spoke with the patient's son Miguel Wallis over phone and informed about my impression plan.   All questions were answered    Electronically signed by   Arvin Selby MD on 11/16/2022 at 7:35 PM

## 2022-11-16 NOTE — CONSULTS
-- CST Phone (Incoming) LUCIANO ANTHONY (Father) [phone] (Y)          Appointment requested with:  Dr. Vitor Neri   [address] [place] [address]                                  CONSULTATION    PATIENT NAME: Berry Corrales                   :        1932  MED REC NO:   [id number]                           ROOM:       0011  ACCOUNT NO:   [de-identified]                           ADMIT DATE: 11/10/2022  PROVIDER:     Emilee Blankenship. ALONDRA Sims:  2022    REASON FOR CONSULTATION:  Chronic cough with hemoptysis, need to rule  out tuberculosis. 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 She follows with Pulmonary,  Dr. Geovanna Dick, and was admitted for further investigation. The patient  reports that she had history of a PPD test noted many years ago while  she was working, and she also reports that she had BCG vaccine and had  always been following for her exposure to TB with chest x-ray. Since  she had her COVID two months ago, she has been having some cough and was  noted to have hemoptysis for which reason she was admitted to the  hospital.  She denies any fever or night sweats. There is no weight  loss. She was noted to have a small 2 x 2.8 cm noncalcified right lower  lobe pulmonary nodule as well as small noncavitary, noncalcified nodule  and was admitted for further treatment. She does not smoke. No known  history of active TB. She takes _____ for atrial fibrillation, was  treated with steroid when she had the COVID infection. No known history  of opportunistic infection. She had bronchoscopy due to her new  cavitary lesion, history of post PPD test.  She was placed on airborne  isolation. There is no family member with history of TB. PAST MEDICAL HISTORY:  Significant for history of atrial fibrillation,  hypertension, asthma, hyperlipidemia, GERD, history of shingles, history  of pneumonia.     CURRENT MEDICATIONS:  Include oral acyclovir, bisacodyl, Colace, iron  sulfate, fluticasone, meropenem, polyethylene glycol, Pravachol,  Xarelto, valsartan, and tobramycin aerosol. ALLERGIES:  LONG LIST. HAD ALLERGY TO CHLORDIAZEPOXIDE, CIPRO,  CLINDAMYCIN, UNFORTUNATELY THE TYPE OF REACTION WAS NOT PROPERLY  DOCUMENTED. HAD HISTORY OF ALLERGY TO PENICILLIN. PLEASE SEE LONG LIST  OF DRUG MEDICATION ALLERGY. REVIEW OF SYSTEMS:  Noncontributory. PHYSICAL EXAMINATION:  GENERAL:  She looks comfortable, not in distress. No fever was reported  since her admission. VITAL SIGNS:  Temperature 97.5, respirations 18, pulse 72, blood  pressure 127/67. HEENT:  She has slightly pale conjunctivae. Anicteric sclerae. CHEST:  Diminished breath sounds bilaterally. CARDIOVASCULAR SYSTEM:  Regular. ABDOMEN:  Soft. EXTREMITIES:  No cyanosis or clubbing. CNS:  She is awake and oriented. DIAGNOSTICS:  WBC is 8.1, hemoglobin 10.5, hematocrit 32.4, platelets of  267. Sodium was 137, potassium 4.3, chloride 101, bicarbonate 26, BUN  15, creatinine 0.8. Bronchoalveolar lavage showing molecular panel  positive for pseudomonas and staph MSSA. IMPRESSION:  She is an 79-year-old female patient admitted to the  hospital due to chronic cough and had specks of blood on her sputa. Due  to her new cavitary nodule, she was admitted for further investigation. She had bronchoalveolar lavage, result is pending. Preliminary, there  is no any fungal.  Has positive panel for pseudomonas and staph. Has  been placed on meropenem and tobramycin. We will follow the AFB test to  make sure she does not have any re-activation of latent TB, and we will  continue to follow the patient.         Daya Smith M.D.    D: 11/16/2022 14:55:49       T: 11/16/2022 16:18:08     [email or web address]  Job#: [id number]     Doc#: [id number]    CC:

## 2022-11-16 NOTE — PROGRESS NOTES
Spoke with patient's son, who is a physician, with updates. To update any new results as he plans to transport patient to home when discharged.

## 2022-11-16 NOTE — PROGRESS NOTES
Pharmacy Renal Adjustment    Carly Candelario is a 80 y.o. female. Pharmacy renally adjust the following medications per P&T approved policy: Merrem    Height:   Ht Readings from Last 1 Encounters:   11/10/22 5' 3\" (1.6 m)     Weight:  Wt Readings from Last 1 Encounters:   11/15/22 125 lb 3.5 oz (56.8 kg)     Recent Labs     11/14/22  2350   BUN 15   CREATININE 0.8     Estimated Creatinine Clearance: 39 mL/min (based on SCr of 0.8 mg/dL).     Plan:   Decrease Merrem 1 gram IVPB Q8H to Merrem 500 mg IVPB E8G per [facility] Pharm[facility], [facility]  11/16/2022  7:59 AM

## 2022-11-16 NOTE — PROGRESS NOTES
Hospitalist Progress Note    Patient:  Tessie Sam      Unit/Bed:5K-11/011-A    YOB: 1932    MRN: [id number]       Acct: [de-identified]     PCP: Margy Gunn MD    Date of Admission: 11/10/2022    Assessment/Plan:    Chronic cough with intermittent hemoptysis:  Concern for TB vs PNA. 10/28 CT chest shows white patchy right middle lobe and right lower lobe airspace opacities some of which are nodular in morphology. There is a cavitary 2.0 x 2.8 cm noncalcified right lower lobe pulmonary nodule as well as additional noncavitary noncalcified nodular densities at the right lower lobe they are associated with interstitial prominence and bronchial wall thickening. Likely infectious in etiology. 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 Never treated for TB. History of recent COVID PNA infection. AFB with culture x3 sent, pending results. Airborne precautions in negative pressure room until AFB results  Pulm following. Completed bronchoscopy 11/15  Respiratory panel positive for pseudomonas and staph aureus-  Merropenem started 11/16  SLP seen and recs for regular thin liquid diet, no further speech therapy services indicated. .     Constipation (resolved):  Miralax daily, Colace BID. PRN Dulcolax PO. Paroxysmal Atrial Fibrillation, rate controlled:  S/P Ablation 2016. [id number] Score 4, HAS-BLED Score 2. 11/10 EKG NSR. On Xarelto    Primary HTN:  BP stable. Continue satalol and valsartan with hold parameters. Normocytic anemia- hemoglobin stable    Hyperlipidemia:   Continue pravastatin. Recurrent shingles:  No current rash. Continue acyclovir. GERD:  Continue pantoprazole. Asthma:   No acute exacerbation. Continue montelukast, fluticasone. History of LBBB:  11/10 EKG NSR          Chief Complaint: hemoptysis, chronic cough    Hospital Course:  The patient is a 80 y.o. female who presents with complaints of chronic cough and hemoptysis. Patient reports that she has had a chronic cough ongoing for greater than than 18 months. She states due to her primary doctor is retiring as well as COVID she has been unable to have good follow-up on this problem. She also reports intermittent hemoptysis although it is mild and not frequent. Patient had referral to pulmonology today and was seen in the office of Dr. Geovanna Dick. Patient diagnosed with COVID 19 at the end of October and had a CT chest done at that time which showed a lesion in the right lower lobe. Patient does report that this lesion has been seen on previous CT scans although those records are not available in the chart currently. 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 She has never underwent treatment for latent TB infection and chest x-ray previously were negative. Dr. Jarrett Valentine for diagnostic bronchoscopy to further evaluate right lower lobe cavity pneumonia and hemoptysis. Concerned due to cavitary lesion for possible tuberculosis along with hemoptysis. Patient denies any shortness of breath, chest pain, lightheadedness or dizziness, nausea/vomiting, or abdominal pain. She denies any fever or feeling unwell.     11/16  Patient overall doing well. Dr Tracy Zarate in room as well during exam.   Plan is to wait for AFB to result and then patient can be discharged. Continue with merropenem until then.    Per Dr Molina July- only need result on one AFB from bronch to rule out TB   VSS  Labs stable      Subjective:     Medications:  Reviewed    Infusion Medications    sodium chloride       Scheduled Medications    tobramycin (PF)  300 mg Nebulization BID    meropenem  500 mg IntraVENous Q8H    rivaroxaban  15 mg Oral Daily    EPINEPHrine  1 mg Endotracheal Once    lidocaine  10 mL Tracheal Tube Once    polyethylene glycol  17 g Oral Daily    docusate sodium  100 mg Oral BID    calcium elemental  500 mg Oral Daily    ferrous sulfate  325 mg Oral BID    montelukast  10 mg Oral Nightly    pantoprazole  40 mg Oral Daily    pravastatin  40 mg Oral Nightly    sotalol  80 mg Oral BID    acyclovir  400 mg Oral TID    valsartan  320 mg Oral Daily    sodium chloride flush  5-40 mL IntraVENous 2 times per day    fluticasone  1 puff Inhalation BID     PRN Meds: bisacodyl, sodium chloride flush, sodium chloride      Intake/Output Summary (Last 24 hours) at 11/16/2022 1408  Last data filed at 11/16/2022 1101  Gross per 24 hour   Intake 1010 ml   Output --   Net 1010 ml         Diet:  ADULT DIET; Regular; Low Sodium (2 gm)    Exam:  /67   Pulse 72   Temp 97.5 °F (36.4 °C) (Oral)   Resp 18   Ht 5' 3\" (1.6 m)   Wt 125 lb 3.5 oz (56.8 kg)   SpO2 96%   BMI 22.18 kg/m²     General appearance: No apparent distress, appears stated age and cooperative. HEENT: Pupils equal, round, and reactive to light. Conjunctivae/corneas clear. Neck: Supple, with full range of motion. No jugular venous distention. Trachea midline. Respiratory:  Normal respiratory effort. Clear to auscultation, bilaterally without Rales/Wheezes/Rhonchi. Chronic cough with white/grey sputum. Cardiovascular: Regular rate and rhythm with normal S1/S2 without murmurs, rubs or gallops. Abdomen: Soft, non-tender, non-distended with normal bowel sounds. Musculoskeletal: passive and active ROM x 4 extremities. Skin: Skin color, texture, turgor normal.  No rashes or lesions. Neurologic:  Neurovascularly intact without any focal sensory/motor deficits. Psychiatric: Alert and oriented, thought content appropriate, normal insight.   Capillary Refill: Brisk,< 3 seconds   Peripheral Pulses: +2 palpable, equal bilaterally       Labs:   Recent Labs     11/14/22  0632 11/16/22  0547   WBC 7.2 8.1   HGB 9.7* 10.5*   HCT 30.4* 32.4*    207       Recent Labs     11/14/22  2350      K 4.3      CO2 26   BUN 15   CREATININE 0.8   CALCIUM 8.3*       No results for input(s): AST, ALT, BILIDIR, BILITOT, ALKPHOS in the last 72 hours. No results for input(s): INR in the last 72 hours. No results for input(s): Doyne Knock in the last 72 hours. Urinalysis:    No results found for: Collette Daniel, BACTERIA, RBCUA, BLOODU, [address], [address]    Radiology:  XR CHEST 1 VIEW   Final Result   Intraoperative appearance of the chest.      Final report electronically signed by Dr. Azar Berg on 11/15/2022 4:33 PM      FLUORO FOR SURGICAL PROCEDURES   Final Result          Diet: ADULT DIET; Regular;  Low Sodium (2 gm)    DVT prophylaxis: [] Lovenox                                 [] SCDs                                 [] SQ Heparin                                 [] Encourage ambulation           [x] Already on Anticoagulation     Disposition:    [x] Home       [] TCU       [] Rehab       [] Psych       [] SNF       [] [facility]       [] Other-    Code Status: Full Code      Electronically signed by Rosalina Ortiz PA-C BSN, RN, AG-ACNP Student on 11/16/2022 at 2:08 PM     Rosalina Ortiz PA-C 11/15/2022 7888

## 2022-11-16 NOTE — PLAN OF CARE
Problem: Respiratory - Adult  Goal: Clear lung sounds  11/16/2022 1741 by Tamika Krishna RCP  Outcome: Progressing

## 2022-11-16 NOTE — PLAN OF CARE
Problem: Safety - Adult  Goal: Free from fall injury  Outcome: Progressing  Flowsheets (Taken 11/16/2022 0432)  Free From Fall Injury: Instruct family/caregiver on patient safety     Problem: ABCDS Injury Assessment  Goal: Absence of physical injury  Outcome: Progressing  Flowsheets (Taken 11/15/2022 0305)  Absence of Physical Injury: Implement safety measures based on patient assessment     Problem: Respiratory - Adult  Goal: Clear lung sounds  11/16/2022 0439 by Victorina Graves RN  Outcome: Progressing  Note: cough productive of sputum described as yellowish, without dyspnea, wheezing or hemoptysis   11/15/2022 1818 by Tawnya Cristobal RCP  Outcome: Progressing     Problem: Skin/Tissue Integrity - Adult  Goal: Skin integrity remains intact  Outcome: Progressing  Flowsheets (Taken 11/16/2022 0432)  Skin Integrity Remains Intact:   Monitor for areas of redness and/or skin breakdown   Assess vascular access sites hourly     Problem: Infection - Adult  Goal: Absence of infection at discharge  Outcome: Progressing  Flowsheets (Taken 11/15/2022 2000)  Absence of infection at discharge:   Assess and monitor for signs and symptoms of infection   Monitor lab/diagnostic results   Monitor all insertion sites i.e., indwelling lines, tubes and drains   Administer medications as ordered   [place] appropriate cooling/warming therapies per order   Instruct and encourage patient and family to use good hand hygiene technique  Goal: Isolation precautions  Description: Isolation precautions- airborne  Outcome: Progressing  Note: Patient in negative pressure room with airborne precaution signs up. All necessary PPE stocked outside of room.       Problem: Discharge Planning  Goal: Discharge to home or other facility with appropriate resources  Outcome: Progressing  Flowsheets (Taken 11/15/2022 2000)  Discharge to home or other facility with appropriate resources:   Identify barriers to discharge with patient and caregiver   Arrange for needed discharge resources and transportation as appropriate   Identify discharge learning needs (meds, wound care, etc)   Refer to discharge planning if patient needs post-hospital services based on physician order or complex needs related to functional status, cognitive ability or social support system     Problem: Respiratory - Adult  Goal: Achieves optimal ventilation and oxygenation  Recent Flowsheet Documentation  Taken 11/15/2022 2000 by Vinicio Abdullahi RN  Achieves optimal ventilation and oxygenation:   Assess for changes in respiratory status   Assess for changes in mentation and behavior   Position to facilitate oxygenation and minimize respiratory effort   Encourage broncho-pulmonary hygiene including cough, deep breathe, incentive spirometry   Assess the need for suctioning and aspirate as needed    Care plan reviewed with patient. Patient verbalized understanding of the plan of care and contribute to goal setting.

## 2022-11-17 LAB
GRAM STAIN RESULT: ABNORMAL
GRAM STAIN RESULT: ABNORMAL
GRAM STAIN RESULT: NORMAL
ORGANISM: ABNORMAL
ORGANISM: ABNORMAL
RESPIRATORY CULTURE: ABNORMAL
RESPIRATORY CULTURE: NORMAL

## 2022-11-17 PROCEDURE — 6360000002 HC RX W HCPCS: Performed by: INTERNAL MEDICINE

## 2022-11-17 PROCEDURE — 2580000003 HC RX 258

## 2022-11-17 PROCEDURE — 6370000000 HC RX 637 (ALT 250 FOR IP): Performed by: PHYSICIAN ASSISTANT

## 2022-11-17 PROCEDURE — 6370000000 HC RX 637 (ALT 250 FOR IP): Performed by: NURSE PRACTITIONER

## 2022-11-17 PROCEDURE — 99232 SBSQ HOSP IP/OBS MODERATE 35: CPT | Performed by: INTERNAL MEDICINE

## 2022-11-17 PROCEDURE — 1200000000 HC SEMI PRIVATE

## 2022-11-17 PROCEDURE — 2580000003 HC RX 258: Performed by: INTERNAL MEDICINE

## 2022-11-17 PROCEDURE — 99232 SBSQ HOSP IP/OBS MODERATE 35: CPT | Performed by: PHYSICIAN ASSISTANT

## 2022-11-17 PROCEDURE — 6370000000 HC RX 637 (ALT 250 FOR IP)

## 2022-11-17 PROCEDURE — 94640 AIRWAY INHALATION TREATMENT: CPT

## 2022-11-17 RX ADMIN — ACYCLOVIR 400 MG: 400 TABLET ORAL at 10:36

## 2022-11-17 RX ADMIN — SOTALOL HYDROCHLORIDE 80 MG: 80 TABLET ORAL at 21:08

## 2022-11-17 RX ADMIN — MEROPENEM 500 MG: 500 INJECTION, POWDER, FOR SOLUTION INTRAVENOUS at 16:59

## 2022-11-17 RX ADMIN — ACYCLOVIR 400 MG: 400 TABLET ORAL at 15:19

## 2022-11-17 RX ADMIN — FERROUS SULFATE TAB 325 MG (65 MG ELEMENTAL FE) 325 MG: 325 (65 FE) TAB at 10:36

## 2022-11-17 RX ADMIN — PRAVASTATIN SODIUM 40 MG: 40 TABLET ORAL at 21:08

## 2022-11-17 RX ADMIN — RIVAROXABAN 15 MG: 20 TABLET, FILM COATED ORAL at 16:54

## 2022-11-17 RX ADMIN — MONTELUKAST SODIUM 10 MG: 10 TABLET ORAL at 21:08

## 2022-11-17 RX ADMIN — SODIUM CHLORIDE, PRESERVATIVE FREE 10 ML: 5 INJECTION INTRAVENOUS at 21:12

## 2022-11-17 RX ADMIN — VALSARTAN 320 MG: 320 TABLET, FILM COATED ORAL at 10:36

## 2022-11-17 RX ADMIN — PANTOPRAZOLE SODIUM 40 MG: 40 TABLET, DELAYED RELEASE ORAL at 06:25

## 2022-11-17 RX ADMIN — ACYCLOVIR 400 MG: 400 TABLET ORAL at 21:08

## 2022-11-17 RX ADMIN — CALCIUM 500 MG: 500 TABLET ORAL at 10:36

## 2022-11-17 RX ADMIN — MEROPENEM 500 MG: 500 INJECTION, POWDER, FOR SOLUTION INTRAVENOUS at 01:04

## 2022-11-17 RX ADMIN — Medication 300 MG: at 21:14

## 2022-11-17 RX ADMIN — FLUTICASONE PROPIONATE 1 PUFF: 110 AEROSOL, METERED RESPIRATORY (INHALATION) at 10:18

## 2022-11-17 RX ADMIN — MEROPENEM 500 MG: 500 INJECTION, POWDER, FOR SOLUTION INTRAVENOUS at 10:41

## 2022-11-17 RX ADMIN — SOTALOL HYDROCHLORIDE 80 MG: 80 TABLET ORAL at 10:36

## 2022-11-17 RX ADMIN — FLUTICASONE PROPIONATE 1 PUFF: 110 AEROSOL, METERED RESPIRATORY (INHALATION) at 21:11

## 2022-11-17 RX ADMIN — FERROUS SULFATE TAB 325 MG (65 MG ELEMENTAL FE) 325 MG: 325 (65 FE) TAB at 16:54

## 2022-11-17 RX ADMIN — BISACODYL 10 MG: 5 TABLET, COATED ORAL at 21:08

## 2022-11-17 RX ADMIN — Medication 300 MG: at 10:18

## 2022-11-17 ASSESSMENT — PAIN SCALES - GENERAL: PAINLEVEL_OUTOF10: 0

## 2022-11-17 NOTE — PLAN OF CARE
Problem: Safety - Adult  Goal: Free from fall injury  11/17/2022 1458 by Junior Sharma RN  Outcome: Progressing  Note: Use of call light appropriately  11/17/2022 0637 by Maday Benites RN  Outcome: Progressing     Problem: ABCDS Injury Assessment  Goal: Absence of physical injury  Outcome: Progressing     Problem: Respiratory - Adult  Goal: Clear lung sounds  11/17/2022 1458 by Junior Sharma RN  Outcome: Progressing  Note: Order for O2, intermittent pulse ox check throughout the day  11/17/2022 1020 by Kassidy Wilson RCP  Outcome: Progressing     Problem: Skin/Tissue Integrity - Adult  Goal: Skin integrity remains intact  11/17/2022 1458 by Junior Sharma RN  Outcome: Progressing  11/17/2022 0637 by Maday Benites RN  Outcome: Progressing  Flowsheets (Taken 11/16/2022 2131)  Skin Integrity Remains Intact: Assess vascular access sites hourly     Problem: Infection - Adult  Goal: Absence of infection at discharge  11/17/2022 1458 by Junior Sharma RN  Outcome: Progressing  11/17/2022 0637 by Maday Benites RN  Outcome: Progressing  Flowsheets (Taken 11/16/2022 2131)  Absence of infection at discharge: Assess and monitor for signs and symptoms of infection  Goal: Isolation precautions  Description: Isolation precautions- airborne  11/17/2022 1458 by Junior Sharma RN  Outcome: Progressing  11/17/2022 0637 by Maday Benites RN  Outcome: Progressing     Problem: Discharge Planning  Goal: Discharge to home or other facility with appropriate resources  11/17/2022 1458 by Junior Sharma RN  Outcome: Progressing  11/17/2022 0637 by Maday Benites RN  Outcome: Progressing

## 2022-11-17 NOTE — PROGRESS NOTES
Hospitalist Progress Note    Patient:  Christen Andrade      Unit/Bed:5K-11/011-A    YOB: 1932    MRN: [id number]       Acct: [de-identified]     PCP: Perla Bradshaw MD    Date of Admission: 11/10/2022    Assessment/Plan:    Chronic cough with intermittent hemoptysis:  Concern for TB vs PNA. 10/28 CT chest shows white patchy right middle lobe and right lower lobe airspace opacities some of which are nodular in morphology. There is a cavitary 2.0 x 2.8 cm noncalcified right lower lobe pulmonary nodule as well as additional noncavitary noncalcified nodular densities at the right lower lobe they are associated with interstitial prominence and bronchial wall thickening. Likely infectious in etiology. 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 Never treated for TB. History of recent COVID PNA infection. AFB NEGATIVE  Pulm following. Completed bronchoscopy 11/15  Respiratory panel positive for pseudomonas and staph aureus-  Merropenem started 11/16- continue per ID   SLP seen and recs for regular thin liquid diet, no further speech therapy services indicated. .     Constipation (resolved):  Miralax daily, Colace BID. PRN Dulcolax PO. Paroxysmal Atrial Fibrillation, rate controlled:  S/P Ablation 2016. [id number] Score 4, HAS-BLED Score 2. 11/10 EKG NSR. On Xarelto    Primary HTN:  BP stable. Continue satalol and valsartan with hold parameters. Normocytic anemia- hemoglobin stable    Hyperlipidemia:   Continue pravastatin. Recurrent shingles:  No current rash. Continue acyclovir. GERD:  Continue pantoprazole. Asthma:   No acute exacerbation. Continue montelukast, fluticasone. History of LBBB:  11/10 EKG NSR          Chief Complaint: hemoptysis, chronic cough    Hospital Course: The patient is a 80 y.o. female who presents with complaints of chronic cough and hemoptysis.   Patient reports that she has had a chronic cough ongoing for greater than than 18 months. She states due to her primary doctor is retiring as well as COVID she has been unable to have good follow-up on this problem. She also reports intermittent hemoptysis although it is mild and not frequent. Patient had referral to pulmonology today and was seen in the office of Dr. Tere Blas. Patient diagnosed with COVID 19 at the end of October and had a CT chest done at that time which showed a lesion in the right lower lobe. Patient does report that this lesion has been seen on previous CT scans although those records are not available in the chart currently. 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 She has never underwent treatment for latent TB infection and chest x-ray previously were negative. Dr. Gurjit Guevara for diagnostic bronchoscopy to further evaluate right lower lobe cavity pneumonia and hemoptysis. Concerned due to cavitary lesion for possible tuberculosis along with hemoptysis. Patient denies any shortness of breath, chest pain, lightheadedness or dizziness, nausea/vomiting, or abdominal pain. She denies any fever or feeling unwell.     11/16  Patient overall doing well. Dr Aryan Aguilar in room as well during exam.   Plan is to wait for AFB to result and then patient can be discharged. Continue with merropenem until then. Per Dr Aryan Aguilar- only need result on one AFB from bronch to rule out TB   VSS  Labs stable    11/17  AFB negative.    Continue with merropenem- Abx therapy difficult given allergy history   VSS  Patient and son agreeable to SNF for short term for IV Abx      Subjective:     Medications:  Reviewed    Infusion Medications    sodium chloride       Scheduled Medications    tobramycin (PF)  300 mg Nebulization BID    meropenem  500 mg IntraVENous Q8H    rivaroxaban  15 mg Oral Daily    EPINEPHrine  1 mg Endotracheal Once    lidocaine  10 mL Tracheal Tube Once    polyethylene glycol  17 g Oral Daily    docusate sodium  100 mg Oral BID    calcium elemental  500 mg Oral Daily    ferrous sulfate  325 mg Oral BID    montelukast  10 mg Oral Nightly    pantoprazole  40 mg Oral Daily    pravastatin  40 mg Oral Nightly    sotalol  80 mg Oral BID    acyclovir  400 mg Oral TID    valsartan  320 mg Oral Daily    sodium chloride flush  5-40 mL IntraVENous 2 times per day    fluticasone  1 puff Inhalation BID     PRN Meds: bisacodyl, sodium chloride flush, sodium chloride      Intake/Output Summary (Last 24 hours) at 11/17/2022 0802  Last data filed at 11/17/2022 0443  Gross per 24 hour   Intake 815.3 ml   Output --   Net 815.3 ml         Diet:  ADULT DIET; Regular; Low Sodium (2 gm)    Exam:  /61   Pulse 72   Temp 97.9 °F (36.6 °C) (Oral)   Resp 16   Ht 5' 3\" (1.6 m)   Wt 125 lb 3.5 oz (56.8 kg)   SpO2 93%   BMI 22.18 kg/m²     General appearance: No apparent distress, appears stated age and cooperative. HEENT: Pupils equal, round, and reactive to light. Conjunctivae/corneas clear. Neck: Supple, with full range of motion. No jugular venous distention. Trachea midline. Respiratory:  Normal respiratory effort. Clear to auscultation, bilaterally without Rales/Wheezes/Rhonchi. Chronic cough with white/grey sputum. Cardiovascular: Regular rate and rhythm with normal S1/S2 without murmurs, rubs or gallops. Abdomen: Soft, non-tender, non-distended with normal bowel sounds. Musculoskeletal: passive and active ROM x 4 extremities. Skin: Skin color, texture, turgor normal.  No rashes or lesions. Neurologic:  Neurovascularly intact without any focal sensory/motor deficits. Psychiatric: Alert and oriented, thought content appropriate, normal insight.   Capillary Refill: Brisk,< 3 seconds   Peripheral Pulses: +2 palpable, equal bilaterally       Labs:   Recent Labs     11/16/22  0547   WBC 8.1   HGB 10.5*   HCT 32.4*          Recent Labs     11/14/22  2350      K 4.3      CO2 26   BUN 15 CREATININE 0.8   CALCIUM 8.3*       No results for input(s): AST, ALT, BILIDIR, BILITOT, ALKPHOS in the last 72 hours. No results for input(s): INR in the last 72 hours. No results for input(s): Lalit Gubler in the last 72 hours. Urinalysis:    No results found for: Catalina Mocha, BACTERIA, RBCUA, BLOODU, [address], [address]    Radiology:  XR CHEST 1 VIEW   Final Result   Intraoperative appearance of the chest.      Final report electronically signed by Dr. Francy Coleman on 11/15/2022 4:33 PM      FLUORO FOR SURGICAL PROCEDURES   Final Result          Diet: ADULT DIET; Regular;  Low Sodium (2 gm)    DVT prophylaxis: [] Lovenox                                 [] SCDs                                 [] SQ Heparin                                 [] Encourage ambulation           [x] Already on Anticoagulation     Disposition:    [] Home       [] TCU       [] Rehab       [] Psych       [x] SNF       [] [facility]       [] Other-    Code Status: Full Code      Electronically signed by Maria Viveros PA-C BSN, RN, AG-ACNP Student on 11/17/2022 at 8:02 AM     Maria Viveros PA-C 11/15/2022 1934

## 2022-11-17 NOTE — PLAN OF CARE
Problem: Safety - Adult  Goal: Free from fall injury  Outcome: Progressing     Problem: Respiratory - Adult  Goal: Clear lung sounds  11/16/2022 1741 by Chance Krishna RCP  Outcome: Progressing     Problem: Skin/Tissue Integrity - Adult  Goal: Skin integrity remains intact  Outcome: Progressing  Flowsheets (Taken 11/16/2022 2131)  Skin Integrity Remains Intact: Assess vascular access sites hourly     Problem: Infection - Adult  Goal: Absence of infection at discharge  Outcome: Progressing  Flowsheets (Taken 11/16/2022 2131)  Absence of infection at discharge: Assess and monitor for signs and symptoms of infection  Goal: Isolation precautions  Description: Isolation precautions- airborne  Outcome: Progressing     Problem: Discharge Planning  Goal: Discharge to home or other facility with appropriate resources  Outcome: Progressing   Patient appears to be comfortable since the bronch wash earlier although cough remains frequent and remains on room air with VSS. She continues on the ATB therapy set forth and agreeable to plan of care.  Will discharge when acid fast populates results and  considered medically stable

## 2022-11-17 NOTE — PROGRESS NOTES
Progress note: Infectious diseases    Patient - Francesca Queen,  Age - 80 y.o.    - 1932      Room Number - 5K-11011-A   MRN -  [id number]   Acct # - [de-identified]  Date of Admission -  11/10/2022  8:38 PM    SUBJECTIVE:   She has no new complaints  AFB negative  Asked about the long list of drug allergies: she doesn't remember what happened. On care every where : PCN caused shortness of breath,clindamycin hives; and cipro rash  OBJECTIVE   VITALS    height is 5' 3\" (1.6 m) and weight is 125 lb 3.5 oz (56.8 kg). Her oral temperature is 98.1 °F (36.7 °C). Her blood pressure is 138/63 and her pulse is 78. Her respiration is 18 and oxygen saturation is 96%.        Wt Readings from Last 3 Encounters:   11/15/22 125 lb 3.5 oz (56.8 kg)   11/10/22 123 lb (55.8 kg)       I/O (24 Hours)    Intake/Output Summary (Last 24 hours) at 2022 1109  Last data filed at 2022 0443  Gross per 24 hour   Intake 805.3 ml   Output --   Net 805.3 ml       General Appearance  Awake, alert, oriented,  not  In acute distress  HEENT - normocephalic, atraumatic, slighlty pale  conjunctiva,  anicteric sclera  Neck - Supple, no mass  Lungs -  Bilateral   air entry, +rhonchi, no wheeze  Cardiovascular - Heart sounds are normal.    Abdomen - soft, not distended, nontender,   Neurologic -oriented  Skin - No bruising or bleeding  Extremities - No edema, no cyanosis, clubbing     MEDICATIONS:      tobramycin (PF)  300 mg Nebulization BID    meropenem  500 mg IntraVENous Q8H    rivaroxaban  15 mg Oral Daily    EPINEPHrine  1 mg Endotracheal Once    lidocaine  10 mL Tracheal Tube Once    polyethylene glycol  17 g Oral Daily    docusate sodium  100 mg Oral BID    calcium elemental  500 mg Oral Daily    ferrous sulfate  325 mg Oral BID    montelukast  10 mg Oral Nightly    pantoprazole  40 mg Oral Daily    pravastatin  40 mg Oral Nightly sotalol  80 mg Oral BID    acyclovir  400 mg Oral TID    valsartan  320 mg Oral Daily    sodium chloride flush  5-40 mL IntraVENous 2 times per day    fluticasone  1 puff Inhalation BID      sodium chloride       bisacodyl, sodium chloride flush, sodium chloride      LABS:     CBC:   Recent Labs     11/16/22  0547   WBC 8.1   HGB 10.5*        BMP:    Recent Labs     11/14/22  2350      K 4.3      CO2 26   BUN 15   CREATININE 0.8   GLUCOSE 95     Calcium:  Recent Labs     11/14/22  2350   CALCIUM 8.3*      CULTURES:   UA: No results for input(s): Krysta Lucia, COLORU, CLARITYU, MUCUS, PROTEINU, BLOODU, RBCUA, WBCUA, BACTERIA, NITRU, GLUCOSEU, BILIRUBINUR, UROBILINOGEN, KETUA, LABCAST, LABCASTTY, AMORPHOS in the last 72 hours.     Invalid input(s): CRYSTALS  Micro: No results found for: BC       Problem list of patient:     Patient Active Problem List   Diagnosis Code    Hemoptysis R04.2    Paroxysmal atrial fibrillation (HCC) I48.0    Cavitary lesion of lung J98.4    Primary hypertension I10    Chronic a-fib (HCC) I48.20    Abnormal CT of the chest R93.89    Moderate persistent asthma without complication Y86.60         ASSESSMENT/PLAN   Pneumonia  AFB negative  Molecular panel positive of pseudomonas and MSSA  Will continue meropenem as she is tolerating well      Chris Wiseman MD, MD, FACP 11/17/2022 11:09 AM

## 2022-11-17 NOTE — PROGRESS NOTES
Patient - Christen Andrade,  Age - 80 y.o.    - 1932      Room Number - 5K-011-A   Consulting - Jose Rob PA-C Primary Care Physician - Perla Bradshaw MD   MRN -  [id number]   Alejandrina # - [de-identified]  Date of Admission -  11/10/2022  8:38 PM  Hospital Day - 7    Reason for consult   Chronic cough with intermittent hemoptysis. HPI   Christen Andrade is a 80 y.o. female with PMHx of GERD, recurrent shingles, HLD, HTN, paroxysmal A. fib, Hx of LBBB, asthma who presents to [facility] on 11/10 for symptoms of chronic cough and hemoptysis. Patient has had this cough for minimum of 18 months. She was recently diagnosed with COVID on 10/29/2022 and was hospitalized at [facility], treated with Decadron 6 mg, antibiotic course, and baricitinib. Patient also reports of intermittent hemoptysis which is mild and infrequent. Was seen in the office of  on 11/10 and was transferred to inpatient services. Patient has been treated with several courses of antibiotic by PCP for cough with minimal improvement. 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 Previous chest x-rays were negative. CXR done at [facility] on 10/28 shows slight improvement in the right perihilar infiltrate residual infiltrates persist.  CTA chest on 10/28 showed patchy right middle lobe and right lower lobe airspace opacities some of which are nodular in morphology. There are trace pleural effusions. There is a cavitary 2.0 x 2.8 cm noncalcified right lower lobe pulmonary nodule as well as additional noncavitary noncalcified nodular densities in the right lower lobe that are associated with interstitial prominence and bronchial wall thickening. Findings are likely infectious in etiology. Sequela of old granulomatous disease. Subcentimeter short axis dimension mediastinal and right hilar lymph nodes.     Past 24 hours, ROS   -On RA  -Reports ongoing cough with intermittent production  -Resp culture non-fermenting gram negative bacilli  -PF cytology negative for malignancy,  Cellular degeneration and acute inflammation  All other systems reviewed  Objective    Vitals    height is 5' 3\" (1.6 m) and weight is 125 lb 3.5 oz (56.8 kg). Her oral temperature is 98.1 °F (36.7 °C). Her blood pressure is 138/63 and her pulse is 78. Her respiration is 18 and oxygen saturation is 96%. O2 Flow Rate (L/min): 2 L/min  I/O    Intake/Output Summary (Last 24 hours) at 11/17/2022 1316  Last data filed at 11/17/2022 0443  Gross per 24 hour   Intake 805.3 ml   Output --   Net 805.3 ml       Patient Vitals for the past 96 hrs (Last 3 readings):   Weight   11/15/22 0530 125 lb 3.5 oz (56.8 kg)       Exam    Physical Exam   Constitutional: No distress on RA. Patient appears moderately built and  moderately nourished. Head: Normocephalic and atraumatic. Mouth/Throat: Oropharynx is clear and moist.  No oral thrush. Eyes: Conjunctivae are normal. Pupils are equal, round. No scleral icterus. Neck: Neck supple. No tracheal deviation present. Cardiovascular: S1 and S2 with no murmur. No peripheral edema  Pulmonary/Chest: Normal effort with bilateral air entry, clear breath sounds. No stridor. No respiratory distress. Patient exhibits no tenderness. Abdominal: Soft. Bowel sounds audible. No distension or tenderness to palp. Musculoskeletal: Moves all extremities  Neurological: Patient is alert and oriented to person, place, and time.    Meds       tobramycin (PF)  300 mg Nebulization BID    meropenem  500 mg IntraVENous Q8H    rivaroxaban  15 mg Oral Daily    EPINEPHrine  1 mg Endotracheal Once    lidocaine  10 mL Tracheal Tube Once    polyethylene glycol  17 g Oral Daily    docusate sodium  100 mg Oral BID    calcium elemental  500 mg Oral Daily    ferrous sulfate  325 mg Oral BID    montelukast  10 mg Oral Nightly    pantoprazole  40 mg Oral Daily degeneration and acute inflammation. B. Lung, right lower lobe, posterior, BAL:     No malignant cells seen. Cellular degeneration and acute inflammation. C. Lung, right lower lobe, brushing:     No malignant cells seen. Cellular degeneration and acute inflammation. D. Lung, bronchial washing:     No malignant cells seen. Cellular degeneration and acute inflammation. SOURCE:   A) BRUSHING , BRUSH-RLL       BAL cell count      Latest Reference Range & Units 11/15/22 19:18   BAL Character  CLOUDY/MILKY   BAL Collection Site  RLL   BAL Color  GREGORIO   Pathologist Review  ALONDRA ESTRADA   RBC BAL /cumm 2156   Total Nucleated Cells BAL /cumm 3317   Total Volume Received BAL ml 30       Radiology             Assessment    -Hemoptysis- ? Etiology, no evidence of active bleeding in Bronchoscopy 11/15/2022  -Right lower lobe cavitary lesion  -Chronic cough  -Moderate persistent bronchial asthma  -Recent COVID infection 10/29/2022 treated at 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 Questions and concerns addressed. Meds and Orders reviewed. Electronically signed by   Lavenia Eisenmenger, APRN - CNP on 11/17/2022     Addendum by Dr. Juan Carlos Montgomery MD:  Patient seen by me independently including key components of medical care. Face to face evaluation and examination was performed.  Case discussed with Mr. Alex Aponte, MARILOU  Italicized font, if present,  represents changes to the note made by me. More than 50% of the encounter time involved with patient care by the Pulmonary & Critical care service team spent by me . Please see my modifications mentioned below:  BAL and bronc washings specimen dated 15 November 2022-negative for acid-fast bacilli smear. She is on room air. Plan: Will discontinue respiratory isolation  Continue antibiotics per ID service  Follow definite ID of bronc cultures.     Electronically signed by   Joey Dorman MD on 11/17/2022 at 5:20 PM

## 2022-11-17 NOTE — CARE COORDINATION
11/17/22, 10:56 AM EST    DISCHARGE PLANNING EVALUATION    Call to [facility], spoke with Kristen Watson, updated on anticipating discharge home tomorrow. They asked to have AVS faxed to [phone].

## 2022-11-18 LAB
MISC. #1 REFERENCE GROUP TEST: NORMAL
MISC. #1 REFERENCE GROUP TEST: NORMAL

## 2022-11-18 PROCEDURE — 2580000003 HC RX 258

## 2022-11-18 PROCEDURE — 1200000000 HC SEMI PRIVATE

## 2022-11-18 PROCEDURE — 6370000000 HC RX 637 (ALT 250 FOR IP)

## 2022-11-18 PROCEDURE — 94640 AIRWAY INHALATION TREATMENT: CPT

## 2022-11-18 PROCEDURE — 99232 SBSQ HOSP IP/OBS MODERATE 35: CPT | Performed by: PHYSICIAN ASSISTANT

## 2022-11-18 PROCEDURE — 94760 N-INVAS EAR/PLS OXIMETRY 1: CPT

## 2022-11-18 PROCEDURE — 6360000002 HC RX W HCPCS: Performed by: INTERNAL MEDICINE

## 2022-11-18 PROCEDURE — 6370000000 HC RX 637 (ALT 250 FOR IP): Performed by: PHYSICIAN ASSISTANT

## 2022-11-18 PROCEDURE — 99232 SBSQ HOSP IP/OBS MODERATE 35: CPT | Performed by: INTERNAL MEDICINE

## 2022-11-18 PROCEDURE — 6370000000 HC RX 637 (ALT 250 FOR IP): Performed by: NURSE PRACTITIONER

## 2022-11-18 PROCEDURE — 2580000003 HC RX 258: Performed by: INTERNAL MEDICINE

## 2022-11-18 RX ADMIN — MONTELUKAST SODIUM 10 MG: 10 TABLET ORAL at 20:36

## 2022-11-18 RX ADMIN — DOCUSATE SODIUM 100 MG: 100 CAPSULE, LIQUID FILLED ORAL at 20:37

## 2022-11-18 RX ADMIN — VALSARTAN 320 MG: 320 TABLET, FILM COATED ORAL at 09:28

## 2022-11-18 RX ADMIN — FERROUS SULFATE TAB 325 MG (65 MG ELEMENTAL FE) 325 MG: 325 (65 FE) TAB at 17:21

## 2022-11-18 RX ADMIN — MEROPENEM 500 MG: 500 INJECTION, POWDER, FOR SOLUTION INTRAVENOUS at 09:31

## 2022-11-18 RX ADMIN — MEROPENEM 500 MG: 500 INJECTION, POWDER, FOR SOLUTION INTRAVENOUS at 02:07

## 2022-11-18 RX ADMIN — SOTALOL HYDROCHLORIDE 80 MG: 80 TABLET ORAL at 20:36

## 2022-11-18 RX ADMIN — FLUTICASONE PROPIONATE 1 PUFF: 110 AEROSOL, METERED RESPIRATORY (INHALATION) at 07:31

## 2022-11-18 RX ADMIN — FLUTICASONE PROPIONATE 1 PUFF: 110 AEROSOL, METERED RESPIRATORY (INHALATION) at 20:48

## 2022-11-18 RX ADMIN — ACYCLOVIR 400 MG: 400 TABLET ORAL at 13:55

## 2022-11-18 RX ADMIN — Medication 300 MG: at 07:32

## 2022-11-18 RX ADMIN — RIVAROXABAN 15 MG: 20 TABLET, FILM COATED ORAL at 17:20

## 2022-11-18 RX ADMIN — CALCIUM 500 MG: 500 TABLET ORAL at 09:28

## 2022-11-18 RX ADMIN — ACYCLOVIR 400 MG: 400 TABLET ORAL at 20:37

## 2022-11-18 RX ADMIN — ACYCLOVIR 400 MG: 400 TABLET ORAL at 09:28

## 2022-11-18 RX ADMIN — Medication 300 MG: at 20:37

## 2022-11-18 RX ADMIN — PRAVASTATIN SODIUM 40 MG: 40 TABLET ORAL at 20:36

## 2022-11-18 RX ADMIN — SODIUM CHLORIDE, PRESERVATIVE FREE 10 ML: 5 INJECTION INTRAVENOUS at 20:39

## 2022-11-18 RX ADMIN — SOTALOL HYDROCHLORIDE 80 MG: 80 TABLET ORAL at 09:28

## 2022-11-18 RX ADMIN — FERROUS SULFATE TAB 325 MG (65 MG ELEMENTAL FE) 325 MG: 325 (65 FE) TAB at 09:28

## 2022-11-18 RX ADMIN — PANTOPRAZOLE SODIUM 40 MG: 40 TABLET, DELAYED RELEASE ORAL at 06:42

## 2022-11-18 RX ADMIN — MEROPENEM 500 MG: 500 INJECTION, POWDER, FOR SOLUTION INTRAVENOUS at 17:18

## 2022-11-18 ASSESSMENT — PAIN SCALES - GENERAL: PAINLEVEL_OUTOF10: 0

## 2022-11-18 NOTE — CARE COORDINATION
11/18/22, 2:02 PM EST    DISCHARGE ON GOING EVALUATION    [facility] day: 8  [facility] Reason for admit: Tuberculosis [A15.9]  Hemoptysis [R04.2]   Procedure:   11/15/2022 BRONCHOSCOPY, BIOPSY, FLUROSCOPY  Barriers to Discharge: Pulmonology and ID following, T/B workup negative, oral Zovirax, Merrem q 8 hr., Xarelto, tobramycin nebulizer, low sodium diet, up with assistance. PCP: Marbin Sanchez MD  Readmission Risk Score: 11.2%  Patient Goals/Plan/Treatment Preferences: Yulissa Mondragon is from [place] [address] and current with [facility]. New placement at [facility] for IV antibiotic therapy.

## 2022-11-18 NOTE — PROGRESS NOTES
Progress note: Infectious diseases    Patient - Aly Sky,  Age - 80 y.o.    - 1932      Room Number - 5K-11011-A   MRN -  [id number]   Acct # - [de-identified]  Date of Admission -  11/10/2022  8:38 PM    SUBJECTIVE:   No new issues, her breathing is better, no fever or chills. OBJECTIVE   VITALS    height is 5' 3\" (1.6 m) and weight is 125 lb 3.5 oz (56.8 kg). Her oral temperature is 97.7 °F (36.5 °C). Her blood pressure is 144/49 (abnormal) and her pulse is 60. Her respiration is 18 and oxygen saturation is 99%.        Wt Readings from Last 3 Encounters:   11/15/22 125 lb 3.5 oz (56.8 kg)   11/10/22 123 lb (55.8 kg)       I/O (24 Hours)    Intake/Output Summary (Last 24 hours) at 2022 1525  Last data filed at 2022 1300  Gross per 24 hour   Intake 580 ml   Output --   Net 580 ml         General Appearance  Awake, alert, oriented,  not  In acute distress  HEENT - normocephalic, atraumatic, slighlty pale  conjunctiva,  anicteric sclera  Neck - Supple, no mass  Lungs -  Bilateral   air entry, +rhonchi, no wheeze  Cardiovascular - Heart sounds are normal.    Abdomen - soft, not distended, nontender,   Neurologic -oriented  Skin - No bruising or bleeding  Extremities - No edema, no cyanosis, clubbing     MEDICATIONS:      tobramycin (PF)  300 mg Nebulization BID    meropenem  500 mg IntraVENous Q8H    rivaroxaban  15 mg Oral Daily    polyethylene glycol  17 g Oral Daily    docusate sodium  100 mg Oral BID    calcium elemental  500 mg Oral Daily    ferrous sulfate  325 mg Oral BID    montelukast  10 mg Oral Nightly    pantoprazole  40 mg Oral Daily    pravastatin  40 mg Oral Nightly    sotalol  80 mg Oral BID    acyclovir  400 mg Oral TID    valsartan  320 mg Oral Daily    sodium chloride flush  5-40 mL IntraVENous 2 times per day    fluticasone  1 puff Inhalation BID      sodium chloride       bisacodyl, sodium chloride flush, sodium chloride      LABS:     CBC:   Recent Labs     11/16/22  0547   WBC 8.1   HGB 10.5*              CULTURES:   UA: No results for input(s): SPECGRAV, PHUR, COLORU, CLARITYU, MUCUS, PROTEINU, BLOODU, RBCUA, WBCUA, BACTERIA, NITRU, GLUCOSEU, BILIRUBINUR, UROBILINOGEN, KETUA, LABCAST, LABCASTTY, AMORPHOS in the last 72 hours.     Invalid input(s): CRYSTALS  Micro: No results found for: BC       Problem list of patient:     Patient Active Problem List   Diagnosis Code    Hemoptysis R04.2    Paroxysmal atrial fibrillation (HCC) I48.0    Cavitary lesion of lung J98.4    Primary hypertension I10    Chronic a-fib (HCC) I48.20    Abnormal CT of the chest R93.89    Moderate persistent asthma without complication B12.76         ASSESSMENT/PLAN   Pneumonia  AFB negative  Molecular panel positive of pseudomonas and MSSA  Will continue meropenem as she is tolerating well and no oral alternative to treat due to her drug allergy      Smita Ewing MD, MD, FACP 11/18/2022 3:25 PM

## 2022-11-18 NOTE — PROGRESS NOTES
Hospitalist Progress Note    Patient:  Paul Devine      Unit/Bed:5K-11/011-A    YOB: 1932    MRN: [id number]       Acct: [de-identified]     PCP: Ramona Resendez MD    Date of Admission: 11/10/2022    Assessment/Plan:    Psuedomonas PNA- POA:   CT chest with RML RLL airspace opacities and cavitary 2.0 x 2.8 cm noncalcified right lower lobe pulmonary nodule as well as additional noncavitary noncalcified nodular densities at the right lower lobe they are associated with interstitial prominence and bronchial wall thickening. 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 AFB testing NEGATIVE    History of recent COVID PNA infection. Pulm following. Completed bronchoscopy 11/15  Respiratory panel positive for pseudomonas and staph aureus- cultures confirming pseudomonas- C and S sensitive to meropenem which was started 11/16- continue on discharge for 7 days- SNF is willing to administer  ID following     Constipation (resolved):  Miralax daily, Colace BID. PRN Dulcolax PO. Paroxysmal Atrial Fibrillation, rate controlled:  S/P Ablation 2016. [id number] Score 4, HAS-BLED Score 2. 11/10 EKG NSR. On Xarelto    Primary HTN:  BP stable. Continue satalol and valsartan with hold parameters. Normocytic anemia- hemoglobin stable    Hyperlipidemia:   Continue pravastatin. Recurrent shingles:  No current rash. Continue acyclovir. GERD:  Continue pantoprazole. Asthma:   No acute exacerbation. Continue montelukast, fluticasone. History of LBBB:  11/10 EKG NSR      DISPOSITION: SNF at [facility] started 56/59        Chief Complaint: hemoptysis, chronic cough    Hospital Course: The patient is a 80 y.o. female who presents with complaints of chronic cough and hemoptysis. Patient reports that she has had a chronic cough ongoing for greater than than 18 months.   She states due to her primary doctor is retiring as well as COVID she has been unable to have good follow-up on this problem. She also reports intermittent hemoptysis although it is mild and not frequent. Patient had referral to pulmonology today and was seen in the office of Dr. Douglas Urena. Patient diagnosed with COVID 19 at the end of October and had a CT chest done at that time which showed a lesion in the right lower lobe. Patient does report that this lesion has been seen on previous CT scans although those records are not available in the chart currently. 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 She has never underwent treatment for latent TB infection and chest x-ray previously were negative. Dr. Jesus Butler for diagnostic bronchoscopy to further evaluate right lower lobe cavity pneumonia and hemoptysis. Concerned due to cavitary lesion for possible tuberculosis along with hemoptysis. Patient denies any shortness of breath, chest pain, lightheadedness or dizziness, nausea/vomiting, or abdominal pain. She denies any fever or feeling unwell.     11/16  Patient overall doing well. Dr Robin Leventhal in room as well during exam.   Plan is to wait for AFB to result and then patient can be discharged. Continue with merropenem until then. Per Dr Robin Leventhal- only need result on one AFB from bronch to rule out TB   VSS  Labs stable    11/17  AFB negative.    Continue with meropenem- Abx therapy difficult given allergy history   VSS  Patient and son agreeable to SNF for short term for IV Abx    11/18  Will be able to continue meropenem on discharge to SNF   Waiting for precert  Patient is stable     Subjective:     Medications:  Reviewed    Infusion Medications    sodium chloride       Scheduled Medications    tobramycin (PF)  300 mg Nebulization BID    meropenem  500 mg IntraVENous Q8H    rivaroxaban  15 mg Oral Daily    polyethylene glycol  17 g Oral Daily    docusate sodium  100 mg Oral BID    calcium elemental  500 mg Oral Daily ferrous sulfate  325 mg Oral BID    montelukast  10 mg Oral Nightly    pantoprazole  40 mg Oral Daily    pravastatin  40 mg Oral Nightly    sotalol  80 mg Oral BID    acyclovir  400 mg Oral TID    valsartan  320 mg Oral Daily    sodium chloride flush  5-40 mL IntraVENous 2 times per day    fluticasone  1 puff Inhalation BID     PRN Meds: bisacodyl, sodium chloride flush, sodium chloride      Intake/Output Summary (Last 24 hours) at 11/18/2022 1254  Last data filed at 11/17/2022 1537  Gross per 24 hour   Intake 220 ml   Output --   Net 220 ml         Diet:  ADULT DIET; Regular; Low Sodium (2 gm)    Exam:  BP (!) 159/53   Pulse 69   Temp 97.7 °F (36.5 °C) (Oral)   Resp 16   Ht 5' 3\" (1.6 m)   Wt 125 lb 3.5 oz (56.8 kg)   SpO2 98%   BMI 22.18 kg/m²     General appearance: No apparent distress, appears stated age and cooperative. HEENT: Pupils equal, round, and reactive to light. Conjunctivae/corneas clear. Neck: Supple, with full range of motion. No jugular venous distention. Trachea midline. Respiratory:  Normal respiratory effort. Clear to auscultation, bilaterally without Rales/Wheezes/Rhonchi. Cardiovascular: Regular rate and rhythm with normal S1/S2 without murmurs, rubs or gallops. Abdomen: Soft, non-tender, non-distended with normal bowel sounds. Musculoskeletal: passive and active ROM x 4 extremities. Skin: Skin color, texture, turgor normal.  No rashes or lesions. Neurologic:  Neurovascularly intact without any focal sensory/motor deficits. Psychiatric: Alert and oriented, thought content appropriate, normal insight. Capillary Refill: Brisk,< 3 seconds   Peripheral Pulses: +2 palpable, equal bilaterally       Labs:   Recent Labs     11/16/22  0547   WBC 8.1   HGB 10.5*   HCT 32.4*          No results for input(s): NA, K, CL, CO2, BUN, CREATININE, CALCIUM, PHOS in the last 72 hours.     Invalid input(s): MAGNES    No results for input(s): AST, ALT, BILIDIR, BILITOT, ALKPHOS in the last 72 hours. No results for input(s): INR in the last 72 hours. No results for input(s): Corky Stallion in the last 72 hours. Urinalysis:    No results found for: Trisha Eddie, BACTERIA, RBCUA, BLOODU, [address], [address]    Radiology:  XR CHEST 1 VIEW   Final Result   Intraoperative appearance of the chest.      Final report electronically signed by Dr. Anh Warner on 11/15/2022 4:33 PM      FLUORO FOR SURGICAL PROCEDURES   Final Result      CT CHEST WO CONTRAST    (Results Pending)       Diet: ADULT DIET; Regular;  Low Sodium (2 gm)    DVT prophylaxis: [] Lovenox                                 [] SCDs                                 [] SQ Heparin                                 [] Encourage ambulation           [x] Already on Anticoagulation     Disposition:    [] Home       [] TCU       [] Rehab       [] Psych       [x] SNF       [] [place]       [] Other-    Code Status: Full Code      Electronically signed by Jose Rob PA-C BSN, RN, AG-ACNP Student on 11/18/2022 at 12:54 PM     Jose Rob PA-C 11/15/2022 2322

## 2022-11-18 NOTE — PLAN OF CARE
Problem: Respiratory - Adult  Goal: Clear lung sounds  11/18/2022 0737 by Idalia Lara RCP  Outcome: Progressing  Continue therapy as ordered to achieve and maintain clear breath sounds and improve aeration. Pt agrees with the plan.

## 2022-11-18 NOTE — PLAN OF CARE
Problem: Safety - Adult  Goal: Free from fall injury  11/18/2022 0151 by Carmelita Du RN  Outcome: Progressing  11/17/2022 1458 by Duy Cordova RN  Outcome: Progressing  Note: Use of call light appropriately     Problem: ABCDS Injury Assessment  Goal: Absence of physical injury  11/18/2022 0151 by Carmelita Du RN  Outcome: Progressing  11/17/2022 1458 by Duy Cordova RN  Outcome: Progressing     Problem: Respiratory - Adult  Goal: Clear lung sounds  11/18/2022 0151 by Carmelita Du RN  Outcome: Progressing  11/17/2022 1458 by Duy Cordova RN  Outcome: Progressing  Note: Order for O2, intermittent pulse ox check throughout the day     Problem: Skin/Tissue Integrity - Adult  Goal: Skin integrity remains intact  11/18/2022 0151 by Carmelita uD RN  Outcome: Progressing  11/17/2022 1458 by Duy Cordova RN  Outcome: Progressing     Problem: Infection - Adult  Goal: Absence of infection at discharge  11/18/2022 0151 by Carmelita Du RN  Outcome: Progressing  11/17/2022 1458 by Duy Cordova RN  Outcome: Progressing  Goal: Isolation precautions  Description: Isolation precautions- airborne  11/18/2022 0151 by Carmelita Du RN  Outcome: Progressing  11/17/2022 1458 by Duy Cordova RN  Outcome: Progressing     Problem: Discharge Planning  Goal: Discharge to home or other facility with appropriate resources  11/18/2022 0151 by Carmelita Du RN  Outcome: Progressing  11/17/2022 1458 by Duy Cordova RN  Outcome: Progressing   Care plan reviewed with patient. Patient verbalizes understanding of the plan of care and contributes to goal setting.

## 2022-11-18 NOTE — PLAN OF CARE
Problem: Safety - Adult  Goal: Free from fall injury  11/18/2022 0158 by Chastity Duong, RN  Outcome: Progressing  11/18/2022 0151 by Chastity Duong, RN  Outcome: Progressing  11/17/2022 1458 by Dany Prajapati RN  Outcome: Progressing  Note: Use of call light appropriately     Problem: ABCDS Injury Assessment  Goal: Absence of physical injury  11/18/2022 0158 by Chastity Duong, RN  Outcome: Progressing  11/18/2022 0151 by Chastity Duong, RN  Outcome: Progressing  11/17/2022 1458 by Dany Prajapati RN  Outcome: Progressing     Problem: Respiratory - Adult  Goal: Clear lung sounds  11/18/2022 0158 by Chastity Duong, RN  Outcome: Progressing  11/18/2022 0151 by Chastity Duong, RN  Outcome: Progressing  11/17/2022 1458 by Dany Prajapati RN  Outcome: Progressing  Note: Order for O2, intermittent pulse ox check throughout the day     Problem: Skin/Tissue Integrity - Adult  Goal: Skin integrity remains intact  11/18/2022 0158 by Chastity Duong, RN  Outcome: Progressing  11/18/2022 0151 by Chastity Duong, RN  Outcome: Progressing  11/17/2022 1458 by Dany Prajapati RN  Outcome: Progressing     Problem: Infection - Adult  Goal: Absence of infection at discharge  11/18/2022 0158 by Chastity Duong, RN  Outcome: Progressing  11/18/2022 0151 by Chastity Duong, RN  Outcome: Progressing  11/17/2022 1458 by Dany Prajapati RN  Outcome: Progressing  Goal: Isolation precautions  Description: Isolation precautions- airborne  11/18/2022 0158 by Chastity Duong, RN  Outcome: Progressing  11/18/2022 0151 by Chastity Duong, RN  Outcome: Progressing  11/17/2022 1458 by Dany Prajapati RN  Outcome: Progressing     Problem: Discharge Planning  Goal: Discharge to home or other facility with appropriate resources  11/18/2022 0158 by Chastity Duong, RN  Outcome: Progressing  11/18/2022 0151 by Chastity Duong RN  Outcome: Progressing  11/17/2022 1458 by Dany Prajapati RN  Outcome: Progressing   Care plan reviewed with patient. Patient verbalizes understanding of the plan of care and contributes to goal setting.

## 2022-11-18 NOTE — CARE COORDINATION
11/18/22, 8:01 AM EST    DISCHARGE PLANNING EVALUATION    Order for DAVID for pt agreeable to SNF side of [facility] for IV antibiotics. Call to [facility], left a voicemail for admissions regarding pt needing SNF for IVAB. 8:48 AM  Call from [facility] with Sylvester Marksstraphael, referral made, clinicals faxed to [phone], they will get back with DAVID after review. 11:53 AM  Call to [facility] with [facility], they can do the IVAB, she started precert, she will let DAVID know when this is back. David met with pt and provided updated. She asked that SW call son Yvonne Rivera and update him. DAVID did call Yvonne Rivera, provided updated. He let SW know family may be able to transport depending on when discharge is, he asked to be kept up to day on this. DAVID did discuss ambulette transport if family cannot transport and potential cost for this.

## 2022-11-18 NOTE — PROGRESS NOTES
Patient - Aden Weeks,  Age - 80 y.o.    - 1932      Room Number - 5K-011-A   Consulting - Latoya Pickering PA-C Primary Care Physician - Jacoby Faulkner MD   MRN -  [id number]   Alejandrina # - [de-identified]  Date of Admission -  11/10/2022  8:38 PM  Hospital Day - 8    Reason for consult   Chronic cough with intermittent hemoptysis  HPI   Aden Weeks is a 80 y.o. female with PMHx of GERD, recurrent shingles, HLD, HTN, paroxysmal A. fib, Hx of LBBB, asthma who presents to [facility] on 11/10 for symptoms of chronic cough and hemoptysis. Patient has had this cough for minimum of 18 months. She was recently diagnosed with COVID on 10/29/2022 and was hospitalized at [facility], treated with Decadron 6 mg, antibiotic course, and baricitinib. Patient also reports of intermittent hemoptysis which is mild and infrequent. Was seen in the office of  on 11/10 and was transferred to inpatient services. Patient has been treated with several courses of antibiotic by PCP for cough with minimal improvement. 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 Previous chest x-rays were negative. CXR done at [facility] on 10/28 shows slight improvement in the right perihilar infiltrate residual infiltrates persist.  CTA chest on 10/28 showed patchy right middle lobe and right lower lobe airspace opacities some of which are nodular in morphology. There are trace pleural effusions. There is a cavitary 2.0 x 2.8 cm noncalcified right lower lobe pulmonary nodule as well as additional noncavitary noncalcified nodular densities in the right lower lobe that are associated with interstitial prominence and bronchial wall thickening. Findings are likely infectious in etiology. Sequela of old granulomatous disease. Subcentimeter short axis dimension mediastinal and right hilar lymph nodes.     Past 24 hours, ROS   -On RA  -denies reaction to Merrem, no s/s of anaphylaxis GI upset or skin reaction  -No further hemoptysis  -Still has some cough but feels is improving  All other systems reviewed  Objective    Vitals    height is 5' 3\" (1.6 m) and weight is 125 lb 3.5 oz (56.8 kg). Her oral temperature is 97.7 °F (36.5 °C). Her blood pressure is 159/53 (abnormal) and her pulse is 69. Her respiration is 16 and oxygen saturation is 98%. O2 Flow Rate (L/min): 2 L/min  I/O    Intake/Output Summary (Last 24 hours) at 11/18/2022 1358  Last data filed at 11/17/2022 1537  Gross per 24 hour   Intake 220 ml   Output --   Net 220 ml       Patient Vitals for the past 96 hrs (Last 3 readings):   Weight   11/15/22 0530 125 lb 3.5 oz (56.8 kg)       Exam    Physical Exam   Constitutional: No distress on RA. Patient appears moderately built and  moderately nourished. Head: Normocephalic and atraumatic. Mouth/Throat: Oropharynx is clear and moist.  No oral thrush. Eyes: Conjunctivae are normal. Pupils are equal, round. No scleral icterus. Neck: Neck supple. No tracheal deviation present. Cardiovascular: S1 and S2 with no murmur. No peripheral edema  Pulmonary/Chest: Normal effort with bilateral air entry, clear breath sounds. No stridor. No respiratory distress. Patient exhibits no tenderness. Abdominal: Soft. Bowel sounds audible. No distension or tenderness to palp. Musculoskeletal: Moves all extremities  Neurological: Patient is alert and oriented to person, place, and time.    Meds       tobramycin (PF)  300 mg Nebulization BID    meropenem  500 mg IntraVENous Q8H    rivaroxaban  15 mg Oral Daily    polyethylene glycol  17 g Oral Daily    docusate sodium  100 mg Oral BID    calcium elemental  500 mg Oral Daily    ferrous sulfate  325 mg Oral BID    montelukast  10 mg Oral Nightly    pantoprazole  40 mg Oral Daily    pravastatin  40 mg Oral Nightly    sotalol  80 mg Oral BID    acyclovir  400 mg Oral TID    valsartan  320 mg Oral Daily sodium chloride flush  5-40 mL IntraVENous 2 times per day    fluticasone  1 puff Inhalation BID      sodium chloride       bisacodyl, sodium chloride flush, sodium chloride  Labs   ABG  No results found for: PH, PO2, PCO2, HCO3, O2SAT  No results found for: IFIO2, MODE, SETTIDVOL, SETPEEP  CBC  Recent Labs     11/16/22  0547   WBC 8.1   RBC 3.62*   HGB 10.5*   HCT 32.4*   MCV 89.5   MCH 29.0   MCHC 32.4      MPV 9.3*        BMP  No results for input(s): NA, K, CL, CO2, BUN, CREATININE, GLUCOSE, MG, PHOS, CALCIUM, IONCA, MG in the last 72 hours. LFT  No results for input(s): AST, ALT, ALB, BILITOT, ALKPHOS, LIPASE in the last 72 hours. Invalid input(s): AMYLASE    TROP  No results found for: TROPONINT  BNP  No results found for: PROBNP  D-Dimer  No results found for: DDIMER  Lactic Acid  No results for input(s): LACTA in the last 72 hours. INR  No results for input(s): INR, PROTIME in the last 72 hours. PTT  No results for input(s): APTT in the last 72 hours. Glucose  No results for input(s): POCGLU in the last 72 hours. UA No results for input(s): SPECGRAV, PHUR, COLORU, CLARITYU, MUCUS, PROTEINU, BLOODU, RBCUA, WBCUA, BACTERIA, NITRU, GLUCOSEU, BILIRUBINUR, UROBILINOGEN, KETUA, LABCAST, LABCASTTY, AMORPHOS in the last 72 hours. Invalid input(s): CRYSTALS. Echo   None in epic  Cultures    Procalcitonin   No results found for: PROCAL    Bronchoscopy 11/15/2022  By Dr. Raul Vasquez culture-no prelim growth final pending  PNA panel PCR-positive for Pseudomonas aeruginosa and staph aureus  Viral respiratory panel-pending  AFB culture with smear-None on concentrated smear Final pending  Cytology-    FINAL RESULTS:     A.  Lung, right lower lobe, brushing:       No malignant cells seen. Cellular degeneration and acute inflammation. B. Lung, right lower lobe, posterior, BAL:     No malignant cells seen.      Cellular degeneration and acute inflammation. C. Lung, right lower lobe, brushing:     No malignant cells seen. Cellular degeneration and acute inflammation. D. Lung, bronchial washing:     No malignant cells seen. Cellular degeneration and acute inflammation. SOURCE:   A) BRUSHING , BRUSH-RLL       BAL cell count      Latest Reference Range & Units 11/15/22 19:18   BAL Character  CLOUDY/MILKY   BAL Collection Site  RLL   BAL Color  GREGORIO   Pathologist Review  ALONDRA ESTRADA   RBC BAL /cumm 2156   Total Nucleated Cells BAL /cumm 3317   Total Volume Received BAL ml 30       Radiology             Assessment    -Hemoptysis- ? 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  -Paroxysmal atrial fibrillation status post ablation in 2016 on anticoagulation  -History of left bundle branch block  -Hx bronchial asthma on ICS and LIBRADO per PCP    Plan   -Resp Cx Pseudomonas ID following on Merrem  -AFB negative on concentrated smear, removed from airbourne isolation per ID  -ID consult appreciated multiple allergies PNA PCR + for pseudomonas and staph   -Merrem  mg TID for 8 days  -Tobramycin nebs 300 mg BID for 7 days  -Continue home inhalers  -DVT prophylaxis xarelto  -Stable Pulmonary status planning dispo to ECF   -Follow-up in clinic in 3 months with CT chest 2 days prior with Dr. Livan Sneed    Case discussed with nurse and patient/family. Questions and concerns addressed. Meds and Orders reviewed. Electronically signed by   SUZANNE Paredes - CNP on 11/18/2022     Addendum by Dr. Livan Sneed MD:  Patient seen by me independently including key components of medical care. Face to face evaluation and examination was performed.  Case discussed with Mr. Reshma Bae CNP  Italicized font, if present,  represents changes to the note made by me. More than 50% of the encounter time involved with patient care by the Pulmonary & Critical care service team spent by me . Please see my modifications mentioned below:  She is on room air  Improving cough  Tolerating antibiotics well  Follow-up as able  Discharge plan per ID service   Aden Favorite educated about my impression and plan. She verbalizes understanding.     Electronically signed by   Karen Gruber MD on 11/18/2022 at 6:25 PM

## 2022-11-19 ENCOUNTER — APPOINTMENT (OUTPATIENT)
Dept: GENERAL RADIOLOGY | Age: 87
DRG: 178 | End: 2022-11-19
Attending: INTERNAL MEDICINE
Payer: MEDICARE

## 2022-11-19 LAB
ANION GAP SERPL CALCULATED.3IONS-SCNC: 9 MEQ/L (ref 8–16)
BUN BLDV-MCNC: 9 MG/DL (ref 7–22)
CALCIUM SERPL-MCNC: 9.2 MG/DL (ref 8.5–10.5)
CHLORIDE BLD-SCNC: 101 MEQ/L (ref 98–111)
CO2: 26 MEQ/L (ref 23–33)
CREAT SERPL-MCNC: 0.5 MG/DL (ref 0.4–1.2)
ERYTHROCYTE [DISTWIDTH] IN BLOOD BY AUTOMATED COUNT: 15.6 % (ref 11.5–14.5)
ERYTHROCYTE [DISTWIDTH] IN BLOOD BY AUTOMATED COUNT: 50 FL (ref 35–45)
GFR SERPL CREATININE-BSD FRML MDRD: > 60 ML/MIN/1.73M2
GLUCOSE BLD-MCNC: 102 MG/DL (ref 70–108)
HCT VFR BLD CALC: 33.4 % (ref 37–47)
HEMOGLOBIN: 10.8 GM/DL (ref 12–16)
HERPES SIMPLEX VIRUS SUBTYPE SOURCE: NORMAL
HSV 1 SUBTYPE BY PCR: NOT DETECTED
HSV 2 SUBTYPE BY PCR: NOT DETECTED
MCH RBC QN AUTO: 29.1 PG (ref 26–33)
MCHC RBC AUTO-ENTMCNC: 32.3 GM/DL (ref 32.2–35.5)
MCV RBC AUTO: 90 FL (ref 81–99)
MISC. #1 REFERENCE GROUP TEST: NORMAL
PLATELET # BLD: 281 THOU/MM3 (ref 130–400)
PMV BLD AUTO: 9 FL (ref 9.4–12.4)
POTASSIUM REFLEX MAGNESIUM: 4.4 MEQ/L (ref 3.5–5.2)
RBC # BLD: 3.71 MILL/MM3 (ref 4.2–5.4)
SODIUM BLD-SCNC: 136 MEQ/L (ref 135–145)
WBC # BLD: 7.8 THOU/MM3 (ref 4.8–10.8)

## 2022-11-19 PROCEDURE — 6370000000 HC RX 637 (ALT 250 FOR IP): Performed by: PHYSICIAN ASSISTANT

## 2022-11-19 PROCEDURE — 94640 AIRWAY INHALATION TREATMENT: CPT

## 2022-11-19 PROCEDURE — 6360000002 HC RX W HCPCS: Performed by: INTERNAL MEDICINE

## 2022-11-19 PROCEDURE — 2580000003 HC RX 258: Performed by: INTERNAL MEDICINE

## 2022-11-19 PROCEDURE — 36415 COLL VENOUS BLD VENIPUNCTURE: CPT

## 2022-11-19 PROCEDURE — 6370000000 HC RX 637 (ALT 250 FOR IP)

## 2022-11-19 PROCEDURE — 1200000000 HC SEMI PRIVATE

## 2022-11-19 PROCEDURE — 76937 US GUIDE VASCULAR ACCESS: CPT

## 2022-11-19 PROCEDURE — 02HV33Z INSERTION OF INFUSION DEVICE INTO SUPERIOR VENA CAVA, PERCUTANEOUS APPROACH: ICD-10-PCS | Performed by: INTERNAL MEDICINE

## 2022-11-19 PROCEDURE — 6370000000 HC RX 637 (ALT 250 FOR IP): Performed by: NURSE PRACTITIONER

## 2022-11-19 PROCEDURE — C1751 CATH, INF, PER/CENT/MIDLINE: HCPCS

## 2022-11-19 PROCEDURE — 80048 BASIC METABOLIC PNL TOTAL CA: CPT

## 2022-11-19 PROCEDURE — 2500000003 HC RX 250 WO HCPCS: Performed by: INTERNAL MEDICINE

## 2022-11-19 PROCEDURE — 71045 X-RAY EXAM CHEST 1 VIEW: CPT

## 2022-11-19 PROCEDURE — 99232 SBSQ HOSP IP/OBS MODERATE 35: CPT | Performed by: INTERNAL MEDICINE

## 2022-11-19 PROCEDURE — 85027 COMPLETE CBC AUTOMATED: CPT

## 2022-11-19 RX ORDER — SODIUM CHLORIDE 0.9 % (FLUSH) 0.9 %
5-40 SYRINGE (ML) INJECTION EVERY 12 HOURS SCHEDULED
Status: DISCONTINUED | OUTPATIENT
Start: 2022-11-19 | End: 2022-11-20 | Stop reason: HOSPADM

## 2022-11-19 RX ORDER — LIDOCAINE HYDROCHLORIDE 10 MG/ML
5 INJECTION, SOLUTION EPIDURAL; INFILTRATION; INTRACAUDAL; PERINEURAL ONCE
Status: COMPLETED | OUTPATIENT
Start: 2022-11-19 | End: 2022-11-19

## 2022-11-19 RX ORDER — MEROPENEM 1 G/1
500 INJECTION, POWDER, FOR SOLUTION INTRAVENOUS EVERY 8 HOURS
Qty: 6 EACH | Refills: 0 | DISCHARGE
Start: 2022-11-19 | End: 2022-11-23

## 2022-11-19 RX ORDER — SODIUM CHLORIDE 9 MG/ML
25 INJECTION, SOLUTION INTRAVENOUS PRN
Status: DISCONTINUED | OUTPATIENT
Start: 2022-11-19 | End: 2022-11-20 | Stop reason: HOSPADM

## 2022-11-19 RX ORDER — SODIUM CHLORIDE 0.9 % (FLUSH) 0.9 %
5-40 SYRINGE (ML) INJECTION PRN
Status: DISCONTINUED | OUTPATIENT
Start: 2022-11-19 | End: 2022-11-20 | Stop reason: HOSPADM

## 2022-11-19 RX ORDER — TOBRAMYCIN INHALATION SOLUTION 300 MG/5ML
300 INHALANT RESPIRATORY (INHALATION) 2 TIMES DAILY
Qty: 40 ML | Refills: 0 | DISCHARGE
Start: 2022-11-19 | End: 2022-11-23

## 2022-11-19 RX ADMIN — Medication 300 MG: at 07:55

## 2022-11-19 RX ADMIN — LIDOCAINE HYDROCHLORIDE 5 ML: 10 INJECTION, SOLUTION EPIDURAL; INFILTRATION; INTRACAUDAL; PERINEURAL at 10:00

## 2022-11-19 RX ADMIN — SOTALOL HYDROCHLORIDE 80 MG: 80 TABLET ORAL at 11:02

## 2022-11-19 RX ADMIN — MEROPENEM 500 MG: 500 INJECTION, POWDER, FOR SOLUTION INTRAVENOUS at 11:04

## 2022-11-19 RX ADMIN — SODIUM CHLORIDE, PRESERVATIVE FREE 10 ML: 5 INJECTION INTRAVENOUS at 20:55

## 2022-11-19 RX ADMIN — PRAVASTATIN SODIUM 40 MG: 40 TABLET ORAL at 20:54

## 2022-11-19 RX ADMIN — Medication 300 MG: at 23:56

## 2022-11-19 RX ADMIN — ACYCLOVIR 400 MG: 400 TABLET ORAL at 20:54

## 2022-11-19 RX ADMIN — FLUTICASONE PROPIONATE 1 PUFF: 110 AEROSOL, METERED RESPIRATORY (INHALATION) at 18:30

## 2022-11-19 RX ADMIN — VALSARTAN 320 MG: 320 TABLET, FILM COATED ORAL at 11:02

## 2022-11-19 RX ADMIN — FERROUS SULFATE TAB 325 MG (65 MG ELEMENTAL FE) 325 MG: 325 (65 FE) TAB at 11:02

## 2022-11-19 RX ADMIN — MEROPENEM 500 MG: 500 INJECTION, POWDER, FOR SOLUTION INTRAVENOUS at 00:12

## 2022-11-19 RX ADMIN — FLUTICASONE PROPIONATE 1 PUFF: 110 AEROSOL, METERED RESPIRATORY (INHALATION) at 07:55

## 2022-11-19 RX ADMIN — CALCIUM 500 MG: 500 TABLET ORAL at 11:02

## 2022-11-19 RX ADMIN — RIVAROXABAN 15 MG: 20 TABLET, FILM COATED ORAL at 17:33

## 2022-11-19 RX ADMIN — POLYETHYLENE GLYCOL 3350 17 G: 17 POWDER, FOR SOLUTION ORAL at 11:02

## 2022-11-19 RX ADMIN — SOTALOL HYDROCHLORIDE 80 MG: 80 TABLET ORAL at 20:54

## 2022-11-19 RX ADMIN — MEROPENEM 500 MG: 500 INJECTION, POWDER, FOR SOLUTION INTRAVENOUS at 17:35

## 2022-11-19 RX ADMIN — FERROUS SULFATE TAB 325 MG (65 MG ELEMENTAL FE) 325 MG: 325 (65 FE) TAB at 17:33

## 2022-11-19 RX ADMIN — PANTOPRAZOLE SODIUM 40 MG: 40 TABLET, DELAYED RELEASE ORAL at 05:50

## 2022-11-19 RX ADMIN — DOCUSATE SODIUM 100 MG: 100 CAPSULE, LIQUID FILLED ORAL at 11:02

## 2022-11-19 RX ADMIN — ACYCLOVIR 400 MG: 400 TABLET ORAL at 15:05

## 2022-11-19 RX ADMIN — ACYCLOVIR 400 MG: 400 TABLET ORAL at 11:02

## 2022-11-19 RX ADMIN — MONTELUKAST SODIUM 10 MG: 10 TABLET ORAL at 20:54

## 2022-11-19 NOTE — PROGRESS NOTES
Abdominal Pain, N/V/D Midline insertion Procedure Note    Ana Sandoval   Admitted- 11/10/2022  8:38 PM  Admission diagnosis- Tuberculosis [A15.9]  Hemoptysis [R04.2]      Attending Physician- Hernando Carbajal MD  Ordering Physician-same  Indication for Insertion: Antibiotic Therapy    Catheter Insertion Date- 11/19/2022   Catheter Brand-BARD   Lot Number- [id number]   Gauge-4  Lumen-single    Insertion Site- RUBINA Basilic  Vein Diameter- 1.78 mm  Catheter Length- 20 cm  Internal Length- 18 cm  Exposed Catheter Length- 2cm   Midline Tip Terminates in the Axillary- Yes  Upper Arm Circumference- 25cm  Easy insertion- Yes  Able to Aspirate blood- Yes  Easy Flush- Yes    Midline insertion successful- Yes  Ultrasound- yes    Okay To Use Midline- Yes    Electronically signed by Se Abraham RN, RN on 11/19/2022 at 3:04 PM

## 2022-11-19 NOTE — PLAN OF CARE
Problem: Respiratory - Adult  Goal: Clear lung sounds  11/19/2022 0759 by Roodlfo Veloz RCP  Outcome: Progressing  11/19/2022 0115 by Asia Jamil RN  Outcome: Progressing   Continue therapy as ordered to achieve and maintain clear breath sounds and improve aeration.

## 2022-11-19 NOTE — CARE COORDINATION
Discharge Planning Update:     Hospital day: 9  Location: [facility]11/011-A     Barriers to Discharge:  Received call that pt's precert has been approved, good through Monday, 11/21/22. Message to physician that need to have PICC line placed prior to discharge. Orders placed. [employer] message to PICC team.     SIGNED:  Jazmyne Ortiz RN   11/19/2022, 9:08 AM      1:31 PM EST  Received call from [place] [facility], family cannot transport until tomorrow. Updated Eyad Y[facility]. Received call from Dr. Byrd Age, pt's PICC line is needing to be adjusted or removed and a midline placed prior to discharge. IV team is aware and will be in Sunday AM to take care of.

## 2022-11-19 NOTE — PLAN OF CARE
Problem: Safety - Adult  Goal: Free from fall injury  Outcome: Progressing   All fall precautions in place. Bed in low position, alarm activated and appropriate use of call light. Problem: Respiratory - Adult  Goal: Clear lung sounds  Outcome: Progressing   Lungs are clear, Rn will monitor. Problem: Skin/Tissue Integrity - Adult  Goal: Skin integrity remains intact  Outcome: Progressing  Flowsheets (Taken 11/18/2022 2227)  Skin Integrity Remains Intact: Monitor for areas of redness and/or skin breakdown   Skin assessment completed. Patient turned every 2 hours and as needed. No skin breakdown this shift. Problem: Infection - Adult  Goal: Absence of infection at discharge  Outcome: Progressing     Problem: Discharge Planning  Goal: Discharge to home or other facility with appropriate resources  Outcome: Progressing   Discharge date is unknown at this time. Plan to go to assisted living in [facility]. Problem: Pain  Goal: Verbalizes/displays adequate comfort level or baseline comfort level  Outcome: Progressing   Pain Assessment: None - Denies Pain  Pain Level: 0       Is pain goal met at this time? Yes      Care plan reviewed with patient. Patient verbalizes understanding of the plan of care and contributes to goal setting.

## 2022-11-19 NOTE — PROGRESS NOTES
Hospitalist Progress Note      Patient:  Flip Hernández    Unit/Bed:5K-11/011-A  YOB: 1932  MRN: [id number]   Acct: [de-identified]     PCP: Harman Solano MD  Date of Admission: 11/10/2022       Assessment/Plan:    Psuedomonas PNA- POA:   CT chest with RML RLL airspace opacities and cavitary 2.0 x 2.8 cm noncalcified right lower lobe pulmonary nodule as well as additional noncavitary noncalcified nodular densities at the right lower lobe they are associated with interstitial prominence and bronchial wall thickening. 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 AFB testing NEGATIVE    History of recent COVID PNA infection. Pulm following. Completed bronchoscopy 11/15  Respiratory panel positive for pseudomonas and staph aureus- cultures confirming pseudomonas- C and S sensitive to meropenem which was started 11/16- continue on discharge for 7 days- SNF is willing to administer  ID following     11/19-planning to continue Merrem for 4 more days as per ID-PICC line inserted as per the request of the nursing home. Plan for discharge tomorrow secondary to transport issues today    Constipation (resolved):  Miralax daily, Colace BID. PRN Dulcolax PO. Paroxysmal Atrial Fibrillation, rate controlled:  S/P Ablation 2016. [id number] Score 4, HAS-BLED Score 2. 11/10 EKG NSR. On Xarelto    Primary HTN:  BP stable. Continue satalol and valsartan with hold parameters. Normocytic anemia- hemoglobin stable    Hyperlipidemia:   Continue pravastatin. Recurrent shingles:  No current rash. Continue acyclovir. GERD:  Continue pantoprazole. Asthma:   No acute exacerbation. Continue montelukast, fluticasone. History of LBBB:  11/10 EKG NSR    Plan for discharge tomorrow to nursing home, has transport issues today  PICC line today as per nursing home request        Chief Complaint: hemoptysis, chronic cough    Hospital Course:  The patient is a 80 y.o. female who presents with complaints of chronic cough and hemoptysis. Patient reports that she has had a chronic cough ongoing for greater than than 18 months. She states due to her primary doctor is retiring as well as COVID she has been unable to have good follow-up on this problem. She also reports intermittent hemoptysis although it is mild and not frequent. Patient had referral to pulmonology today and was seen in the office of Dr. Leida Hunter. Patient diagnosed with COVID 19 at the end of October and had a CT chest done at that time which showed a lesion in the right lower lobe. Patient does report that this lesion has been seen on previous CT scans although those records are not available in the chart currently. 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 She has never underwent treatment for latent TB infection and chest x-ray previously were negative. Dr. Rupal Mackenzie for diagnostic bronchoscopy to further evaluate right lower lobe cavity pneumonia and hemoptysis. Concerned due to cavitary lesion for possible tuberculosis along with hemoptysis. Patient denies any shortness of breath, chest pain, lightheadedness or dizziness, nausea/vomiting, or abdominal pain. She denies any fever or feeling unwell.        Subjective:   No chest pain or shortness of breath no fever no chills planning for discharge tomorrow    Medications:  Reviewed    Infusion Medications    sodium chloride      sodium chloride       Scheduled Medications    lidocaine 1 % injection  5 mL IntraDERmal Once    sodium chloride flush  5-40 mL IntraVENous 2 times per day    tobramycin (PF)  300 mg Nebulization BID    meropenem  500 mg IntraVENous Q8H    rivaroxaban  15 mg Oral Daily    polyethylene glycol  17 g Oral Daily    docusate sodium  100 mg Oral BID    calcium elemental  500 mg Oral Daily    ferrous sulfate  325 mg Oral BID    montelukast  10 mg Oral Nightly pantoprazole  40 mg Oral Daily    pravastatin  40 mg Oral Nightly    sotalol  80 mg Oral BID    acyclovir  400 mg Oral TID    valsartan  320 mg Oral Daily    sodium chloride flush  5-40 mL IntraVENous 2 times per day    fluticasone  1 puff Inhalation BID     PRN Meds: sodium chloride flush, sodium chloride, bisacodyl, sodium chloride flush, sodium chloride      Intake/Output Summary (Last 24 hours) at 11/19/2022 1222  Last data filed at 11/18/2022 1300  Gross per 24 hour   Intake 120 ml   Output --   Net 120 ml         Diet:  ADULT DIET; Regular; Low Sodium (2 gm)    Exam:  BP (!) 141/52   Pulse 67   Temp 98 °F (36.7 °C) (Oral)   Resp 16   Ht 5' 3\" (1.6 m)   Wt 125 lb 3.5 oz (56.8 kg)   SpO2 98%   BMI 22.18 kg/m²     General appearance: No apparent distress, appears stated age and cooperative. HEENT: Pupils equal, round, and reactive to light. Conjunctivae/corneas clear. Neck: Supple, with full range of motion. No jugular venous distention. Trachea midline. Respiratory:  Normal respiratory effort. Clear to auscultation, bilaterally without Rales/Wheezes/Rhonchi. Cardiovascular: Regular rate and rhythm with normal S1/S2 without murmurs, rubs or gallops. Abdomen: Soft, non-tender, non-distended with normal bowel sounds. Musculoskeletal: passive and active ROM x 4 extremities. Skin: Skin color, texture, turgor normal.  No rashes or lesions. Neurologic:  Neurovascularly intact without any focal sensory/motor deficits. Psychiatric: Alert and oriented, thought content appropriate, normal insight. Capillary Refill: Brisk,< 3 seconds   Peripheral Pulses: +2 palpable, equal bilaterally       Labs:   Recent Labs     11/19/22  0820   WBC 7.8   HGB 10.8*   HCT 33.4*          Recent Labs     11/19/22  0820      K 4.4      CO2 26   BUN 9   CREATININE 0.5   CALCIUM 9.2       No results for input(s): AST, ALT, BILIDIR, BILITOT, ALKPHOS in the last 72 hours.     No results for input(s): INR in the last 72 hours. No results for input(s): Anna Ill in the last 72 hours.     Urinalysis:    No results found for: Dinah Babinski, BACTERIA, RBCUA, BLOODU, Valri Mary, Prudence [address]    Radiology:  XR CHEST 1 VIEW   Final Result   Intraoperative appearance of the chest.      Final report electronically signed by Dr. Minerva Sawyer on 11/15/2022 4:33 PM      [facility] FOR SURGICAL PROCEDURES   Final Result      CT CHEST [address]    (Results Pending)   XR CHEST PORTABLE    (Results Pending)         Code Status: Full Code    Electronically signed by Damon Smith MD on 11/19/2022 at 12:23 PM

## 2022-11-19 NOTE — PROGRESS NOTES
Patient - Nishi Skinner,  Age - 80 y.o.    - 1932      Room Number - 5K-011-A   Consulting - George Rios MD Primary Care Physician - Tawana Ocampo MD   MRN -  [id number]   Alejandrina # - [de-identified]  Date of Admission -  11/10/2022  8:38 PM  Hospital Day - 9    Reason for consult   Chronic cough with intermittent hemoptysis  HPI   Nishi Skinner is a 80 y.o. female with PMHx of GERD, recurrent shingles, HLD, HTN, paroxysmal A. fib, Hx of LBBB, asthma who presents to [facility] on 11/10 for symptoms of chronic cough and hemoptysis. Patient has had this cough for minimum of 18 months. She was recently diagnosed with COVID on 10/29/2022 and was hospitalized at [facility], treated with Decadron 6 mg, antibiotic course, and baricitinib. Patient also reports of intermittent hemoptysis which is mild and infrequent. Was seen in the office of  on 11/10 and was transferred to inpatient services. Patient has been treated with several courses of antibiotic by PCP for cough with minimal improvement. 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 Previous chest x-rays were negative. CXR done at [facility] on 10/28 shows slight improvement in the right perihilar infiltrate residual infiltrates persist.  CTA chest on 10/28 showed patchy right middle lobe and right lower lobe airspace opacities some of which are nodular in morphology. There are trace pleural effusions. There is a cavitary 2.0 x 2.8 cm noncalcified right lower lobe pulmonary nodule as well as additional noncavitary noncalcified nodular densities in the right lower lobe that are associated with interstitial prominence and bronchial wall thickening. Findings are likely infectious in etiology. Sequela of old granulomatous disease. Subcentimeter short axis dimension mediastinal and right hilar lymph nodes.     Past 24 hours, ROS   -Remains on room air, satting in the mid to high 90s. -Right-sided PICC line incorrectly placed 11/19. Will place mid-line tomorrow am.   -No hemoptysis. - Minimal cough still improving, minimal thin clear sputum production. No SOB.   - No CP, N/V, abdominal pain. All other systems reviewed  Objective    Vitals    height is 5' 3\" (1.6 m) and weight is 125 lb 3.5 oz (56.8 kg). Her oral temperature is 98.8 °F (37.1 °C). Her blood pressure is 184/75 (abnormal) and her pulse is 63. Her respiration is 16 and oxygen saturation is 96%. O2 Flow Rate (L/min): 2 L/min  I/O    Intake/Output Summary (Last 24 hours) at 11/19/2022 1349  Last data filed at 11/19/2022 1301  Gross per 24 hour   Intake 640 ml   Output --   Net 640 ml       No data found. Exam    Physical Exam   Constitutional: No distress, still on RA. Well nourished. Head: Normocephalic and atraumatic. Mouth/Throat: Oropharynx is clear and moist.  No oral thrush. Eyes: Conjunctivae are normal. PEERL. No scleral icterus. Neck: Neck supple. No tracheal deviation present. Cardiovascular: RRR w. S1 and S2 with no murmur. No peripheral edema  Pulmonary/Chest: Normal inspiratory effort with bilateral air entry, clear breath sounds in all lung fields. No stridor. No respiratory distress. Patient exhibits no tenderness. Abdominal: Soft. Bowel sounds audible. No distension or tenderness to palp. Musculoskeletal: Moves all extremities  Neurological: Patient is alert and oriented to person, place, and time.    Meds       lidocaine 1 % injection  5 mL IntraDERmal Once    sodium chloride flush  5-40 mL IntraVENous 2 times per day    tobramycin (PF)  300 mg Nebulization BID    meropenem  500 mg IntraVENous Q8H    rivaroxaban  15 mg Oral Daily    polyethylene glycol  17 g Oral Daily    docusate sodium  100 mg Oral BID    calcium elemental  500 mg Oral Daily    ferrous sulfate  325 mg Oral BID    montelukast  10 mg Oral Nightly    pantoprazole  40 mg Oral Daily pravastatin  40 mg Oral Nightly    sotalol  80 mg Oral BID    acyclovir  400 mg Oral TID    valsartan  320 mg Oral Daily    sodium chloride flush  5-40 mL IntraVENous 2 times per day    fluticasone  1 puff Inhalation BID      sodium chloride      sodium chloride       sodium chloride flush, sodium chloride, bisacodyl, sodium chloride flush, sodium chloride  Labs   ABG  No results found for: PH, PO2, PCO2, HCO3, O2SAT  No results found for: IFIO2, MODE, SETTIDVOL, SETPEEP  CBC  Recent Labs     11/19/22  0820   WBC 7.8   RBC 3.71*   HGB 10.8*   HCT 33.4*   MCV 90.0   MCH 29.1   MCHC 32.3      MPV 9.0*        BMP  Recent Labs     11/19/22  0820      K 4.4      CO2 26   BUN 9   CREATININE 0.5   GLUCOSE 102   CALCIUM 9.2       LFT  No results for input(s): AST, ALT, ALB, BILITOT, ALKPHOS, LIPASE in the last 72 hours. Invalid input(s): AMYLASE    TROP  No results found for: TROPONINT  BNP  No results found for: PROBNP  D-Dimer  No results found for: DDIMER  Lactic Acid  No results for input(s): LACTA in the last 72 hours. INR  No results for input(s): INR, PROTIME in the last 72 hours. PTT  No results for input(s): APTT in the last 72 hours. Glucose  No results for input(s): POCGLU in the last 72 hours. UA No results for input(s): SPECGRAV, PHUR, COLORU, CLARITYU, MUCUS, PROTEINU, BLOODU, RBCUA, WBCUA, BACTERIA, NITRU, GLUCOSEU, BILIRUBINUR, UROBILINOGEN, KETUA, LABCAST, LABCASTTY, AMORPHOS in the last 72 hours. Invalid input(s): CRYSTALS.     Echo   None in epic  Cultures    Procalcitonin   No results found for: PROCAL    Bronchoscopy 11/15/2022  By Dr. Minerva Kohler culture-no prelim growth final pending  PNA panel PCR-positive for Pseudomonas aeruginosa and staph aureus  Viral respiratory panel-pending  AFB culture with smear-None on concentrated smear Final pending  Cytology-    FINAL RESULTS:     A.  Lung, right lower lobe, brushing: No malignant cells seen. Cellular degeneration and acute inflammation. B. Lung, right lower lobe, posterior, BAL:     No malignant cells seen. Cellular degeneration and acute inflammation. C. Lung, right lower lobe, brushing:     No malignant cells seen. Cellular degeneration and acute inflammation. D. Lung, bronchial washing:     No malignant cells seen. Cellular degeneration and acute inflammation. SOURCE:   A) BRUSHING , BRUSH-RLL       BAL cell count      Latest Reference Range & Units 11/15/22 19:18   BAL Character  CLOUDY/MILKY   BAL Collection Site  RLL   BAL Color  GREGORIO   Pathologist Review  ALONDRA ESTRADA   RBC BAL /cumm 2156   Total Nucleated Cells BAL /cumm 3317   Total Volume Received BAL ml 30       Radiology             Assessment    -Hemoptysis- ? 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  -Tobramycin nebs 300 mg BID for 7 days  -Continue home inhalers  -DVT prophylaxis xarelto  -Stable Pulmonary status planning dispo to ECF   -Follow-up in clinic in 3 months with CT chest w. IV contrast 2 days prior with Dr. Juan Carlos Montgomery    Case discussed with nurse and patient/family. Questions and concerns addressed. Meds and Orders reviewed.     Electronically signed by   Yomi Troncoso Bharat Perry MD on 11/19/2022     Addendum by Dr. Brittany Robert MD:  Patient seen by me independently including key components of medical care. Face to face evaluation and examination was performed. Case discussed with Dr. Miky Rivera MD-resident physician. Italicized font, if present,  represents changes to the note made by me. More than 50% of the encounter time involved with patient care by the Pulmonary & Critical care service team spent by me. Please see my modifications mentioned below:  She is on room air. Improving shortness of breath  Afebrile  Antibiotics per ID service  Patient educated about my impression and plan.     Electronically signed by   Delfino Price MD on 11/19/2022 at 2:22 PM

## 2022-11-20 VITALS
RESPIRATION RATE: 16 BRPM | HEIGHT: 63 IN | BODY MASS INDEX: 22.19 KG/M2 | HEART RATE: 66 BPM | DIASTOLIC BLOOD PRESSURE: 55 MMHG | TEMPERATURE: 98.6 F | SYSTOLIC BLOOD PRESSURE: 136 MMHG | WEIGHT: 125.22 LBS | OXYGEN SATURATION: 96 %

## 2022-11-20 LAB — CMV BY PCR, QUALITATIVE BLOOD: DETECTED

## 2022-11-20 PROCEDURE — 94640 AIRWAY INHALATION TREATMENT: CPT

## 2022-11-20 PROCEDURE — 6360000002 HC RX W HCPCS: Performed by: INTERNAL MEDICINE

## 2022-11-20 PROCEDURE — 2580000003 HC RX 258: Performed by: INTERNAL MEDICINE

## 2022-11-20 PROCEDURE — 6370000000 HC RX 637 (ALT 250 FOR IP): Performed by: NURSE PRACTITIONER

## 2022-11-20 PROCEDURE — 99232 SBSQ HOSP IP/OBS MODERATE 35: CPT | Performed by: INTERNAL MEDICINE

## 2022-11-20 PROCEDURE — 99239 HOSP IP/OBS DSCHRG MGMT >30: CPT | Performed by: INTERNAL MEDICINE

## 2022-11-20 PROCEDURE — 6370000000 HC RX 637 (ALT 250 FOR IP)

## 2022-11-20 RX ADMIN — CALCIUM 500 MG: 500 TABLET ORAL at 08:31

## 2022-11-20 RX ADMIN — FLUTICASONE PROPIONATE 1 PUFF: 110 AEROSOL, METERED RESPIRATORY (INHALATION) at 08:03

## 2022-11-20 RX ADMIN — ACYCLOVIR 400 MG: 400 TABLET ORAL at 08:31

## 2022-11-20 RX ADMIN — SOTALOL HYDROCHLORIDE 80 MG: 80 TABLET ORAL at 08:31

## 2022-11-20 RX ADMIN — VALSARTAN 320 MG: 320 TABLET, FILM COATED ORAL at 08:31

## 2022-11-20 RX ADMIN — MEROPENEM 500 MG: 500 INJECTION, POWDER, FOR SOLUTION INTRAVENOUS at 08:33

## 2022-11-20 RX ADMIN — FERROUS SULFATE TAB 325 MG (65 MG ELEMENTAL FE) 325 MG: 325 (65 FE) TAB at 08:41

## 2022-11-20 RX ADMIN — MEROPENEM 500 MG: 500 INJECTION, POWDER, FOR SOLUTION INTRAVENOUS at 12:52

## 2022-11-20 RX ADMIN — DOCUSATE SODIUM 100 MG: 100 CAPSULE, LIQUID FILLED ORAL at 08:31

## 2022-11-20 RX ADMIN — MEROPENEM 500 MG: 500 INJECTION, POWDER, FOR SOLUTION INTRAVENOUS at 00:27

## 2022-11-20 RX ADMIN — PANTOPRAZOLE SODIUM 40 MG: 40 TABLET, DELAYED RELEASE ORAL at 05:41

## 2022-11-20 RX ADMIN — Medication 300 MG: at 08:47

## 2022-11-20 NOTE — DISCHARGE SUMMARY
Hospitalist discharge note      Patient:  Amita Oden    Unit/Bed:5K-11/011-A  YOB: 1932  MRN: [id number]   Acct: [de-identified]     PCP: Costa Murillo MD  Date of Admission: 11/10/2022       Assessment/Plan:    Psuedomonas PNA- POA:   CT chest with RML RLL airspace opacities and cavitary 2.0 x 2.8 cm noncalcified right lower lobe pulmonary nodule as well as additional noncavitary noncalcified nodular densities at the right lower lobe they are associated with interstitial prominence and bronchial wall thickening. 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 AFB testing NEGATIVE    History of recent COVID PNA infection. Pulm following. Completed bronchoscopy 11/15  Respiratory panel positive for pseudomonas and staph aureus- cultures confirming pseudomonas- C and S sensitive to meropenem which was started 11/16- continue on discharge for 7 days- SNF is willing to administer  ID following     11/19-planning to continue Merrem for 4 more days as per ID-PICC line inserted as per the request of the nursing home. Plan for discharge tomorrow secondary to transport issues today    11/20-plan for giving 1 dose of Merrem today and then starting tomorrow at the nursing home continue for 3 more days and okay for discontinuing the PICC line after that at the nursing home-discussed with ID    Constipation (resolved):  Miralax daily, Colace BID. PRN Dulcolax PO. Paroxysmal Atrial Fibrillation, rate controlled:  S/P Ablation 2016. [id number] Score 4, HAS-BLED Score 2. 11/10 EKG NSR. On Xarelto    Primary HTN:  BP stable. Continue satalol and valsartan with hold parameters. Normocytic anemia- hemoglobin stable    Hyperlipidemia:   Continue pravastatin. Recurrent shingles:  No current rash. Continue acyclovir. GERD:  Continue pantoprazole. Asthma:   No acute exacerbation. Continue montelukast, fluticasone.      History of LBBB:  11/10 EKG NSR    Stable for discharge today to nursing home  3 more days of Merrem and then stop, remove the PICC line instructions given at the nursing home  Time spent 35 minutes        Addendum 11/22    Pt is already discharged to nursing home    Fungal cultures came back today, from bronch- aspergillus species- did contact the pulmonogist and ID physician immediately- collaborated care with them, both agreed on voriconazole 200mg twice daily for 8 weeks- cbc, bmp, lft every 2 weeks- results to the ID physician , and F  and virtual visit with dr harrington(ID) IN 2 WEEKS from [facility]. [facility] accepted the new antifungal order and lab tests. Chief Complaint: hemoptysis, chronic cough    Hospital Course: The patient is a 80 y.o. female who presents with complaints of chronic cough and hemoptysis. Patient reports that she has had a chronic cough ongoing for greater than than 18 months. She states due to her primary doctor is retiring as well as COVID she has been unable to have good follow-up on this problem. She also reports intermittent hemoptysis although it is mild and not frequent. Patient had referral to pulmonology today and was seen in the office of Dr. Andrew Vaughan. Patient diagnosed with COVID 19 at the end of October and had a CT chest done at that time which showed a lesion in the right lower lobe. Patient does report that this lesion has been seen on previous CT scans although those records are not available in the chart currently. 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 She has never underwent treatment for latent TB infection and chest x-ray previously were negative. Dr. Sun Veras for diagnostic bronchoscopy to further evaluate right lower lobe cavity pneumonia and hemoptysis. Concerned due to cavitary lesion for possible tuberculosis along with hemoptysis.   Patient denies any shortness of breath, chest pain, lightheadedness or dizziness, nausea/vomiting, or abdominal pain. She denies any fever or feeling unwell. Medication List        START taking these medications      meropenem 1 g injection  Commonly known as: Merrem  Inject 500 mg into the muscle in the morning and 500 mg at noon and 500 mg in the evening. Do all this for 4 days. senna-docusate 8.6-50 MG per tablet  Commonly known as: PERICOLACE     tobramycin (PF) 300 MG/5ML nebulizer solution  Commonly known as: GERRI  Take 5 mLs by nebulization in the morning and 5 mLs in the evening. Do all this for 8 doses.             CONTINUE taking these medications      beclomethasone 80 MCG/ACT Aerb inhaler  Commonly known as: QVAR REDIHALER     calcium carbonate 600 MG Tabs tablet     ferrous sulfate 325 (65 Fe) MG tablet  Commonly known as: IRON 325     montelukast 10 MG tablet  Commonly known as: SINGULAIR     nitroGLYCERIN 0.4 MG SL tablet  Commonly known as: NITROSTAT     pantoprazole 40 MG tablet  Commonly known as: PROTONIX     polyethylene glycol 17 g packet  Commonly known as: GLYCOLAX     pravastatin 40 MG tablet  Commonly known as: PRAVACHOL     PRESERVISION AREDS 2 PO     rivaroxaban 20 MG Tabs tablet  Commonly known as: XARELTO     sotalol 80 MG tablet  Commonly known as: BETAPACE     valACYclovir 500 MG tablet  Commonly known as: VALTREX     valsartan 320 MG tablet  Commonly known as: DIOVAN     Vitamin D3 125 MCG (5000 UT) Tabs            STOP taking these medications      DELSYM PO     dextromethorphan 30 MG/5ML extended release liquid  Commonly known as: DELSYM     hydroCHLOROthiazide 12.5 MG capsule  Commonly known as: MICROZIDE     lisinopril 40 MG tablet  Commonly known as: PRINIVIL;ZESTRIL     meclizine 25 MG tablet  Commonly known as: ANTIVERT     potassium chloride 20 MEQ packet  Commonly known as: KLOR-CON     vitamin B-1 100 MG tablet  Commonly known as: THIAMINE     vitamin C 500 MG tablet  Commonly known as: ASCORBIC ACID     zinc gluconate 50 MG tablet Where to Get Your Medications        Information about where to get these medications is not yet available    Ask your nurse or doctor about these medications  meropenem 1 g injection  tobramycin (PF) 300 MG/5ML nebulizer solution          Intake/Output Summary (Last 24 hours) at 11/20/2022 1224  Last data filed at 11/19/2022 1301  Gross per 24 hour   Intake 240 ml   Output --   Net 240 ml         Diet:  ADULT DIET; Regular; Low Sodium (2 gm)    Exam:  BP (!) 136/55   Pulse 66   Temp 98.6 °F (37 °C) (Oral)   Resp 16   Ht 5' 3\" (1.6 m)   Wt 125 lb 3.5 oz (56.8 kg)   SpO2 96%   BMI 22.18 kg/m²     General appearance: No apparent distress, appears stated age and cooperative. HEENT: Pupils equal, round, and reactive to light. Conjunctivae/corneas clear. Neck: Supple, with full range of motion. No jugular venous distention. Trachea midline. Respiratory:  Normal respiratory effort. Clear to auscultation, bilaterally without Rales/Wheezes/Rhonchi. Cardiovascular: Regular rate and rhythm with normal S1/S2 without murmurs, rubs or gallops. Abdomen: Soft, non-tender, non-distended with normal bowel sounds. Musculoskeletal: passive and active ROM x 4 extremities. Skin: Skin color, texture, turgor normal.  No rashes or lesions. Neurologic:  Neurovascularly intact without any focal sensory/motor deficits. Psychiatric: Alert and oriented, thought content appropriate, normal insight. Capillary Refill: Brisk,< 3 seconds   Peripheral Pulses: +2 palpable, equal bilaterally       Labs:   Recent Labs     11/19/22  0820   WBC 7.8   HGB 10.8*   HCT 33.4*          Recent Labs     11/19/22  0820      K 4.4      CO2 26   BUN 9   CREATININE 0.5   CALCIUM 9.2       No results for input(s): AST, ALT, BILIDIR, BILITOT, ALKPHOS in the last 72 hours. No results for input(s): INR in the last 72 hours. No results for input(s): Cash Chelita in the last 72 hours.     Urinalysis:    No results found for: Glorietta March, BACTERIA, RBCUA, BLOODU, [address], [address]    Radiology:  XR CHEST PORTABLE   Final Result   1. New right sided PICC line which is extending up into the right internal jugular vein and needs to be repositioned. 2. Abnormal density in the right lung suggestive off inflammatory changes. 3. Atherosclerotic calcification aortic arch. 4. Thoracic spondylosis. Roselia Leaf **This report has been created using voice recognition software. It may contain minor errors which are inherent in voice recognition technology. **      Final report electronically signed by DR Madison Olivo on 11/19/2022 12:47 PM      XR CHEST 1 VIEW   Final Result   Intraoperative appearance of the chest.      Final report electronically signed by Dr. Claudette Purple on 11/15/2022 4:33 PM      FLUORO FOR SURGICAL PROCEDURES   Final Result      CT CHEST WO CONTRAST    (Results Pending)         Code Status: Full Code    Electronically signed by Haris Kuo MD on 11/20/2022 at 12:24 PM

## 2022-11-20 NOTE — PLAN OF CARE
Problem: Safety - Adult  Goal: Free from fall injury  Outcome: Progressing   All fall precautions in place. Bed in low position, alarm activated and appropriate use of call light. Problem: ABCDS Injury Assessment  Goal: Absence of physical injury  Outcome: Progressing     Problem: Respiratory - Adult  Goal: Clear lung sounds  Outcome: Progressing   Lungs remain clear. Problem: Skin/Tissue Integrity - Adult  Goal: Skin integrity remains intact  Outcome: Progressing   Skin assessment completed. Patient turned every 2 hours and as needed. No skin breakdown this shift. Problem: Infection - Adult  Goal: Absence of infection at discharge  Outcome: Progressing     Problem: Discharge Planning  Goal: Discharge to home or other facility with appropriate resources  Outcome: Progressing     Problem: Skin/Tissue Integrity  Goal: Absence of new skin breakdown  Description: 1. Monitor for areas of redness and/or skin breakdown  2. Assess vascular access sites hourly  3. Every 4-6 hours minimum:  Change oxygen saturation probe site  4. Every 4-6 hours:  If on nasal continuous positive airway pressure, respiratory therapy assess nares and determine need for appliance change or resting period. Outcome: Progressing   Skin assessment completed. Patient turned every 2 hours and as needed. No skin breakdown this shift. Problem: Pain  Goal: Verbalizes/displays adequate comfort level or baseline comfort level  Outcome: Progressing   Pain Assessment: None - Denies Pain  Pain Level: 0       Is pain goal met at this time? Yes   Care plan reviewed with patient. Patient verbalizes understanding of the plan of care and contributes to goal setting.

## 2022-11-20 NOTE — PLAN OF CARE
Problem: Respiratory - Adult  Goal: Clear lung sounds  11/20/2022 0811 by Dhaval Viera RCP  Outcome: Progressing  11/19/2022 2217 by Caitlin Schulz RN  Outcome: Progressing   Continue therapy as ordered to achieve and maintain clear breath sounds and improve aeration.

## 2022-11-20 NOTE — PROGRESS NOTES
Patient - Ana Sandoval,  Age - 80 y.o.    - 1932      Room Number - 5K-011-A   Consulting - No att. providers found Primary Care Physician - Timoteo Armendariz MD   MRN -  [id number]   Alejandrina # - [de-identified]  Date of Admission -  11/10/2022  8:38 PM  Hospital Day - 10    Reason for consult   Chronic cough with intermittent hemoptysis  HPI   Ana Sandoval is a 80 y.o. female with PMHx of GERD, recurrent shingles, HLD, HTN, paroxysmal A. fib, Hx of LBBB, asthma who presents to [facility] on 11/10 for symptoms of chronic cough and hemoptysis. Patient has had this cough for minimum of 18 months. She was recently diagnosed with COVID on 10/29/2022 and was hospitalized at [facility], treated with Decadron 6 mg, antibiotic course, and baricitinib. Patient also reports of intermittent hemoptysis which is mild and infrequent. Was seen in the office of  on 11/10 and was transferred to inpatient services. Patient has been treated with several courses of antibiotic by PCP for cough with minimal improvement. 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 Previous chest x-rays were negative. CXR done at [facility] on 10/28 shows slight improvement in the right perihilar infiltrate residual infiltrates persist.  CTA chest on 10/28 showed patchy right middle lobe and right lower lobe airspace opacities some of which are nodular in morphology. There are trace pleural effusions. There is a cavitary 2.0 x 2.8 cm noncalcified right lower lobe pulmonary nodule as well as additional noncavitary noncalcified nodular densities in the right lower lobe that are associated with interstitial prominence and bronchial wall thickening. Findings are likely infectious in etiology. Sequela of old granulomatous disease. Subcentimeter short axis dimension mediastinal and right hilar lymph nodes.     Past 24 hours, ROS   -Remains on room air, satting in the mid to high 90s. - cough improving, minimal thin clear sputum production. No SOB. -Plan for d/c today. All other systems reviewed  Objective    Vitals    height is 5' 3\" (1.6 m) and weight is 125 lb 3.5 oz (56.8 kg). Her oral temperature is 98.6 °F (37 °C). Her blood pressure is 136/55 (abnormal) and her pulse is 66. Her respiration is 16 and oxygen saturation is 96%. O2 Flow Rate (L/min): 2 L/min  I/O  No intake or output data in the 24 hours ending 11/20/22 1527    No data found. Exam    Physical Exam   Constitutional: No distress, still on RA. Well nourished. Head: Normocephalic and atraumatic. Mouth/Throat: Oropharynx is clear and moist.  No oral thrush. Eyes: Conjunctivae are normal. PEERL. No scleral icterus. Neck: Neck supple. No tracheal deviation present. Cardiovascular: RRR w. S1 and S2 with no murmur. No peripheral edema  Pulmonary/Chest: Normal inspiratory effort with bilateral air entry, clear breath sounds in all lung fields. No stridor. No respiratory distress. Patient exhibits no tenderness. Abdominal: Soft. Bowel sounds audible. No distension or tenderness to palp. Musculoskeletal: Moves all extremities  Neurological: Patient is alert and oriented to person, place, and time.      Meds       sodium chloride flush  5-40 mL IntraVENous 2 times per day    tobramycin (PF)  300 mg Nebulization BID    meropenem  500 mg IntraVENous Q8H    rivaroxaban  15 mg Oral Daily    polyethylene glycol  17 g Oral Daily    docusate sodium  100 mg Oral BID    calcium elemental  500 mg Oral Daily    ferrous sulfate  325 mg Oral BID    montelukast  10 mg Oral Nightly    pantoprazole  40 mg Oral Daily    pravastatin  40 mg Oral Nightly    sotalol  80 mg Oral BID    acyclovir  400 mg Oral TID    valsartan  320 mg Oral Daily    sodium chloride flush  5-40 mL IntraVENous 2 times per day    fluticasone  1 puff Inhalation BID      sodium chloride      sodium chloride       sodium chloride flush, sodium chloride, bisacodyl, sodium chloride flush, sodium chloride  Labs   ABG  No results found for: PH, PO2, PCO2, HCO3, O2SAT  No results found for: IFIO2, MODE, SETTIDVOL, SETPEEP  CBC  Recent Labs     11/19/22  0820   WBC 7.8   RBC 3.71*   HGB 10.8*   HCT 33.4*   MCV 90.0   MCH 29.1   MCHC 32.3      MPV 9.0*        BMP  Recent Labs     11/19/22  0820      K 4.4      CO2 26   BUN 9   CREATININE 0.5   GLUCOSE 102   CALCIUM 9.2       LFT  No results for input(s): AST, ALT, ALB, BILITOT, ALKPHOS, LIPASE in the last 72 hours. Invalid input(s): AMYLASE    TROP  No results found for: TROPONINT  BNP  No results found for: PROBNP  D-Dimer  No results found for: DDIMER  Lactic Acid  No results for input(s): LACTA in the last 72 hours. INR  No results for input(s): INR, PROTIME in the last 72 hours. PTT  No results for input(s): APTT in the last 72 hours. Glucose  No results for input(s): POCGLU in the last 72 hours. UA No results for input(s): SPECGRAV, PHUR, COLORU, CLARITYU, MUCUS, PROTEINU, BLOODU, RBCUA, WBCUA, BACTERIA, NITRU, GLUCOSEU, BILIRUBINUR, UROBILINOGEN, KETUA, LABCAST, LABCASTTY, AMORPHOS in the last 72 hours. Invalid input(s): CRYSTALS. Echo   None in epic  Cultures    Procalcitonin   No results found for: PROCAL    Bronchoscopy 11/15/2022  By Dr. Nick Lopez culture-no prelim growth final pending  PNA panel PCR-positive for Pseudomonas aeruginosa and staph aureus  Viral respiratory panel-pending  AFB culture with smear-None on concentrated smear Final pending  Cytology-    FINAL RESULTS:     A.  Lung, right lower lobe, brushing:       No malignant cells seen. Cellular degeneration and acute inflammation. B. Lung, right lower lobe, posterior, BAL:     No malignant cells seen. Cellular degeneration and acute inflammation. C. Lung, right lower lobe, brushing:     No malignant cells seen. Cellular degeneration and acute inflammation. D. Lung, bronchial washing:     No malignant cells seen. Cellular degeneration and acute inflammation. SOURCE:   A) BRUSHING , BRUSH-RLL       BAL cell count      Latest Reference Range & Units 11/15/22 19:18   BAL Character  CLOUDY/MILKY   BAL Collection Site  RLL   BAL Color  GREGORIO   Pathologist Review  ALONDRA ESTRADA   RBC BAL /cumm 2156   Total Nucleated Cells BAL /cumm 3317   Total Volume Received BAL ml 30       Radiology             Assessment    -Hemoptysis- ? 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 IV contrast 2 days prior with Dr. Ileana Mccarty    Case discussed with nurse and patient/family. Questions and concerns addressed. Meds and Orders reviewed. Electronically signed by   Susana Lombardo MD on 11/20/2022     Addendum by Dr. Ileana Mccarty MD:  Patient seen by me independently including key components of medical care. Face to face evaluation and examination was performed. Case discussed with Dr. Susana Lombardo MD-resident physician. Italicized font, if present,  represents changes to the note made by me.    More than 50% of the encounter time involved with patient care by the Pulmonary & Critical care service team spent by me. Please see my modifications mentioned below:  She is on room air  He underwent a right upper extremity PICC line placement  [facility]  Follow-up as above  Aly Repress educated about my impression and plan. She verbalizes understanding.     Electronically signed by   Polina Sequeira MD on 11/20/2022 at 4:35 PM

## 2022-11-20 NOTE — CARE COORDINATION
11/20/22, 10:20 AM EST    Patient goals/plan/ treatment preferences discussed by  and . Patient goals/plan/ treatment preferences reviewed with patient/ family. Patient/ family verbalize understanding of discharge plan and are in agreement with goal/plan/treatment preferences. Understanding was demonstrated using the teach back method. AVS provided by RN at time of discharge, which includes all necessary medical information pertaining to the patients current course of illness, treatment, post-discharge goals of care, and treatment preferences. Services At/After Discharge: [place] ([facility])       IMM Letter  IMM Letter given to Patient/Family/Significant other/Guardian/POA/by[de-identified] Copy delivered to patient by Joss Raymundo  IMM Letter date given[de-identified] 11/18/22  IMM Letter time given[de-identified] 8424     Midline placed yesterday afternoon. Planning discharge to SNF today. Family to transport this afternoon per Ceci Check RN. Phone call to [facility] with Thomas Rodriguez, jose angel.

## 2022-11-20 NOTE — DISCHARGE INSTR - COC
Continuity of Care Form    Patient Name: Paul Devine   :  1932  MRN:  [id number]    Admit date:  11/10/2022  Discharge date:  22    Code Status Order: Full Code   Advance Directives:     Admitting Physician:  Anahi Leyva MD  PCP: Ramona Resendez MD    Discharging Nurse: [facility] Unit/Room#: 5K-11/011-A  Discharging Unit Phone Number: [phone]    Emergency Contact:   Extended Emergency Contact Information  Primary Emergency Contact: [facility] Phone: [phone]  Relation: Child    Past Surgical History:  Past Surgical History:   Procedure Laterality Date    BRONCHOSCOPY N/A 11/15/2022    [facility]    BRONCHOSCOPY  11/15/2022    BRONCHOSCOPY ALVEOLAR LAVAGE performed by Lis Root MD at [facility] [place] [facility]       Immunization History:   Immunization History   Administered Date(s) Administered    COVID-19, PFIZER PURPLE top, DILUTE for use, (age 15 y+), 30mcg/0.3mL 2021, 2021, 10/08/2021, 2022       Active Problems:  Patient Active Problem List   Diagnosis Code    Hemoptysis R04.2    Paroxysmal atrial fibrillation (HCC) I48.0    Cavitary lesion of lung J98.4    Primary hypertension I10    Chronic a-fib ([address] I48.20    Abnormal CT of the chest R93.89    Moderate persistent asthma without complication I27.94       Isolation/Infection:   Isolation            No Isolation          Patient Infection Status       Infection Onset Added Last Indicated Last Indicated By Review Planned Expiration Resolved Resolved By    None active    Resolved    Pulmonary Tuberculosis (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) 11/10/22 11/10/22 11/15/22 Culture with Smear, Acid Fast Bacillius (Ordered)   22 Rule-Out Test Resulted            Nurse Assessment:  Last Vital Signs: BP (!) 136/55   Pulse 66   Temp 98.6 °F (37 °C) (Oral)   Resp 16   Ht 5' 3\" (1.6 m)   Wt 125 lb 3.5 oz (56.8 kg)   SpO2 96%   BMI 22.18 kg/m²     Last documented pain score (0-10 scale): Pain Level: 0  Last Weight:   Wt Readings from Last 1 Encounters:   11/15/22 125 lb 3.5 oz (56.8 kg)     Mental Status:  oriented and alert    IV Access:  - PICC - site  R Upper Arm, insertion date: 11/19/22    Nursing Mobility/ADLs:  Walking   Independent  Transfer  Independent  Bathing  Independent  Dressing  Independent  [address]  Med Delivery   whole    Wound Care Documentation and Therapy:        Elimination:  Continence: Bowel: Yes  Bladder: Yes  Urinary Catheter: None   Colostomy/Ileostomy/Ileal Conduit: No       Date of Last BM: 11/19/22    Intake/Output Summary (Last 24 hours) at 11/20/2022 1107  Last data filed at 11/19/2022 1301  Gross per 24 hour   Intake 240 ml   Output --   Net 240 ml     I/O last 3 completed shifts: In: 0 [P.O.:640]  Out: -     Safety Concerns:     None    Impairments/Disabilities:      Vision and Hearing    Nutrition Therapy:  Current Nutrition Therapy:   - Oral Diet:  General    Routes of Feeding: Oral  Liquids: Thin Liquids  Daily Fluid Restriction: no  Last Modified Barium Swallow with Video (Video Swallowing Test): not done    Treatments at the Time of Hospital Discharge:   Respiratory Treatments: na  Oxygen Therapy:  is not on home oxygen therapy.   Ventilator:    - No ventilator support    Rehab Therapies: na  Weight Bearing Status/Restrictions: No weight bearing restrictions  Other Medical Equipment (for information only, NOT a DME order):  na  Other Treatments: na    Patient's personal belongings (please select all that are sent with patient):  Glasses, jewelry, cell phone    RN SIGNATURE:  Electronically signed by Jani Winslow RN on 11/20/22 at 11:12 AM EST    CASE MANAGEMENT/SOCIAL WORK SECTION    Inpatient Status Date: 11/10/2022    Readmission Risk Assessment Score:  Readmission Risk              Risk of Unplanned Readmission:  13           Discharging to Facility/ Agency   Name: [facility]ia [facility]as  Address: [address]  Phone: [phone]    Dialysis Facility (if applicable)   Name:  Address:  Dialysis Schedule:  Phone:  Fax:    / signature: Electronically signed by Christine Galarza RN on 11/20/22 at 11:18 AM EST    PHYSICIAN SECTION    Prognosis: Good    Condition at Discharge: Stable    Rehab Potential (if transferring to Rehab): Good    Recommended Labs or Other Treatments After Discharge: see above    Physician Certification: I certify the above information and transfer of Daina Baumann  is necessary for the continuing treatment of the diagnosis listed and that she requires [facility] for greater 30 days.      Update Admission H&P: No change in H&P    PHYSICIAN SIGNATURE:  Electronically signed by Timoteo Pool MD on 11/20/22 at 11:16 AM EST

## 2022-11-21 LAB
FUNGUS IDENTIFIED: NORMAL
FUNGUS SMEAR: ABNORMAL
FUNGUS SMEAR: ABNORMAL
FUNGUS SMEAR: NORMAL
ORGANISM: ABNORMAL
ORGANISM: ABNORMAL

## 2022-11-22 LAB — VIRAL CULTURE,RAPID,RESPIRATOR: NORMAL

## 2022-11-22 RX ORDER — VORICONAZOLE 200 MG/1
200 TABLET, FILM COATED ORAL 2 TIMES DAILY
Qty: 120 TABLET | Refills: 0 | DISCHARGE
Start: 2022-11-22 | End: 2023-01-21

## 2022-11-22 NOTE — CARE COORDINATION
Phone call received from Dr. Pako Diop. Emmett's cultures came back from her bronch indicating the need for voriconazole 200 mg. Twice daily for 6-8 weeks. She requested this writer reach out to [facility] to see if it could be administered there. Phone message left for [facility], . Electronically signed by Sakina Castillo RN on 11/22/22 at 12:24 PM EST    Script called into RN at [place] [address] 12:50 p.m. voriconazole 200 mg. Twice daily for weeks. Labs faxed to [facility] per orders of Dr. Pako Diop. 11/22/2022 14:00    Spoke to Weston Catherine RN at [facility]. She just urszula the labs on [facility] and will be faxing results to Dr. Ce Gray office.  Electronically signed by Sakina Castillo RN on 11/23/22 at 9:05 AM EST

## 2022-11-22 NOTE — PROGRESS NOTES
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 Patient had hx of COVID and was on steroid. With the nodules in the lung and cavitation, it is better to treat with voriconazole. Will check if the nursing home is able to cover the medication. If unable , she may need to come back to start the medication until we get authorization. Discussed with Dr Ki Dallas and Dr Arielle Schumacher.

## 2022-12-29 ENCOUNTER — TELEPHONE (OUTPATIENT)
Dept: PULMONOLOGY | Age: 87
End: 2022-12-29

## 2022-12-29 NOTE — TELEPHONE ENCOUNTER
Son called asking if ct could be completed @ [place]. . orders faxed over to [facility].

## 2023-01-30 NOTE — PROGRESS NOTES
[facility]. Pulmonary medicine clinic follow up note. Patient: Talha Arcos  : 1932      Chief complaint/[place]: Talha Arcos is a 80 y.o. old female came for follow-up regarding her chronic cough and intermittent hemoptysis. She was hospitalized to [address] [place] [address] on 10 November 2022. Patient underwent diagnostic flexible bronchoscopy by me, 15 November 2022 at [address] [place] [address]. Patient's bronch cultures grew Pseudomonas aeruginosa. Patient underwent treatment with antibiotics with IV Merrem along with inhaled tobramycin 300 mg p.o. twice daily for 7 days under guidance by Dr Nuha Main MD from ID service. Patient was subsequently discharged and came for follow-up. She also underwent treatment for her Aspergillus infection of the lungs with voriconazole for 3 months. She used to take 200 mg of voriconazole 1 tablet p.o. twice daily. She was advised to have a repeat CT scan of chest with IV contrast before clinic visit. She came today for follow-up with repeat CT scan of chest.    She was initially referred from Dr. Justin Nguyen MD.    She was brought to my clinic accompanied by her son Dr. Marquez Aquiles    She underwent CT scan of the head at [facility] on 2023. She was found to have abnormal sinuses. She is waiting to see ENT physician. On today's questioning:  She denies cough or expectoration. She denies hemoptysis. She denies fever or chills. She denies recent hospitalizations or emergency room visits. She is currently not using any inhalers. She denies recent loss of weight or appetite changes. She denies recent decline in functional status.     She is currently using any oxygen supplementation at rest, exercise or during sleep/at night time: No    She ever diagnosed with connective tissue diseases including Systemic lupus Erythematosus, Rheumatoid arthritis etc:NO      She ever diagnosed with COVID-19 infection in the past: She was diagnosed with the COVID-19 infection on 29 October 2022. She was hospitalized to [facility] and stayed for 2 nights. She was treated with steroids Decadron 6 mg IV daily and was discharged home with 6 mg p.o. daily for 4 days. She completed her steroids 2 days back on 8 November 2022. She was also treated with ceftriaxone 1 g IV piggyback daily antibiotic during her hospitalization for 3 days and was discharged home on doxycycline 100 mg p.o. twice daily for 4 days. She was also treated with baricitinib 4 mg daily. She was also given treatment with vitamin C 500 mg p.o. twice daily along with vitamin D 5000 units daily and a zinc sulfate 220 mg p.o. twice daily. Social History:  Occupation:  She is current working: No  Type of profession: retired. 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       History of exposure to coal mines/coal dust: NO  History of exposure to General CQuotient dust/welding: NO  History of exposure to quarry/silica/sandblasting: NO  History of exposure to asbestos/working with breaks/ships: NO  History of exposure to farm dust: NO  History of recent travel to long distances: NO  History of exposure to birds, pigeons, or chickens in the past:NO  Pet animals at home:No    History of pulmonary embolism in the past: No            History of DVT in the past:No                               Review of Systems:   General/Constitutional: she gained 1lb of weight from the last visit. No fever or chills. HENT: Negative. Eyes: Negative. Upper respiratory tract: No nasal stuffiness or post nasal drip. Lower respiratory tract/ lungs: No cough or sputum production. Cardiovascular: No palpitations or chest pain.   Gastrointestinal: No nausea or vomiting. Neurological: No focal neurologiacal weakness. Extremities: No edema. Musculoskeletal: No complaints. Genitourinary: No complaints. Hematological: Negative. Psychiatric/Behavioral: Negative. Skin: No itching.           Current Medications:      Past Medical History:   Diagnosis Date    Asthma     Cognitive communication deficit     Left bundle branch block     Pneumonia        Past Surgical History:   Procedure Laterality Date    BRONCHOSCOPY N/A 11/15/2022    BRONCHOSCOPY BRUSHINGS performed by Lis Root MD at [facility]    BRONCHOSCOPY  11/15/2022    BRONCHOSCOPY ALVEOLAR LAVAGE performed by Lsi Root MD at [facility]       Allergies   Allergen Reactions    Pcn [Penicillins] Shortness Of Breath     unknown    Accolate [Zafirlukast] Other (See Comments)     unknown    Asa [Aspirin] Other (See Comments)     unknown    Chlordiazepoxide Other (See Comments)     unknown    Clindamycin/Lincomycin Hives     Unknown    Codeine      All codeine    Demerol [Meperidine Hcl] Other (See Comments)     unknown    Diazepam Other (See Comments)     unknown    Diclofenac Sodium Other (See Comments)     unknown    Ethylenediamine      unknown    Evista [Raloxifene] Other (See Comments)     unknown    Gabapentin Other (See Comments)     unknown    Iodine Other (See Comments)     unknown    Metoprolol Other (See Comments)     unknown    Nitrofurantoin      unknown    Oxycodone-Acetaminophen      unknown    Phenergan [Promethazine] Other (See Comments)     unknown    Promethazine Hcl Other (See Comments)     unknown    Talwin [Pentazocine] Other (See Comments)     unknown    Triamcinolone Acetonide Other (See Comments)     unknown    Voltaren [Diclofenac]      unknown    Ciprofloxacin Rash     unknown       Current Outpatient Medications   Medication Sig Dispense Refill    sotalol (BETAPACE) 80 MG tablet Take 80 mg by mouth 2 times daily      valsartan (DIOVAN) 320 MG tablet Take 320 mg by mouth daily      calcium carbonate 600 MG TABS tablet Take 1 tablet by mouth daily      Cholecalciferol (VITAMIN D3) 125 MCG (5000 UT) TABS Take by mouth      ferrous sulfate (IRON 325) 325 (65 Fe) MG tablet Take 325 mg by mouth in the morning and at bedtime      montelukast (SINGULAIR) 10 MG tablet Take 10 mg by mouth nightly      nitroGLYCERIN (NITROSTAT) 0.4 MG SL tablet Place 0.4 mg under the tongue every 5 minutes as needed for Chest pain up to max of 3 total doses. If no relief after 1 dose, call 911. polyethylene glycol (GLYCOLAX) 17 g packet Take 17 g by mouth daily as needed for Constipation      pravastatin (PRAVACHOL) 40 MG tablet Take 40 mg by mouth daily      Multiple Vitamins-Minerals (PRESERVISION AREDS 2 PO) Take by mouth      pantoprazole (PROTONIX) 40 MG tablet Take 40 mg by mouth daily      beclomethasone (QVAR REDIHALER) 80 MCG/ACT AERB inhaler Inhale 1 puff into the lungs 2 times daily      senna-docusate (PERICOLACE) 8.6-50 MG per tablet Take 1 tablet by mouth daily       No current facility-administered medications for this visit. No family history on file. Physical Exam:    VITALS:  /66 (Site: Left Upper Arm, Position: Sitting, Cuff Size: Medium Adult)   Pulse 69   Temp 98.1 °F (36.7 °C)   Ht 5' 2.5\" (1.588 m)   Wt 124 lb (56.2 kg)   SpO2 98% Comment: room air at rest  BMI 22.32 kg/m²   Nursing note and vitals reviewed. Constitutional: Patient appears moderately built and moderately nourished. No distress. Patient is oriented to person, place, and time. HENT:   Head: Normocephalic and atraumatic. Right Ear: External ear normal.   Left Ear: External ear normal.   Mouth/Throat: Oropharynx is clear and moist.  No oral thrush. Eyes: Conjunctivae are normal. Pupils are equal, round, and reactive to light. No scleral icterus. Neck: Neck supple. No JVD present. No tracheal deviation present.    Cardiovascular: Normal rate, regular rhythm, normal heart sounds. No murmur heard. Pulmonary/Chest: Effort normal and breath sounds normal. No stridor. No respiratory distress. No wheezes. No rales. Patient exhibits no tenderness. Abdominal: Soft. Patient exhibits no distension. No tenderness. Musculoskeletal: Normal range of motion. Extremities: Patient exhibits no edema and no tenderness. Lymphadenopathy:  No cervical adenopathy. Neurological: Patient is alert and oriented to person, place, and time. Skin: Skin is warm and dry. Patient is not diaphoretic. Psychiatric: Patient  has a normal mood and affect. Patient behavior is normal.       Diagnostic Data:    Radiological Data:  Chest x-ray single view performed on 28 October 2022:        CT scan of chest with IV contrast performed on 28 October 2022 performed at [facility]:            Pulmonary function tests:  None in epic    Echocardiogram: None in epic    Bronchoscopy results:  Hospital performed: [facility]. Date of procedure: 15 November 2022 at [facility]  Procedure: Bronchioalveolar lavage: obtained from right lower lobe posterior segment. Bronch washings obtained from right tracheobronchial tree including right upper lobe, right lower lobe and right middle lobe. Cytology brush specimen was obtained from right lower lobe posterior segment under fluroscopy guidance. Microbiology brush specimen was obtained from right lower lobe posterior segment under fluroscopy guidence. BAL Differential:        Bronch washings/BAL ( Broncho alveolar lavage): 15 November 2022  Pseudomonas aeruginosa  Normal larissa  Aspergillus species.   Aspergillus galactomannan antigen on BAL-negative-0.46 (abnormal if it is more than or equal to 0.5)    CMV by PCR: Detected  Herpes simplex virus by PCR: Negative  Partially acid-fast bacilli organisms: Negative  Acid-fast bacilli: Negative  Fungus Cultures:Negative  Pneumonia panel by molecular PCR: Pneumocystis Jirovcci organisms: Negative  Bronchoscopy 11/15/2022  By Dr. Cristine Yates culture-no prelim growth final pending  PNA panel PCR-positive for Pseudomonas aeruginosa and staph aureus  Viral respiratory panel-pending  AFB culture with smear-None on concentrated smear Final pending  Cytology-     FINAL RESULTS:     A.  Lung, right lower lobe, brushing:       No malignant cells seen. Cellular degeneration and acute inflammation. B. Lung, right lower lobe, posterior, BAL:     No malignant cells seen. Cellular degeneration and acute inflammation. C. Lung, right lower lobe, brushing:     No malignant cells seen. Cellular degeneration and acute inflammation. D. Lung, bronchial washing:     No malignant cells seen. Cellular degeneration and acute inflammation. SOURCE:   A) BRUSHING , BRUSH-RLL        BAL cell count        Latest Reference Range & Units 11/15/22 19:18   BAL Character   CLOUDY/MILKY   BAL Collection Site   RLL   BAL Color   GREGORIO   Pathologist Review   ALONDRA ESTRADA   RBC BAL /cumm 2156   Total Nucleated Cells BAL /cumm 3317   Total Volume Received BAL ml 30      CT scan of chest without IV contrast performed on 16 February 2023:        The above radiological study films were reviewed by me. Assessment:  -Chronic cough due to-differential diagnoses includes-due to ACE inhibitor related VS right lower lobe cavitary pneumonia Vs ?chronic sinusitis, GERD, Asthma and allergic rhinitis- resolved.   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  Patient underwent bronchoscopy and found to have a negative cultures for acid-fast bacilli.  -History of chronic sinusitis in the past.  Patient underwent antrostomy along with a polypectomy in the past.  She is waiting to see her ENT surgeon.  -Chronic atrial fibrillation. She is currently not taking any anticoagulation. She is on treatment with sotalol. She is currently following with a cardiologist Dr. Joseph Howard MD in [facility].  -Moderate persistent bronchial asthma. She is on treatment with Qvar and  Singulair 10 mg p.o. daily.  -History of left bundle branch block.  -History of recurrent shingles. She is on treatment with Valtrex 500 mg p.o. daily. Recommendations/Plan:  -We will schedule patient for CT scan of chest without IV contrast in 6 months to follow her multilobar bronchopneumonia noted on her CT scan of chest dated 16 February 2023. There is a significant improvement in her pneumonia from her previous CT scan performed in October 2022.  -She was advised to keep her scheduled appointment with her ENT surgeon in [facility]"" next month to manage her abnormal CT scan of sinuses noted on her CT scan of head performed on 2 February 2023 with a suspicion for fungal infection.  -Schedule patient for follow up with my clinic in 6 months with recommended test I.e CT chest with out contrast and letter from her ENT surgeon. Patient advised to make early appointment if needed. -Vijaya Martin was advised to make early appointment with my clinic if she develops any constitutional symptoms including loss of weight, poor appetite or hemoptysis. She verbalizes under standing.  -She had a son were educated about my impression and plan. They verbalizes understanding.      -I personally reviewed updated the Past medical hx, Past surgical hx,Social hx, Family hx, Medications, Allergies in the discrete data section of the patient chart along with labs, Pulmonary medicine,Sleep medicine related, Pathological, Microbiological and Radiological investigations.

## 2023-02-23 ENCOUNTER — OFFICE VISIT (OUTPATIENT)
Dept: PULMONOLOGY | Age: 88
End: 2023-02-23
Payer: MEDICARE

## 2023-02-23 VITALS
BODY MASS INDEX: 21.97 KG/M2 | WEIGHT: 124 LBS | HEART RATE: 69 BPM | SYSTOLIC BLOOD PRESSURE: 130 MMHG | TEMPERATURE: 98.1 F | DIASTOLIC BLOOD PRESSURE: 66 MMHG | HEIGHT: 63 IN | OXYGEN SATURATION: 98 %

## 2023-02-23 DIAGNOSIS — R93.89 ABNORMAL CT OF THE CHEST: Primary | ICD-10-CM

## 2023-02-23 DIAGNOSIS — J98.4 PNEUMONIA WITH CAVITY OF LUNG: ICD-10-CM

## 2023-02-23 DIAGNOSIS — J15.1 PNEUMONIA OF RIGHT LOWER LOBE DUE TO PSEUDOMONAS SPECIES (HCC): ICD-10-CM

## 2023-02-23 DIAGNOSIS — B44.9 ASPERGILLUS PNEUMONIA (HCC): ICD-10-CM

## 2023-02-23 DIAGNOSIS — J18.9 PNEUMONIA WITH CAVITY OF LUNG: ICD-10-CM

## 2023-02-23 PROCEDURE — 99214 OFFICE O/P EST MOD 30 MIN: CPT | Performed by: INTERNAL MEDICINE

## 2023-02-23 PROCEDURE — 1123F ACP DISCUSS/DSCN MKR DOCD: CPT | Performed by: INTERNAL MEDICINE

## 2023-02-23 NOTE — PATIENT INSTRUCTIONS
Recommendations/Plan:  -We will schedule patient for CT scan of chest without IV contrast in 6 months to follow her multilobar bronchopneumonia noted on her CT scan of chest dated 16 February 2023. There is a significant improvement in her pneumonia from her previous CT scan performed in October 2022.  -She was advised to keep her scheduled appointment with her ENT surgeon in [facility] next month to manage her abnormal CT scan of sinuses noted on her CT scan of head performed on 2 February 2023 with a suspicion for fungal infection.  -Francesca Queen was advised to make early appointment with my clinic if she develops any constitutional symptoms including loss of weight, poor appetite or hemoptysis. She verbalizes under standing.  -She had a son were educated about my impression and plan.   They verbalizes understandi

## 2023-07-28 ENCOUNTER — TELEPHONE (OUTPATIENT)
Dept: PULMONOLOGY | Age: 88
End: 2023-07-28

## 2023-07-28 NOTE — TELEPHONE ENCOUNTER
Received approval through [facility] for CT chest at [facility] (see media).     Auth number: [id number]  Approval through 7/28/23-1/24/24

## 2023-07-28 NOTE — TELEPHONE ENCOUNTER
Phoned Josiane at [phone] to start prior auth for CT chest without contrast scheduled for 8/17/23 at [facility].

## 2023-07-28 NOTE — TELEPHONE ENCOUNTER
Phoned Kennedi at [facility] to notify of CT approval and that approval and order was faxed to her today.

## 2023-08-24 DIAGNOSIS — B44.9 ASPERGILLUS PNEUMONIA (HCC): ICD-10-CM

## 2023-08-24 DIAGNOSIS — J18.9 PNEUMONIA WITH CAVITY OF LUNG: ICD-10-CM

## 2023-08-24 DIAGNOSIS — J98.4 PNEUMONIA WITH CAVITY OF LUNG: ICD-10-CM

## 2023-08-24 DIAGNOSIS — R93.89 ABNORMAL CT OF THE CHEST: ICD-10-CM

## 2023-08-24 DIAGNOSIS — J15.1 PNEUMONIA OF RIGHT LOWER LOBE DUE TO PSEUDOMONAS SPECIES (HCC): ICD-10-CM

## 2023-08-28 ENCOUNTER — TELEPHONE (OUTPATIENT)
Dept: PULMONOLOGY | Age: 88
End: 2023-08-28

## 2023-08-28 NOTE — TELEPHONE ENCOUNTER
I called this pt last week since she cancelled the F/U with us. She is following with previous physician she saw before she moved. Dr Arcelia Monsalve from [facility]. I faxed the CT to them on 8/25/23. She said that she had requested it to be sent there when she was at [facility].

## 2023-08-28 NOTE — TELEPHONE ENCOUNTER
----- Message from Jaylan Rosen MD sent at 8/24/2023  6:16 PM EDT -----  Regarding: Needs follow up appt  Dear Otis Palacios and Mann Mello,    The above patient found to have an abnormal CT scan of chest.  Patient need to come for follow-up with my clinic or with Ms. Alana Oconnor, CNP clinic in the next 1 week to go over the CT scan report and further management. Please schedule appointment and inform patient regarding follow-up.     Thanks  Aileen

## 2024-04-17 ENCOUNTER — HOSPITAL ENCOUNTER (OUTPATIENT)
Age: 89
Setting detail: OBSERVATION
Discharge: HOME | End: 2024-04-18
Attending: STUDENT IN AN ORGANIZED HEALTH CARE EDUCATION/TRAINING PROGRAM
Payer: MEDICARE

## 2024-04-17 PROBLEM — R06.02 SOB (SHORTNESS OF BREATH): Status: ACTIVE | Noted: 2024-04-17

## 2024-04-17 LAB
ANION GAP SERPL CALC-SCNC: 13 MEQ/L (ref 8–16)
BASOPHILS ABSOLUTE: 0 THOU/MM3 (ref 0–0.1)
BASOPHILS NFR BLD AUTO: 0.2 %
BUN SERPL-MCNC: 16 MG/DL (ref 7–22)
CALCIUM SERPL-MCNC: 8.6 MG/DL (ref 8.5–10.5)
CHLORIDE SERPL-SCNC: 104 MEQ/L (ref 98–111)
CO2 SERPL-SCNC: 22 MEQ/L (ref 23–33)
CREAT SERPL-MCNC: 0.7 MG/DL (ref 0.4–1.2)
DEPRECATED RDW RBC AUTO: 48.3 FL (ref 35–45)
EOSINOPHIL NFR BLD AUTO: 0 %
EOSINOPHILS ABSOLUTE: 0 THOU/MM3 (ref 0–0.4)
ERYTHROCYTE [DISTWIDTH] IN BLOOD BY AUTOMATED COUNT: 15.2 % (ref 11.5–14.5)
GFR SERPL CREATININE-BSD FRML MDRD: 81 ML/MIN/1.73M2
GLUCOSE SERPL-MCNC: 124 MG/DL (ref 70–108)
HCT VFR BLD AUTO: 41.8 % (ref 37–47)
HGB BLD-MCNC: 13.4 GM/DL (ref 12–16)
IMM GRANULOCYTES # BLD AUTO: 0.05 THOU/MM3 (ref 0–0.07)
IMM GRANULOCYTES NFR BLD AUTO: 0.6 %
LYMPHOCYTES ABSOLUTE: 1.5 THOU/MM3 (ref 1–4.8)
LYMPHOCYTES NFR BLD AUTO: 17.1 %
MCH RBC QN AUTO: 27.8 PG (ref 26–33)
MCHC RBC AUTO-ENTMCNC: 32.1 GM/DL (ref 32.2–35.5)
MCV RBC AUTO: 86.7 FL (ref 81–99)
MONOCYTES ABSOLUTE: 0.2 THOU/MM3 (ref 0.4–1.3)
MONOCYTES NFR BLD AUTO: 2.6 %
NEUTROPHILS NFR BLD AUTO: 79.5 %
NRBC BLD AUTO-RTO: 0 /100 WBC
PLATELET # BLD AUTO: 248 THOU/MM3 (ref 130–400)
PMV BLD AUTO: 9.5 FL (ref 9.4–12.4)
POTASSIUM SERPL-SCNC: 4 MEQ/L (ref 3.5–5.2)
PROCALCITONIN SERPL IA-MCNC: 0.04 NG/ML (ref 0.01–0.09)
RBC # BLD AUTO: 4.82 MILL/MM3 (ref 4.2–5.4)
SEGMENTED NEUTROPHILS ABSOLUTE COUNT: 7.1 THOU/MM3 (ref 1.8–7.7)
SODIUM SERPL-SCNC: 139 MEQ/L (ref 135–145)
WBC # BLD AUTO: 8.9 THOU/MM3 (ref 4.8–10.8)

## 2024-04-17 PROCEDURE — 94669 MECHANICAL CHEST WALL OSCILL: CPT

## 2024-04-17 PROCEDURE — G0379 DIRECT REFER HOSPITAL OBSERV: HCPCS

## 2024-04-17 PROCEDURE — G0378 HOSPITAL OBSERVATION PER HR: HCPCS

## 2024-04-17 PROCEDURE — 96372 THER/PROPH/DIAG INJ SC/IM: CPT

## 2024-04-17 PROCEDURE — 84145 PROCALCITONIN (PCT): CPT

## 2024-04-17 PROCEDURE — 94640 AIRWAY INHALATION TREATMENT: CPT

## 2024-04-17 PROCEDURE — 6360000002 HC RX W HCPCS: Performed by: NURSE PRACTITIONER

## 2024-04-17 PROCEDURE — 80048 BASIC METABOLIC PNL TOTAL CA: CPT

## 2024-04-17 PROCEDURE — 36415 COLL VENOUS BLD VENIPUNCTURE: CPT

## 2024-04-17 PROCEDURE — 6370000000 HC RX 637 (ALT 250 FOR IP): Performed by: NURSE PRACTITIONER

## 2024-04-17 PROCEDURE — 85025 COMPLETE CBC W/AUTO DIFF WBC: CPT

## 2024-04-17 PROCEDURE — 2580000003 HC RX 258: Performed by: NURSE PRACTITIONER

## 2024-04-17 PROCEDURE — 99223 1ST HOSP IP/OBS HIGH 75: CPT | Performed by: NURSE PRACTITIONER

## 2024-04-17 RX ORDER — POTASSIUM CHLORIDE 20 MEQ/1
40 TABLET, EXTENDED RELEASE ORAL PRN
Status: DISCONTINUED | OUTPATIENT
Start: 2024-04-17 | End: 2024-04-18 | Stop reason: HOSPADM

## 2024-04-17 RX ORDER — CALCIUM CARBONATE 500(1250)
500 TABLET ORAL DAILY
Status: DISCONTINUED | OUTPATIENT
Start: 2024-04-17 | End: 2024-04-18 | Stop reason: HOSPADM

## 2024-04-17 RX ORDER — PREDNISONE 20 MG/1
20 TABLET ORAL DAILY
Status: DISCONTINUED | OUTPATIENT
Start: 2024-04-17 | End: 2024-04-18

## 2024-04-17 RX ORDER — SOTALOL HYDROCHLORIDE 80 MG/1
80 TABLET ORAL 2 TIMES DAILY
Status: DISCONTINUED | OUTPATIENT
Start: 2024-04-17 | End: 2024-04-18 | Stop reason: HOSPADM

## 2024-04-17 RX ORDER — POTASSIUM CHLORIDE 7.45 MG/ML
10 INJECTION INTRAVENOUS PRN
Status: DISCONTINUED | OUTPATIENT
Start: 2024-04-17 | End: 2024-04-18 | Stop reason: HOSPADM

## 2024-04-17 RX ORDER — ENOXAPARIN SODIUM 100 MG/ML
40 INJECTION SUBCUTANEOUS DAILY
Status: DISCONTINUED | OUTPATIENT
Start: 2024-04-17 | End: 2024-04-18 | Stop reason: HOSPADM

## 2024-04-17 RX ORDER — VALSARTAN 320 MG/1
320 TABLET ORAL DAILY
Status: DISCONTINUED | OUTPATIENT
Start: 2024-04-18 | End: 2024-04-18 | Stop reason: HOSPADM

## 2024-04-17 RX ORDER — ACETAMINOPHEN 650 MG/1
650 SUPPOSITORY RECTAL EVERY 6 HOURS PRN
Status: DISCONTINUED | OUTPATIENT
Start: 2024-04-17 | End: 2024-04-18 | Stop reason: HOSPADM

## 2024-04-17 RX ORDER — PRAVASTATIN SODIUM 40 MG
40 TABLET ORAL DAILY
Status: DISCONTINUED | OUTPATIENT
Start: 2024-04-17 | End: 2024-04-18 | Stop reason: HOSPADM

## 2024-04-17 RX ORDER — DIPHENHYDRAMINE HCL 25 MG
25 TABLET ORAL EVERY 6 HOURS PRN
Status: DISCONTINUED | OUTPATIENT
Start: 2024-04-17 | End: 2024-04-18 | Stop reason: HOSPADM

## 2024-04-17 RX ORDER — PANTOPRAZOLE SODIUM 40 MG/1
40 TABLET, DELAYED RELEASE ORAL DAILY
Status: DISCONTINUED | OUTPATIENT
Start: 2024-04-18 | End: 2024-04-18 | Stop reason: HOSPADM

## 2024-04-17 RX ORDER — SENNA AND DOCUSATE SODIUM 50; 8.6 MG/1; MG/1
1 TABLET, FILM COATED ORAL DAILY
Status: DISCONTINUED | OUTPATIENT
Start: 2024-04-18 | End: 2024-04-18 | Stop reason: HOSPADM

## 2024-04-17 RX ORDER — SODIUM CHLORIDE 0.9 % (FLUSH) 0.9 %
5-40 SYRINGE (ML) INJECTION EVERY 12 HOURS SCHEDULED
Status: DISCONTINUED | OUTPATIENT
Start: 2024-04-17 | End: 2024-04-18 | Stop reason: HOSPADM

## 2024-04-17 RX ORDER — VITAMIN B COMPLEX
5000 TABLET ORAL DAILY
Status: DISCONTINUED | OUTPATIENT
Start: 2024-04-17 | End: 2024-04-18 | Stop reason: HOSPADM

## 2024-04-17 RX ORDER — ACETAMINOPHEN 325 MG/1
650 TABLET ORAL EVERY 6 HOURS PRN
Status: DISCONTINUED | OUTPATIENT
Start: 2024-04-17 | End: 2024-04-18 | Stop reason: HOSPADM

## 2024-04-17 RX ORDER — POLYETHYLENE GLYCOL 3350 17 G/17G
17 POWDER, FOR SOLUTION ORAL DAILY PRN
Status: DISCONTINUED | OUTPATIENT
Start: 2024-04-17 | End: 2024-04-18 | Stop reason: HOSPADM

## 2024-04-17 RX ORDER — FERROUS SULFATE 325(65) MG
325 TABLET ORAL 2 TIMES DAILY
Status: DISCONTINUED | OUTPATIENT
Start: 2024-04-17 | End: 2024-04-18 | Stop reason: HOSPADM

## 2024-04-17 RX ORDER — MAGNESIUM SULFATE IN WATER 40 MG/ML
2000 INJECTION, SOLUTION INTRAVENOUS PRN
Status: DISCONTINUED | OUTPATIENT
Start: 2024-04-17 | End: 2024-04-18 | Stop reason: HOSPADM

## 2024-04-17 RX ORDER — ONDANSETRON 4 MG/1
4 TABLET, ORALLY DISINTEGRATING ORAL EVERY 8 HOURS PRN
Status: DISCONTINUED | OUTPATIENT
Start: 2024-04-17 | End: 2024-04-18 | Stop reason: HOSPADM

## 2024-04-17 RX ORDER — SODIUM CHLORIDE 0.9 % (FLUSH) 0.9 %
5-40 SYRINGE (ML) INJECTION PRN
Status: DISCONTINUED | OUTPATIENT
Start: 2024-04-17 | End: 2024-04-18 | Stop reason: HOSPADM

## 2024-04-17 RX ORDER — FLUTICASONE PROPIONATE 110 UG/1
2 AEROSOL, METERED RESPIRATORY (INHALATION)
Status: DISCONTINUED | OUTPATIENT
Start: 2024-04-17 | End: 2024-04-18 | Stop reason: HOSPADM

## 2024-04-17 RX ORDER — SODIUM CHLORIDE 9 MG/ML
INJECTION, SOLUTION INTRAVENOUS PRN
Status: DISCONTINUED | OUTPATIENT
Start: 2024-04-17 | End: 2024-04-18 | Stop reason: HOSPADM

## 2024-04-17 RX ORDER — MONTELUKAST SODIUM 10 MG/1
10 TABLET ORAL NIGHTLY
Status: DISCONTINUED | OUTPATIENT
Start: 2024-04-17 | End: 2024-04-18 | Stop reason: HOSPADM

## 2024-04-17 RX ORDER — ONDANSETRON 2 MG/ML
4 INJECTION INTRAMUSCULAR; INTRAVENOUS EVERY 6 HOURS PRN
Status: DISCONTINUED | OUTPATIENT
Start: 2024-04-17 | End: 2024-04-18 | Stop reason: HOSPADM

## 2024-04-17 RX ADMIN — SOTALOL HYDROCHLORIDE 80 MG: 80 TABLET ORAL at 21:08

## 2024-04-17 RX ADMIN — FERROUS SULFATE TAB 325 MG (65 MG ELEMENTAL FE) 325 MG: 325 (65 FE) TAB at 14:34

## 2024-04-17 RX ADMIN — FERROUS SULFATE TAB 325 MG (65 MG ELEMENTAL FE) 325 MG: 325 (65 FE) TAB at 21:08

## 2024-04-17 RX ADMIN — CALCIUM 500 MG: 500 TABLET ORAL at 14:34

## 2024-04-17 RX ADMIN — MONTELUKAST 10 MG: 10 TABLET, FILM COATED ORAL at 21:08

## 2024-04-17 RX ADMIN — SODIUM CHLORIDE, PRESERVATIVE FREE 10 ML: 5 INJECTION INTRAVENOUS at 21:09

## 2024-04-17 RX ADMIN — PRAVASTATIN SODIUM 40 MG: 40 TABLET ORAL at 14:34

## 2024-04-17 RX ADMIN — ENOXAPARIN SODIUM 40 MG: 100 INJECTION SUBCUTANEOUS at 14:34

## 2024-04-17 RX ADMIN — FLUTICASONE PROPIONATE 2 PUFF: 110 AEROSOL, METERED RESPIRATORY (INHALATION) at 18:16

## 2024-04-17 RX ADMIN — Medication 5000 UNITS: at 14:34

## 2024-04-17 RX ADMIN — PREDNISONE 20 MG: 20 TABLET ORAL at 14:34

## 2024-04-17 NOTE — PROGRESS NOTES
[facility], Dr Rose. 89 yo with hx of cavitary lesion of lung, aspergillus per bronch 11/15/22. Pt of Dr Gallagher. Had surgery yesterday for squamous cell CA on leg. Developed hives post procedure, got benadryl and went home. Presented to ED c/o non feeling well, SOB. CXR shows R side infiltrate, possible pneumonia. Her son is a physician, does not want her to have abx , feels she develops resistant infections. Ct head showed sinus issues, nothing acute. Covid flu RSV negative, Wbc 10.1, creat 0.6, EKG nothing acute, troponin wnl, urine wnl. Blood and respiratory cultures ordered. 500 cc bolus, zofran and solu medrol given. No abx per son's request. Vitals 166/75, 82, 95% on room air, T 97.8F.

## 2024-04-17 NOTE — H&P
History & Physical        Patient:  Emmett Hopkins  YOB: 1932    MRN: [id number]     Acct: [id number]    PCP: Andre Garner MD    Date of Admission: 4/17/2024    Date of Service: Pt seen/examined on 04/17/24  and Admitted to Inpatient with expected LOS greater than two midnights due to medical therapy.     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 Gallagher in the past      Chief Complaint: Allergic reaction      History Of Present Illness:    89 y.o. female who presented to [facility] with allergic reaction, patient has a past medical history of hypertension, PAF and a known right bundle branch block, patient had excision of her squamous cell carcinoma to her right lower extremity yesterday, she complained of an allergic reaction that developed hives and feeling unwell; she was given 1 dose of Ancef preop, after the hives started she was given Benadryl when symptoms improved and she was discharged, she returned complaining of shortness of breath, hives to her face visual disturbances, she states all symptoms have resolved at this time, she states she has a chronic cough bringing

## 2024-04-18 ENCOUNTER — APPOINTMENT (OUTPATIENT)
Dept: CT IMAGING | Age: 89
End: 2024-04-18
Attending: STUDENT IN AN ORGANIZED HEALTH CARE EDUCATION/TRAINING PROGRAM
Payer: MEDICARE

## 2024-04-18 ENCOUNTER — APPOINTMENT (OUTPATIENT)
Dept: GENERAL RADIOLOGY | Age: 89
End: 2024-04-18
Attending: STUDENT IN AN ORGANIZED HEALTH CARE EDUCATION/TRAINING PROGRAM
Payer: MEDICARE

## 2024-04-18 ENCOUNTER — APPOINTMENT (OUTPATIENT)
Dept: CT IMAGING | Age: 89
End: 2024-04-18
Attending: RADIOLOGY
Payer: MEDICARE

## 2024-04-18 VITALS
DIASTOLIC BLOOD PRESSURE: 70 MMHG | RESPIRATION RATE: 18 BRPM | HEIGHT: 61 IN | TEMPERATURE: 98.3 F | SYSTOLIC BLOOD PRESSURE: 190 MMHG | WEIGHT: 135 LBS | OXYGEN SATURATION: 97 % | BODY MASS INDEX: 25.49 KG/M2 | HEART RATE: 68 BPM

## 2024-04-18 LAB
ANION GAP SERPL CALC-SCNC: 14 MEQ/L (ref 8–16)
BUN SERPL-MCNC: 28 MG/DL (ref 7–22)
CALCIUM SERPL-MCNC: 8.5 MG/DL (ref 8.5–10.5)
CHLORIDE SERPL-SCNC: 104 MEQ/L (ref 98–111)
CO2 SERPL-SCNC: 22 MEQ/L (ref 23–33)
CREAT SERPL-MCNC: 0.7 MG/DL (ref 0.4–1.2)
GFR SERPL CREATININE-BSD FRML MDRD: 81 ML/MIN/1.73M2
GLUCOSE SERPL-MCNC: 93 MG/DL (ref 70–108)
POTASSIUM SERPL-SCNC: 3.9 MEQ/L (ref 3.5–5.2)
SODIUM SERPL-SCNC: 140 MEQ/L (ref 135–145)

## 2024-04-18 PROCEDURE — 2580000003 HC RX 258: Performed by: NURSE PRACTITIONER

## 2024-04-18 PROCEDURE — 99233 SBSQ HOSP IP/OBS HIGH 50: CPT | Performed by: NURSE PRACTITIONER

## 2024-04-18 PROCEDURE — 99222 1ST HOSP IP/OBS MODERATE 55: CPT | Performed by: INTERNAL MEDICINE

## 2024-04-18 PROCEDURE — 6370000000 HC RX 637 (ALT 250 FOR IP): Performed by: NURSE PRACTITIONER

## 2024-04-18 PROCEDURE — 96372 THER/PROPH/DIAG INJ SC/IM: CPT

## 2024-04-18 PROCEDURE — 6360000002 HC RX W HCPCS: Performed by: NURSE PRACTITIONER

## 2024-04-18 PROCEDURE — 71250 CT THORAX DX C-: CPT

## 2024-04-18 PROCEDURE — G0378 HOSPITAL OBSERVATION PER HR: HCPCS

## 2024-04-18 PROCEDURE — 71045 X-RAY EXAM CHEST 1 VIEW: CPT

## 2024-04-18 PROCEDURE — 99238 HOSP IP/OBS DSCHRG MGMT 30/<: CPT | Performed by: NURSE PRACTITIONER

## 2024-04-18 PROCEDURE — 94640 AIRWAY INHALATION TREATMENT: CPT

## 2024-04-18 PROCEDURE — 94669 MECHANICAL CHEST WALL OSCILL: CPT

## 2024-04-18 PROCEDURE — 36415 COLL VENOUS BLD VENIPUNCTURE: CPT

## 2024-04-18 PROCEDURE — 80048 BASIC METABOLIC PNL TOTAL CA: CPT

## 2024-04-18 RX ADMIN — ENOXAPARIN SODIUM 40 MG: 100 INJECTION SUBCUTANEOUS at 08:11

## 2024-04-18 RX ADMIN — SODIUM CHLORIDE, PRESERVATIVE FREE 10 ML: 5 INJECTION INTRAVENOUS at 08:11

## 2024-04-18 RX ADMIN — SOTALOL HYDROCHLORIDE 80 MG: 80 TABLET ORAL at 08:11

## 2024-04-18 RX ADMIN — PANTOPRAZOLE SODIUM 40 MG: 40 TABLET, DELAYED RELEASE ORAL at 08:11

## 2024-04-18 RX ADMIN — CALCIUM 500 MG: 500 TABLET ORAL at 08:11

## 2024-04-18 RX ADMIN — FERROUS SULFATE TAB 325 MG (65 MG ELEMENTAL FE) 325 MG: 325 (65 FE) TAB at 08:11

## 2024-04-18 RX ADMIN — FLUTICASONE PROPIONATE 2 PUFF: 110 AEROSOL, METERED RESPIRATORY (INHALATION) at 08:00

## 2024-04-18 RX ADMIN — VALSARTAN 320 MG: 320 TABLET ORAL at 08:11

## 2024-04-18 RX ADMIN — Medication 5000 UNITS: at 08:11

## 2024-04-18 RX ADMIN — DOCUSATE SODIUM 50 MG AND SENNOSIDES 8.6 MG 1 TABLET: 8.6; 5 TABLET, FILM COATED ORAL at 08:11

## 2024-04-18 RX ADMIN — PREDNISONE 20 MG: 20 TABLET ORAL at 08:11

## 2024-04-18 RX ADMIN — PRAVASTATIN SODIUM 40 MG: 40 TABLET ORAL at 08:11

## 2024-04-18 ASSESSMENT — ENCOUNTER SYMPTOMS
CHOKING: 0
FACIAL SWELLING: 0
COUGH: 0
SINUS PAIN: 0
GASTROINTESTINAL NEGATIVE: 1
SORE THROAT: 0
RHINORRHEA: 0
WHEEZING: 0
SHORTNESS OF BREATH: 0

## 2024-04-18 NOTE — PROGRESS NOTES
Hospitalist Progress Note    Patient:  Emmett Headapohl      Unit/Bed:8B-24/024-A    YOB: 1932    MRN: [id number]       Acct: [id number]     PCP: Andre Garner MD    Date of Admission: 4/17/2024    Assessment/Plan:    Dyspnea--resolved, likely secondary to #2; on room air,  procalcitonin is normal, incentive spirometry and Acapella  Possible allergic reaction--eosinophil count normal on CBC; possibly secondary to Ancef given preop and developed hives afterwards; had Benadryl with relief postoperatively per ER note, prednisone 20 mg x 1 dose on 4/17; discussed with son via phone and recommends stopping  Squamous cell carcinoma status post excision right lower extremity on 4/16/2024--needs outpatient follow-up  Atelectasis, bibasilar along with possible patchy scattered infiltrates on the right--patient is afebrile, normal white count, checked procalcitonin and normal, states she has a chronic cough and nothing has changed; incentive spirometry and Acapella  PAF--on sotalol  Primary hypertension, uncontrolled--on Diovan; patient's son told the nurse via phone this morning that her blood pressure is high especially with an automatic cuff and anxiety  History of aspergillus--had a bronchoscopy done by Dr. Gallagher in the past, son wanted pulmonology consulted so completed and appreciate input; plan is outpt follow up in 1 week with pulmonology        Expected discharge date: April 18, 2024    Disposition:    [x] Home       [] TCU       [] Rehab       [] Psych       [] SNF       [] Long Term Care Facility       [] Other-    Chief Complaint: Allergic reaction     Hospital Course: 89 y.o. female who presented to [facility] with allergic reaction, patient has a past medical history of hypertension, PAF and a known right bundle branch block, patient had excision of her squamous cell carcinoma to her right lower extremity yesterday, she complained of an allergic reaction that

## 2024-04-18 NOTE — DISCHARGE INSTRUCTIONS
Please continue to exercise your lungs using incentive spirometry and Acapella    Please try and avoid any antibiotics unless absolutely necessary as you have a lot of allergies

## 2024-04-18 NOTE — PLAN OF CARE
Problem: Discharge Planning  Goal: Discharge to home or other facility with appropriate resources  Outcome: Completed  Flowsheets (Taken 4/18/2024 1595)  Discharge to home or other facility with appropriate resources:   Identify barriers to discharge with patient and caregiver   Arrange for needed discharge resources and transportation as appropriate     Problem: ABCDS Injury Assessment  Goal: Absence of physical injury  Outcome: Completed  Note: Absence of injury

## 2024-04-18 NOTE — CONSULTS
[facility] for Pulmonary, Sleep and Critical Care Medicine      Patient - Emmett Hopkins   MRN -  [id number]   [facility] # - [id number]   - 1932      Date of Admission -  2024 10:37 AM  Date of evaluation -  2024  Room - --A   Hospital Day - 0  Consulting - Sherley Cronin APRN* Primary Care Physician - Andre Garner MD     Problem List      Active Hospital Problems    Diagnosis Date Noted    SOB (shortness of breath) [R06.02] 2024     Reason for Consult    Allergic reaction, requested pulmonary consult by patient family  HPI   History Obtained From: Patient and electronic medical record.    Emmett Hopkins is a 89 y.o. female PMH hypertension, PAF known right bundle branch block, skin squamous cell carcinoma right lower extremity, previous Pseudomonas and Aspergillus pneumonia presented to [facility] 2024 secondary to allergic reaction that developed after she had undergone excision of the right lower extremity squamous cell cell carcinoma 2024.  Patient has multiple allergies including Ancef which was given in the preoperative period.  Patient reports that his send she was given the medication started develop hives, and was subsequently given a dose of Benadryl with some symptom improvement resulting in her discharge.  However she returned to the hospital complaining of shortness of breath, hives to her face and neck.  She was subsequently given another dose of Benadryl with resolution of symptoms.    Patient is also complaining of a chronic cough which she has been productive of white phlegm, she denies any shortness of breath this time, fever or chills, lightheadedness or dizziness.    Patient underwent diagnostic flexible bronchoscopy by Dr. Gallagher, 15 November 2022 at [facility].  Patient's bronch cultures grew Pseudomonas aeruginosa.  Patient underwent treatment with antibiotics with IV Merrem along with inhaled tobramycin 300 mg p.o. twice daily for 7

## 2024-04-18 NOTE — PLAN OF CARE
Problem: Discharge Planning  Goal: Discharge to home or other facility with appropriate resources  Outcome: Progressing     Problem: ABCDS Injury Assessment  Goal: Absence of physical injury  Outcome: Progressing   Care plan reviewed with patient.  Patient verbalize understanding of the plan of care and contribute to goal setting.

## 2024-04-28 NOTE — PROGRESS NOTES
[facility].  Pulmonary medicine clinic follow up note.    Patient: Emmett Hopkins  : 1932      Chief complaint/[place]: Emmett Hopkins is a 89 y.o. old female came for follow-up regarding her chronic cough and abnormal CT scan of chest dated 2024. She was recently hospitalized to [facility] in 2024 for evaluation and management of allergic reaction and was discharged on 2024.  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  Patient underwent excision.    She is currently following with the pulmonologist Ms. Sade Lee MD at [facility].  She had a follow-up with her office in 2024.    She had a history of intermittent hemoptysis in the past.      She underwent diagnostic flexible bronchoscopy by me, 15 November 2022 at [facility].  Patient's bronch cultures grew Pseudomonas aeruginosa.  Patient underwent treatment with antibiotics with IV Merrem along with inhaled tobramycin 300 mg p.o. twice daily for 7 days under guidance by Dr Richmond Fernandez MD from ID service. 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  She used to take 200 mg of voriconazole 1 tablet p.o. twice daily. She was advised to have a repeat CT scan of chest with IV contrast before clinic visit.  She lost for follow-up.    Patient followed with the pulmonologist at [facility].  Due to transportation issues she would like to switch her care to my service.    She was initially referred from Dr. Andre Garner MD.    She was brought to my clinic accompanied by her son Dr. Jimmie Cunningham MD    On today's questioning:  She admits to cough with white to clear expectoration.  She denies hemoptysis.  She denies fever or chills.   She denies recent hospitalizations or emergency room visits after her discharge from [facility]

## 2024-05-09 ENCOUNTER — HOSPITAL ENCOUNTER (OUTPATIENT)
Dept: GENERAL RADIOLOGY | Age: 89
Discharge: HOME | End: 2024-05-09

## 2024-05-09 ENCOUNTER — OFFICE VISIT (OUTPATIENT)
Dept: PULMONOLOGY | Age: 89
End: 2024-05-09
Payer: MEDICARE

## 2024-05-09 VITALS
TEMPERATURE: 98.1 F | OXYGEN SATURATION: 98 % | HEART RATE: 56 BPM | WEIGHT: 134 LBS | DIASTOLIC BLOOD PRESSURE: 62 MMHG | HEIGHT: 62 IN | BODY MASS INDEX: 24.66 KG/M2 | SYSTOLIC BLOOD PRESSURE: 122 MMHG

## 2024-05-09 DIAGNOSIS — R91.1 PULMONARY NODULE, RIGHT: ICD-10-CM

## 2024-05-09 DIAGNOSIS — Z00.6 EXAMINATION FOR NORMAL COMPARISON FOR CLINICAL RESEARCH: ICD-10-CM

## 2024-05-09 DIAGNOSIS — R93.89 ABNORMAL CT OF THE CHEST: Primary | ICD-10-CM

## 2024-05-09 DIAGNOSIS — J15.1 PNEUMONIA OF RIGHT LOWER LOBE DUE TO PSEUDOMONAS SPECIES (HCC): ICD-10-CM

## 2024-05-09 PROCEDURE — 1123F ACP DISCUSS/DSCN MKR DOCD: CPT | Performed by: INTERNAL MEDICINE

## 2024-05-09 PROCEDURE — 99214 OFFICE O/P EST MOD 30 MIN: CPT | Performed by: INTERNAL MEDICINE

## 2024-05-09 RX ORDER — AMLODIPINE BESYLATE 2.5 MG/1
2.5 TABLET ORAL DAILY
COMMUNITY

## 2024-05-09 RX ORDER — IPRATROPIUM BROMIDE 21 UG/1
2 SPRAY, METERED NASAL EVERY 12 HOURS
COMMUNITY

## 2024-05-09 NOTE — PROGRESS NOTES
Neck Circumference -  13.5   Mallampati - 4    Lung Nodule Screening     [] Qualifies    [] Does not qualify   [] Declined    [] Completed

## 2024-05-09 NOTE — PATIENT INSTRUCTIONS
Recommendations/Plan:  -I had a detailed discussion with the patient, her son Dr. Francisca MD over phone and her stepdaughter who is in the room regarding further management of her abnormal CT scan of chest and 12 mm right lower lobe lung nodule.  Patient remain asymptomatic and afebrile.  She had a history of chronic cough and chronic sinusitis.  She is currently waiting for a surgery on her sinuses in July 2024 by her ENT specialist.  Due to her history of multiple allergies to drugs including Cipro, nitrofurantoin, cefazolin and clindamycin we all agreed to hold off on any antibiotic therapy at this time.  She had a history of Pseudomonas aeruginosa and Aspergillus infection of the lungs in the past.  If patient develops fever, chills or any constitutional symptoms in near future she is going to call my office to get hospitalized to consider for IV antibiotic therapy with close monitoring for development of allergic reaction.  We also agreed on having a repeat CT scan of chest without IV contrast in 3 months to follow her right upper lobe lung nodule along with pneumonia.  At this time we will hold off on diagnostic /therapeutic flexible fiberoptic bronchoscopy.  -We will schedule patient for CT scan of chest without IV contrast in 3months to follow her multilobar bronchopneumonia and 12 mm subpleural right lower lobe pulmonary nodule is visualized on her CT chest dated 4/17/24.  -She was advised to keep her scheduled appointment with her ENT surgeon in [place], [place] for further management of chronic sinusitis.  She is currently waiting for surgical debridement in July 2024..  -Schedule patient for follow up with my clinic in 3months with recommended test I.e CT chest with out contrast and letter from her ENT surgeon. Patient advised to make early appointment if needed.  -Emmett Hopkins was advised to make early appointment with my clinic if she develops any constitutional symptoms including fever, chill, loss of